# Patient Record
Sex: MALE | Race: WHITE | Employment: FULL TIME | URBAN - METROPOLITAN AREA
[De-identification: names, ages, dates, MRNs, and addresses within clinical notes are randomized per-mention and may not be internally consistent; named-entity substitution may affect disease eponyms.]

---

## 2017-03-17 ENCOUNTER — ALLSCRIPTS OFFICE VISIT (OUTPATIENT)
Dept: OTHER | Facility: OTHER | Age: 57
End: 2017-03-17

## 2017-09-06 ENCOUNTER — ALLSCRIPTS OFFICE VISIT (OUTPATIENT)
Dept: OTHER | Facility: OTHER | Age: 57
End: 2017-09-06

## 2017-10-17 ENCOUNTER — GENERIC CONVERSION - ENCOUNTER (OUTPATIENT)
Dept: OTHER | Facility: OTHER | Age: 57
End: 2017-10-17

## 2017-11-08 ENCOUNTER — ALLSCRIPTS OFFICE VISIT (OUTPATIENT)
Dept: OTHER | Facility: OTHER | Age: 57
End: 2017-11-08

## 2017-11-10 NOTE — PROGRESS NOTES
Assessment    1  Acute sinusitis (461 9) (J01 90)    Plan  Acute sinusitis    · LevoFLOXacin 500 MG Oral Tablet (Levaquin); Take 1 tablet daily   · Guaifenesin-Codeine 100-10 MG/5ML Oral Solution; TAKE 5 ML DAILY ATBEDTIME   · Follow Up if Not Better Evaluation and Treatment  Follow-up  Status: Complete  Done:08Nov2017 02:10PM   · Drink at least 6 glasses of water or juice a day ; Status:Complete;   Done: [id number]:10PM   · Taking a hot steamy shower may help your condition ; Status:Complete;   Done:08Nov2017 02:10PM    Discussion/Summary    To consider probiotic  Possible side effects of new medications were reviewed with the patient/guardian today  The treatment plan was reviewed with the patient/guardian  The patient/guardian understands and agrees with the treatment plan      Chief Complaint  Patient c/o his nose, ears throat and chest pains  Patient is extremely tried  History of Present Illness  Patient was sore throat and ear pain which is ongoing since last visit patient with fatigue  Patient also with some chest pain associated with this  Patient with intermittent cough and some sputum production  Patient with diarrhea but no vomiting  Review of Systems   Constitutional: No fever or chills, feels well, no tiredness, no recent weight gain or weight loss  Eyes: No complaints of eye pain, no red eyes, no discharge from eyes, no itchy eyes  ENT: as noted in HPI  Cardiovascular: No complaints of slow heart rate, no fast heart rate, no chest pain, no palpitations, no leg claudication, no lower extremity  Respiratory: as noted in HPI  Gastrointestinal: No complaints of abdominal pain, no constipation, no nausea or vomiting, no diarrhea or bloody stools  Genitourinary: No complaints of dysuria, no incontinence, no hesitancy, no nocturia, no genital lesion, no testicular pain  Musculoskeletal: No complaints of arthralgia, no myalgias, no joint swelling or stiffness, no limb pain or swelling  Integumentary: No complaints of skin rash or skin lesions, no itching, no skin wound, no dry skin  Neurological: No compliants of headache, no confusion, no convulsions, no numbness or tingling, no dizziness or fainting, no limb weakness, no difficulty walking  Psychiatric: Is not suicidal, no sleep disturbances, no anxiety or depression, no change in personality, no emotional problems  Endocrine: No complaints of proptosis, no hot flashes, no muscle weakness, no erectile dysfunction, no deepening of the voice, no feelings of weakness  Hematologic/Lymphatic: No complaints of swollen glands, no swollen glands in the neck, does not bleed easily, no easy bruising  Active Problems  1  Acute sinusitis (461 9) (J01 90)   2  Bilateral acute serous otitis media, recurrence not specified (381 01) (H65 03)   3  Circulation problem (459 9) (I99 9)   4  Community acquired pneumonia (5) (J18 9)   5  Erectile dysfunction (607 84) (N52 9)   6  HTN (hypertension) (401 9) (I10)   7  Hyperlipidemia (272 4) (E78 5)   8  Other chronic pain (338 29) (G89 29)   9  Sensation of cold in lower extremity (782 9) (R20 9)   10  Special screening examination for neoplasm of prostate (V76 44) (Z12 5)   11  Tobacco abuse (305 1) (Z72 0)   12  Type 2 diabetes mellitus (250 00) (E11 9)   13  URI, acute (465 9) (J06 9)    Past Medical History  1  Acute maxillary sinusitis (461 0) (J01 00)    The active problems and past medical history were reviewed and updated today  Surgical History  1  History of Colonoscopy (Fiberoptic)    Family History  Father    1  Family history of Liver Cancer   2  Family history of Malignant Melanoma Of The Skin (V16 8)    Social History     · Alcohol Use (History)   · Former smoker (N70 96) (S09 815)    Current Meds   1  Aspirin 325 MG Oral Tablet; Therapy: 96ZSB8206 to Recorded   2  Fluticasone Propionate 50 MCG/ACT Nasal Suspension; USE 1 SPRAY IN EACH NOSTRIL TWICE DAILY;  Therapy: 51AGF8149 to (Last Rx: 01EAL1854)  Requested for: 02EDF0241 Ordered   3  Glimepiride 2 MG Oral Tablet; TAKE 1 TABLET DAILY AS DIRECTED; Therapy: 59ELR7441 to (Evaluate:37Iqt0211)  Requested for: 52LFR5775; Last Rx:17Mar2017 Ordered   4  Guaifenesin-Codeine 100-10 MG/5ML Oral Solution; TAKE 5 ML DAILY AT BEDTIME; Therapy: 38DPK1279 to (Evaluate:71Imi9375); Last Rx:17Oct2017 Ordered   5  Losartan Potassium 25 MG Oral Tablet; TAKE 1 TABLET DAILY; Therapy: 96AHO8956 to (Evaluate:24Hlk2676)  Requested for: 49UMP5767; Last Rx:17Mar2017 Ordered   6  MetFORMIN HCl - 1000 MG Oral Tablet; TAKE 1 TABLET TWICE DAILY; Therapy: 00Zfq1790 to (Evaluate:27Nov2017)  Requested for: 38Vuu5691; Last Rx:20Urq3856 Ordered   7  Niacin ER (Antihyperlipidemic) 1000 MG Oral Tablet Extended Release; TAKE 1 TABLET AT BEDTIME; Therapy: 25ZKW6982 to (Evaluate:73Ayj8650)  Requested for: 55SZU5749; Last Rx:17Mar2017 Ordered   8  ProAir  (90 Base) MCG/ACT Inhalation Aerosol Solution; INHALE 2 PUFFS EVERY 4 HOURS AS NEEDED; Therapy: 37SXE1464 to (Evaluate:15Jun2017)  Requested for: 68ZAD0405; Last Rx:17Mar2017 Ordered   9  Rosuvastatin Calcium 10 MG Oral Tablet; TAKE 1 TABLET AT BEDTIME; Therapy: 07NPU9894 to (Evaluate:46Syv2677)  Requested for: 79AYY9081; Last Rx:17Mar2017 Ordered   10  Zetia 10 MG Oral Tablet; Take 1 tablet daily; Therapy: 25NLB7573 to (Evaluate:16May2017)  Requested for: 05ZFI3253; Last  Rx:17Mar2017 Ordered    The medication list was reviewed and updated today  Allergies  1  Penicillins  2  Shellfish    Vitals  Vital Signs    Recorded: 67ECZ1809 01:04PM   Temperature 96 9 F, Tympanic   Systolic 660, LLE, Sitting   Diastolic 78, LLE, Sitting   Height 6 ft    Weight 228 lb 4 oz   BMI Calculated 30 96   BSA Calculated 2 25       Physical Exam   Constitutional  General appearance: No acute distress, well appearing and well nourished  Eyes  Conjunctiva and lids: No swelling, erythema, or discharge     Pupils and irises: Equal, round and reactive to light  Ears, Nose, Mouth, and Throat  External inspection of ears and nose: Normal    Otoscopic examination: Tympanic membrance translucent with normal light reflex  Canals patent without erythema  Nasal mucosa, septum, and turbinates: Normal without edema or erythema  Oropharynx: Abnormal  -- red, postnasal drip  Pulmonary  Respiratory effort: No increased work of breathing or signs of respiratory distress  Auscultation of lungs: Clear to auscultation, equal breath sounds bilaterally, no wheezes, no rales, no rhonci  Cardiovascular  Palpation of heart: Normal PMI, no thrills  Auscultation of heart: Normal rate and rhythm, normal S1 and S2, without murmurs  Examination of extremities for edema and/or varicosities: Normal    Carotid pulses: Normal    Abdomen  Abdomen: Non-tender, no masses  Liver and spleen: No hepatomegaly or splenomegaly  Lymphatic  Palpation of lymph nodes in neck: No lymphadenopathy  Musculoskeletal  Gait and station: Normal    Digits and nails: Normal without clubbing or cyanosis  Inspection/palpation of joints, bones, and muscles: Normal    Skin  Skin and subcutaneous tissue: Normal without rashes or lesions  Neurologic  Cranial nerves: Cranial nerves 2-12 intact  Reflexes: 2+ and symmetric  Sensation: No sensory loss  Psychiatric  Orientation to person, place and time: Normal    Mood and affect: Normal          Results/Data  PHQ-9 Adult Depression Screening [id number] 01:04PM User, [facility]     Test Name Result Flag Reference   PHQ-9 Adult Depression Score 14       Over the last two weeks, how often have you been bothered by any of the following problems?  Little interest or pleasure in doing things: More than half the days - 2 Feeling down, depressed, or hopeless: More than half the days - 2 Trouble falling or staying asleep, or sleeping too much: Nearly every day - 3 Feeling tired or having little energy: Nearly every day - 3 Poor appetite or over eating: Not at all - 0 Feeling bad about yourself - or that you are a failure or have let yourself or your family down: More than half the days - 2 Trouble concentrating on things, such as reading the newspaper or watching television: Not at all - 0 Moving or speaking so slowly that other people could have noticed   Or the opposite -  being so fidgety or restless that you have been moving around a lot more than usual: More than half the days - 2 Thoughts that you would be better off dead, or of hurting yourself in some way: Not at all - 0   PHQ-9 Adult Depression Screening Positive     PHQ-9 Difficulty Level Somewhat difficult     PHQ-9 Severity Moderate Depression           Signatures   Electronically signed by : Yaw Haque DO; Nov 8 2017  2:11PM EST                       (Author)

## 2018-01-12 VITALS
BODY MASS INDEX: 30.91 KG/M2 | SYSTOLIC BLOOD PRESSURE: 120 MMHG | WEIGHT: 228.25 LBS | HEIGHT: 72 IN | DIASTOLIC BLOOD PRESSURE: 78 MMHG | TEMPERATURE: 96.9 F

## 2018-01-12 VITALS
HEIGHT: 73 IN | SYSTOLIC BLOOD PRESSURE: 124 MMHG | BODY MASS INDEX: 26.17 KG/M2 | DIASTOLIC BLOOD PRESSURE: 68 MMHG | WEIGHT: 197.5 LBS

## 2018-01-12 VITALS
WEIGHT: 217 LBS | DIASTOLIC BLOOD PRESSURE: 68 MMHG | HEIGHT: 72 IN | SYSTOLIC BLOOD PRESSURE: 124 MMHG | BODY MASS INDEX: 29.39 KG/M2

## 2018-01-15 NOTE — PROGRESS NOTES
Assessment    1  Erectile dysfunction (607 84) (N52 9)    Plan  Community acquired pneumonia    · ProAir  (90 Base) MCG/ACT Inhalation Aerosol Solution; INHALE 2  PUFFS EVERY 4 HOURS AS NEEDED  Erectile dysfunction    · Cialis 20 MG Oral Tablet; take 1 tablet by mouth once daily as directed   · Emily Dominguez MD, [facility]  (Ophthalmology) Physician Referral  Consult  Status: Hold For -  Scheduling  Requested for: 16QYK1919  Care Summary provided  : Yes  Hyperlipidemia    · Crestor 10 MG Oral Tablet; TAKE 1 TABLET AT BEDTIME   · Niacin ER (Antihyperlipidemic) 1000 MG Oral Tablet Extended Release  (Niaspan); TAKE 1 TABLET AT BEDTIME   · Zetia 10 MG Oral Tablet; Take 1 tablet daily  Type 2 diabetes mellitus    · Glimepiride 2 MG Oral Tablet (Amaryl); Take 1 tablet twice daily   · Losartan Potassium 25 MG Oral Tablet; TAKE 1 TABLET DAILY   · MetFORMIN HCl - 1000 MG Oral Tablet; Take 1 tablet twice a day    Discussion/Summary    Rto in 3 months  Chief Complaint  Yearly physical, needs refills of all meds  History of Present Illness  HPI: Here foe dm,hbp,high lipiids  Active Problems    1  Community acquired pneumonia (5) (J18 9)   2  Hyperlipidemia (272 4) (E78 5)   3  Other chronic pain (338 29) (G89 29)   4  Special screening examination for neoplasm of prostate (V76 44) (Z12 5)   5  Type 2 diabetes mellitus (250 00) (E11 9)   6  URI, acute (465 9) (J06 9)    Surgical History    · History of Colonoscopy (Fiberoptic)    Family History    · Family history of Liver Cancer   · Family history of Malignant Melanoma Of The Skin (V16 8)    Social History    · Alcohol Use (History)   · Former smoker (V15 82) (G70 306)    Current Meds   1  Aspirin 325 MG Oral Tablet; Therapy: 22DXS1105 to Recorded   2  Crestor 10 MG Oral Tablet; TAKE 1 TABLET AT BEDTIME; Therapy: 24Apr2012 to (Evaluate:12Jan2016)  Requested for: 61AOV7606; Last   Rx:13Nov2015 Ordered   3  Glimepiride 2 MG Oral Tablet;  Take 1 tablet twice daily; Therapy: 91MXY3986 to (Last Rx:10Jun2015)  Requested for: 10Jun2015 Ordered   4  Losartan Potassium 25 MG Oral Tablet; TAKE 1 TABLET DAILY; Therapy: 38FCN3804 to (Darius Garcia)  Requested for: 01Sep2015; Last   Rx:01Sep2015 Ordered   5  MetFORMIN HCl - 1000 MG Oral Tablet; Take 1 tablet twice a day; Therapy: 69Njh8447 to (Evaluate:78Scq9089)  Requested for: 22JDI8644; Last   Rx:10Jun2015; Status: ACTIVE - Renewal Denied Ordered   6  Niacin ER (Antihyperlipidemic) 1000 MG Oral Tablet Extended Release; TAKE 1 TABLET   AT BEDTIME; Therapy: 45Znj0271 to (Evaluate:76Mel3485)  Requested for: 83HMM9001; Last   Rx:10Jun2015 Ordered   7  ProAir  (90 Base) MCG/ACT Inhalation Aerosol Solution; INHALE 2 PUFFS   EVERY 4 HOURS AS NEEDED; Therapy: 79IBS8818 to (Evaluate:05Hzv0252)  Requested for: 98QVT0849; Last   Rx:13Nov2015 Ordered   8  Zetia 10 MG Oral Tablet; Take 1 tablet daily; Therapy: 86UZE8583 to (Evaluate:32Cwg8042)  Requested for: 45MJM2352; Last   Rx:27Oct2015 Ordered    Allergies    1  Penicillins    2  Shellfish    Vitals   Recorded: 14UUH8453 94:24DF   Systolic 844, RUE, Sitting   Diastolic 82, RUE, Sitting   Height 5 ft 10 in   Weight 231 lb 6 oz   BMI Calculated 33 2   BSA Calculated 2 22     Physical Exam    Constitutional   General appearance: No acute distress, well appearing and well nourished  Eyes   Conjunctiva and lids: No erythema, swelling or discharge  Pupils and irises: Equal, round, reactive to light  Ophthalmoscopic examination: Normal fundi and optic discs  Ears, Nose, Mouth, and Throat   External inspection of ears and nose: Normal     Otoscopic examination: Tympanic membranes translucent with normal light reflex  Canals patent without erythema  Hearing: Normal     Nasal mucosa, septum, and turbinates: Normal without edema or erythema  Lips, teeth, and gums: Normal, good dentition  Oropharynx: Normal with no erythema, edema, exudate or lesions  Neck   Neck: Supple, symmetric, trachea midline, no masses  Thyroid: Normal, no thyromegaly  Pulmonary   Respiratory effort: No increased work of breathing or signs of respiratory distress  Percussion of chest: Normal     Palpation of chest: Normal     Auscultation of lungs: Clear to auscultation  Cardiovascular   Palpation of heart: Normal PMI, no thrills  Auscultation of heart: Normal rate and rhythm, normal S1 and S2, no murmurs  Carotid pulses: 2+ bilaterally  Abdominal aorta: Normal     Femoral pulses: 2+ bilaterally  Pedal pulses: 2+ bilaterally  Examination of extremities for edema and/or varicosities: Normal     Chest   Breasts: Normal, no dimpling or skin changes appreciated  Palpation of breasts and axillae: Normal, no masses palpated  Chest: Normal     Abdomen   Abdomen: Non-tender, no masses  Liver and spleen: No hepatomegaly or splenomegaly  Examination for hernias: No hernias appreciated  Anus, perineum, and rectum: Normal sphincter tone, no masses, no prolapse  Stool sample for occult blood: Negative  Genitourinary   Scrotal contents: Normal testes, no masses  Penis: Normal, no lesions  Digital rectal exam of prostate: Normal size, no masses  Lymphatic   Palpation of lymph nodes in neck: No lymphadenopathy  Palpation of lymph nodes in axillae: No lymphadenopathy  Palpation of lymph nodes in groin: No lymphadenopathy  Palpation of lymph nodes in other areas: No lymphadenopathy  Musculoskeletal   Gait and station: Normal     Inspection/palpation of digits and nails: Normal without clubbing or cyanosis  Inspection/palpation of joints, bones, and muscles: Normal     Range of motion: Normal     Stability: Normal     Muscle strength/tone: Normal     Skin   Skin and subcutaneous tissue: Normal without rashes or lesions  Palpation of skin and subcutaneous tissue: Normal turgor      Neurologic   Cranial nerves: Cranial nerves 2-12 intact  Reflexes: 2+ and symmetric  Sensation: No sensory loss  Psychiatric   Judgment and insight: Normal     Orientation to person, place and time: Normal     Recent and remote memory: Intact      Mood and affect: Normal        Signatures   Electronically signed by : Marian Ghosh DO; Mar  3 2016  1:35PM EST                       (Author)

## 2018-01-22 VITALS
HEIGHT: 72 IN | DIASTOLIC BLOOD PRESSURE: 60 MMHG | SYSTOLIC BLOOD PRESSURE: 122 MMHG | TEMPERATURE: 97.7 F | BODY MASS INDEX: 30.39 KG/M2 | WEIGHT: 224.38 LBS

## 2018-02-07 DIAGNOSIS — N52.9 ERECTILE DYSFUNCTION, UNSPECIFIED ERECTILE DYSFUNCTION TYPE: Primary | ICD-10-CM

## 2018-02-08 RX ORDER — SILDENAFIL 100 MG/1
100 TABLET, FILM COATED ORAL DAILY PRN
Qty: 10 TABLET | Refills: 2 | OUTPATIENT
Start: 2018-02-08 | End: 2018-04-25

## 2018-02-23 ENCOUNTER — TELEPHONE (OUTPATIENT)
Dept: FAMILY MEDICINE CLINIC | Facility: CLINIC | Age: 58
End: 2018-02-23

## 2018-04-03 RX ORDER — LOSARTAN POTASSIUM 25 MG/1
1 TABLET ORAL DAILY
COMMUNITY
Start: 2014-06-27 | End: 2018-10-15 | Stop reason: SDUPTHER

## 2018-04-03 RX ORDER — FLUTICASONE PROPIONATE 50 MCG
1 SPRAY, SUSPENSION (ML) NASAL 2 TIMES DAILY
COMMUNITY
Start: 2017-10-17 | End: 2018-11-30

## 2018-04-03 RX ORDER — NIACIN 1000 MG/1
1 TABLET, EXTENDED RELEASE ORAL
COMMUNITY
Start: 2012-04-24 | End: 2018-04-04 | Stop reason: SDUPTHER

## 2018-04-03 RX ORDER — ALBUTEROL SULFATE 90 UG/1
2 AEROSOL, METERED RESPIRATORY (INHALATION) EVERY 4 HOURS PRN
COMMUNITY
Start: 2015-11-13 | End: 2018-10-15 | Stop reason: SDUPTHER

## 2018-04-03 RX ORDER — GLIMEPIRIDE 2 MG/1
1 TABLET ORAL DAILY
COMMUNITY
Start: 2013-01-09 | End: 2018-10-15 | Stop reason: SDUPTHER

## 2018-04-03 RX ORDER — EZETIMIBE 10 MG/1
1 TABLET ORAL DAILY
COMMUNITY
Start: 2013-09-05 | End: 2018-04-04 | Stop reason: SDUPTHER

## 2018-04-03 RX ORDER — ROSUVASTATIN CALCIUM 10 MG/1
1 TABLET, COATED ORAL
COMMUNITY
Start: 2012-04-24 | End: 2018-04-04 | Stop reason: SDUPTHER

## 2018-04-03 RX ORDER — ASPIRIN 325 MG
TABLET ORAL
COMMUNITY
Start: 2013-01-02 | End: 2019-05-14 | Stop reason: SDUPTHER

## 2018-04-04 ENCOUNTER — OFFICE VISIT (OUTPATIENT)
Dept: FAMILY MEDICINE CLINIC | Facility: CLINIC | Age: 58
End: 2018-04-04
Payer: COMMERCIAL

## 2018-04-04 VITALS
OXYGEN SATURATION: 98 % | BODY MASS INDEX: 30.88 KG/M2 | WEIGHT: 228 LBS | DIASTOLIC BLOOD PRESSURE: 68 MMHG | HEART RATE: 97 BPM | SYSTOLIC BLOOD PRESSURE: 118 MMHG | TEMPERATURE: 97.7 F | HEIGHT: 72 IN

## 2018-04-04 DIAGNOSIS — J40 BRONCHITIS: Primary | ICD-10-CM

## 2018-04-04 DIAGNOSIS — E11.9 TYPE 2 DIABETES MELLITUS WITHOUT COMPLICATION, UNSPECIFIED LONG TERM INSULIN USE STATUS: Primary | ICD-10-CM

## 2018-04-04 DIAGNOSIS — J40 BRONCHITIS: ICD-10-CM

## 2018-04-04 PROBLEM — I10 HTN (HYPERTENSION): Status: ACTIVE | Noted: 2017-03-17

## 2018-04-04 PROCEDURE — 99213 OFFICE O/P EST LOW 20 MIN: CPT | Performed by: FAMILY MEDICINE

## 2018-04-04 RX ORDER — GUAIFENESIN AND CODEINE PHOSPHATE 100; 10 MG/5ML; MG/5ML
5 SOLUTION ORAL 3 TIMES DAILY PRN
Qty: 120 ML | Refills: 0 | Status: SHIPPED | OUTPATIENT
Start: 2018-04-04 | End: 2018-11-30

## 2018-04-04 RX ORDER — NIACIN 1000 MG/1
1000 TABLET, EXTENDED RELEASE ORAL
Qty: 90 TABLET | Refills: 0 | Status: SHIPPED | OUTPATIENT
Start: 2018-04-04 | End: 2018-06-30 | Stop reason: SDUPTHER

## 2018-04-04 RX ORDER — ROSUVASTATIN CALCIUM 10 MG/1
10 TABLET, COATED ORAL DAILY
Qty: 90 TABLET | Refills: 0 | Status: SHIPPED | OUTPATIENT
Start: 2018-04-04 | End: 2018-06-30 | Stop reason: SDUPTHER

## 2018-04-04 RX ORDER — MOXIFLOXACIN HYDROCHLORIDE 400 MG/1
400 TABLET ORAL DAILY
Qty: 7 TABLET | Refills: 0 | Status: SHIPPED | OUTPATIENT
Start: 2018-04-04 | End: 2018-04-11

## 2018-04-04 RX ORDER — EZETIMIBE 10 MG/1
10 TABLET ORAL DAILY
Qty: 90 TABLET | Refills: 0 | Status: SHIPPED | OUTPATIENT
Start: 2018-04-04 | End: 2018-06-30 | Stop reason: SDUPTHER

## 2018-04-04 RX ORDER — MOXIFLOXACIN HYDROCHLORIDE 400 MG/1
400 TABLET ORAL DAILY
Qty: 7 TABLET | Refills: 0 | Status: SHIPPED | OUTPATIENT
Start: 2018-04-04 | End: 2018-04-04 | Stop reason: SDUPTHER

## 2018-04-04 NOTE — TELEPHONE ENCOUNTER
Patient is requesting medication refills of Zetia 10 mg, Niaspan 1000 mg Cr, Crestor 10 mg today at his visit

## 2018-04-04 NOTE — PROGRESS NOTES
Assessment/Plan:   patient will continue with albuterol 2 puffs 3 times a day  No problem-specific Assessment & Plan notes found for this encounter  Diagnoses and all orders for this visit:    Bronchitis  -     moxifloxacin (AVELOX) 400 MG tablet; Take 1 tablet (400 mg total) by mouth daily for 7 days    Other orders  -     aspirin 325 mg tablet; Take by mouth  -     fluticasone (FLONASE) 50 mcg/act nasal spray; 1 spray into each nostril 2 (two) times a day  -     glimepiride (AMARYL) 2 mg tablet; Take 1 tablet by mouth daily  -     losartan (COZAAR) 25 mg tablet; Take 1 tablet by mouth daily  -     metFORMIN (GLUCOPHAGE) 1000 MG tablet; Take 1 tablet by mouth 2 (two) times a day  -     niacin (NIASPAN) 1000 MG CR tablet; Take 1 tablet by mouth  -     albuterol (PROAIR HFA) 90 mcg/act inhaler; Inhale 2 puffs every 4 (four) hours as needed  -     rosuvastatin (CRESTOR) 10 MG tablet; Take 1 tablet by mouth  -     ezetimibe (ZETIA) 10 mg tablet; Take 1 tablet by mouth daily          Subjective:      Patient ID: Joya Mchugh is a 60 y o  male  Patient is here with sore throat, ear ache, fatigue over the past 6 days  Patient with cough  Occasional postnasal drip / rhinorrhea  Bilateral ear pain noted  Patient has noticed some your discharge also  Possible fever at the beginning of the illness  No vomiting  No diarrhea  Patient using aspirin  The following portions of the patient's history were reviewed and updated as appropriate: allergies, current medications, past family history, past medical history, past social history, past surgical history and problem list     Review of Systems   Constitutional: Positive for fever  Negative for fatigue  HENT: Positive for ear pain, rhinorrhea and sore throat  Eyes: Negative  Respiratory: Positive for cough  Cardiovascular: Negative  Gastrointestinal: Negative  Negative for diarrhea and nausea  Endocrine: Negative      Genitourinary: Negative  Musculoskeletal: Negative  Skin: Negative  Allergic/Immunologic: Negative  Neurological: Negative  Hematological: Negative  Psychiatric/Behavioral: Negative  Objective:      /68 (BP Location: Right arm, Patient Position: Sitting, Cuff Size: Large)   Pulse 97   Temp 97 7 °F (36 5 °C) (Tympanic)   Ht 6' (1 829 m)   Wt 103 kg (228 lb)   SpO2 98%   BMI 30 92 kg/m²          Physical Exam   Constitutional: He is oriented to person, place, and time  He appears well-developed and well-nourished  No distress  HENT:   Head: Normocephalic  Right Ear: External ear normal    Left Ear: External ear normal    Mouth/Throat: Oropharyngeal exudate present  Eyes: EOM are normal  Pupils are equal, round, and reactive to light  Right eye exhibits no discharge  Left eye exhibits no discharge  No scleral icterus  Neck: Normal range of motion  Neck supple  No thyromegaly present  Cardiovascular: Normal rate, regular rhythm, normal heart sounds and intact distal pulses  Exam reveals no gallop and no friction rub  No murmur heard  Pulmonary/Chest: Effort normal and breath sounds normal  No respiratory distress  He has no wheezes  He has no rales  He exhibits no tenderness  Rhonchi right base   Abdominal: Soft  Bowel sounds are normal  He exhibits no distension  There is no tenderness  There is no rebound and no guarding  Musculoskeletal: Normal range of motion  He exhibits no edema or tenderness  Lymphadenopathy:     He has no cervical adenopathy  Neurological: He is oriented to person, place, and time  No cranial nerve deficit  He exhibits normal muscle tone  Coordination normal    Skin: Skin is warm and dry  No rash noted  He is not diaphoretic  No erythema  No pallor  Psychiatric: He has a normal mood and affect  His behavior is normal  Judgment and thought content normal    Nursing note and vitals reviewed

## 2018-04-04 NOTE — TELEPHONE ENCOUNTER
Dr Fabiana Rodriugez,    Pharmacy also said the the patient is requesting a refill for moxifloxacin 400 mg

## 2018-04-25 ENCOUNTER — OFFICE VISIT (OUTPATIENT)
Dept: FAMILY MEDICINE CLINIC | Facility: CLINIC | Age: 58
End: 2018-04-25

## 2018-04-25 VITALS
WEIGHT: 222.6 LBS | HEIGHT: 73 IN | DIASTOLIC BLOOD PRESSURE: 70 MMHG | SYSTOLIC BLOOD PRESSURE: 120 MMHG | BODY MASS INDEX: 29.5 KG/M2 | TEMPERATURE: 98 F

## 2018-04-25 DIAGNOSIS — J40 BRONCHITIS: Primary | ICD-10-CM

## 2018-04-25 DIAGNOSIS — Z72.0 TOBACCO ABUSE: ICD-10-CM

## 2018-04-25 DIAGNOSIS — J01.10 ACUTE NON-RECURRENT FRONTAL SINUSITIS: ICD-10-CM

## 2018-04-25 PROCEDURE — 99213 OFFICE O/P EST LOW 20 MIN: CPT | Performed by: FAMILY MEDICINE

## 2018-04-25 RX ORDER — CEFDINIR 300 MG/1
300 CAPSULE ORAL EVERY 12 HOURS SCHEDULED
Qty: 20 CAPSULE | Refills: 0 | Status: SHIPPED | OUTPATIENT
Start: 2018-04-25 | End: 2018-05-05

## 2018-04-25 RX ORDER — METHYLPREDNISOLONE 4 MG/1
TABLET ORAL
Qty: 21 TABLET | Refills: 0 | Status: SHIPPED | OUTPATIENT
Start: 2018-04-25 | End: 2018-11-30

## 2018-04-25 NOTE — PROGRESS NOTES
Assessment/Plan:   patient has used amoxicillin without difficulty  Patient will use Omnicef twice daily with food for the next 10 days  Patient will do Medrol Dosepak  Patient will go for CT scan of his chest        Diagnoses and all orders for this visit:    Bronchitis  -     XR chest pa & lateral; Future  -     CT lung screening program; Future  -     Methylprednisolone 4 MG TBPK; Use as directed on package    Acute non-recurrent frontal sinusitis  -     cefdinir (OMNICEF) 300 mg capsule; Take 1 capsule (300 mg total) by mouth every 12 (twelve) hours for 10 days    Tobacco abuse          Subjective:      Patient ID: Madhavi Maldonado is a 60 y o  male  Patient is here with sore throat earache cough and headache which is been persisting  Earache    Associated symptoms include coughing, headaches, rhinorrhea and a sore throat  Sore Throat    Associated symptoms include congestion, coughing, ear pain and headaches  Cough   Associated symptoms include ear pain, headaches, rhinorrhea and a sore throat  Headache    Associated symptoms include coughing, ear pain, rhinorrhea and a sore throat  The following portions of the patient's history were reviewed and updated as appropriate: allergies, current medications, past family history, past medical history, past social history, past surgical history and problem list     Review of Systems   Constitutional: Negative  HENT: Positive for congestion, ear pain, rhinorrhea and sore throat  Eyes: Negative  Respiratory: Positive for cough  Cardiovascular: Negative  Gastrointestinal: Negative  Endocrine: Negative  Genitourinary: Negative  Musculoskeletal: Negative  Skin: Negative  Allergic/Immunologic: Negative  Neurological: Positive for headaches  Hematological: Negative  Psychiatric/Behavioral: Negative            Objective:      /70 (BP Location: Left arm, Patient Position: Sitting, Cuff Size: Standard)   Temp 98 °F (36 7 °C)   Ht 6' 1" (1 854 m)   Wt 101 kg (222 lb 9 6 oz)   BMI 29 37 kg/m²          Physical Exam   Constitutional: He is oriented to person, place, and time  He appears well-developed and well-nourished  No distress  HENT:   Head: Normocephalic  Right Ear: External ear normal    Left Ear: External ear normal    Mouth/Throat: Oropharyngeal exudate present  Eyes: EOM are normal  Pupils are equal, round, and reactive to light  Right eye exhibits no discharge  Left eye exhibits no discharge  No scleral icterus  Neck: Normal range of motion  Neck supple  No thyromegaly present  Cardiovascular: Normal rate, regular rhythm, normal heart sounds and intact distal pulses  Exam reveals no gallop and no friction rub  No murmur heard  Pulmonary/Chest: Effort normal and breath sounds normal  No respiratory distress  He has no wheezes  He has no rales  He exhibits no tenderness  Abdominal: Soft  Bowel sounds are normal  He exhibits no distension  There is no tenderness  There is no rebound and no guarding  Musculoskeletal: Normal range of motion  He exhibits no edema or tenderness  Lymphadenopathy:     He has no cervical adenopathy  Neurological: He is oriented to person, place, and time  No cranial nerve deficit  He exhibits normal muscle tone  Coordination normal    Skin: Skin is warm and dry  No rash noted  He is not diaphoretic  No erythema  No pallor  Psychiatric: He has a normal mood and affect  His behavior is normal  Judgment and thought content normal    Nursing note and vitals reviewed

## 2018-05-22 ENCOUNTER — HOSPITAL ENCOUNTER (OUTPATIENT)
Dept: CT IMAGING | Facility: HOSPITAL | Age: 58
Discharge: HOME | End: 2018-05-22
Payer: COMMERCIAL

## 2018-05-22 DIAGNOSIS — J40 BRONCHITIS: ICD-10-CM

## 2018-05-24 ENCOUNTER — TELEPHONE (OUTPATIENT)
Dept: FAMILY MEDICINE CLINIC | Facility: CLINIC | Age: 58
End: 2018-05-24

## 2018-05-24 NOTE — TELEPHONE ENCOUNTER
PT LOOKING FOR RESULTS OF HIS CT SCAN FOR LUNG CANCER SCREENING  DOES NOT LOOK LIKE WE HAVE A FINAL READING   PLEASE CALL WHEN READY

## 2018-06-30 DIAGNOSIS — E11.9 TYPE 2 DIABETES MELLITUS WITHOUT COMPLICATION (HCC): ICD-10-CM

## 2018-07-02 RX ORDER — NIACIN 1000 MG/1
1000 TABLET, EXTENDED RELEASE ORAL
Qty: 90 TABLET | Refills: 0 | Status: SHIPPED | OUTPATIENT
Start: 2018-07-02 | End: 2018-10-15 | Stop reason: SDUPTHER

## 2018-07-02 RX ORDER — ROSUVASTATIN CALCIUM 10 MG/1
10 TABLET, COATED ORAL DAILY
Qty: 90 TABLET | Refills: 0 | Status: SHIPPED | OUTPATIENT
Start: 2018-07-02 | End: 2018-10-15 | Stop reason: SDUPTHER

## 2018-07-02 RX ORDER — EZETIMIBE 10 MG/1
10 TABLET ORAL DAILY
Qty: 90 TABLET | Refills: 0 | Status: SHIPPED | OUTPATIENT
Start: 2018-07-02 | End: 2018-10-15 | Stop reason: SDUPTHER

## 2018-10-15 ENCOUNTER — OFFICE VISIT (OUTPATIENT)
Dept: FAMILY MEDICINE CLINIC | Facility: CLINIC | Age: 58
End: 2018-10-15

## 2018-10-15 VITALS
TEMPERATURE: 98.1 F | DIASTOLIC BLOOD PRESSURE: 92 MMHG | BODY MASS INDEX: 30.62 KG/M2 | WEIGHT: 231 LBS | HEIGHT: 73 IN | SYSTOLIC BLOOD PRESSURE: 142 MMHG

## 2018-10-15 DIAGNOSIS — E78.2 MIXED HYPERLIPIDEMIA: ICD-10-CM

## 2018-10-15 DIAGNOSIS — J40 BRONCHITIS: Primary | ICD-10-CM

## 2018-10-15 DIAGNOSIS — E11.9 TYPE 2 DIABETES MELLITUS WITHOUT COMPLICATION, UNSPECIFIED WHETHER LONG TERM INSULIN USE (HCC): ICD-10-CM

## 2018-10-15 PROCEDURE — 99212 OFFICE O/P EST SF 10 MIN: CPT | Performed by: FAMILY MEDICINE

## 2018-10-15 PROCEDURE — 4010F ACE/ARB THERAPY RXD/TAKEN: CPT | Performed by: FAMILY MEDICINE

## 2018-10-15 RX ORDER — PREDNISONE 20 MG/1
40 TABLET ORAL DAILY
Qty: 10 TABLET | Refills: 0 | Status: SHIPPED | OUTPATIENT
Start: 2018-10-15 | End: 2018-11-30

## 2018-10-15 RX ORDER — CEFDINIR 300 MG/1
300 CAPSULE ORAL EVERY 12 HOURS SCHEDULED
Qty: 20 CAPSULE | Refills: 0 | Status: SHIPPED | OUTPATIENT
Start: 2018-10-15 | End: 2018-10-25

## 2018-10-15 RX ORDER — GUAIFENESIN AND CODEINE PHOSPHATE 100; 10 MG/5ML; MG/5ML
5 SOLUTION ORAL 3 TIMES DAILY PRN
Qty: 120 ML | Refills: 0 | Status: SHIPPED | OUTPATIENT
Start: 2018-10-15 | End: 2018-11-30

## 2018-10-15 RX ORDER — NIACIN 1000 MG/1
1000 TABLET, EXTENDED RELEASE ORAL
Qty: 90 TABLET | Refills: 0 | Status: SHIPPED | OUTPATIENT
Start: 2018-10-15 | End: 2019-01-29 | Stop reason: SDUPTHER

## 2018-10-15 RX ORDER — EZETIMIBE 10 MG/1
10 TABLET ORAL DAILY
Qty: 90 TABLET | Refills: 1 | Status: SHIPPED | OUTPATIENT
Start: 2018-10-15 | End: 2019-05-14 | Stop reason: SDUPTHER

## 2018-10-15 RX ORDER — LOSARTAN POTASSIUM 25 MG/1
25 TABLET ORAL DAILY
Qty: 90 TABLET | Refills: 1 | Status: SHIPPED | OUTPATIENT
Start: 2018-10-15 | End: 2019-05-14 | Stop reason: SDUPTHER

## 2018-10-15 RX ORDER — ALBUTEROL SULFATE 90 UG/1
2 AEROSOL, METERED RESPIRATORY (INHALATION) EVERY 4 HOURS PRN
Qty: 1 EACH | Refills: 0 | Status: SHIPPED | OUTPATIENT
Start: 2018-10-15 | End: 2019-05-14 | Stop reason: SDUPTHER

## 2018-10-15 RX ORDER — GLIMEPIRIDE 2 MG/1
2 TABLET ORAL DAILY
Qty: 90 TABLET | Refills: 1 | Status: SHIPPED | OUTPATIENT
Start: 2018-10-15 | End: 2019-05-14 | Stop reason: SDUPTHER

## 2018-10-15 RX ORDER — ROSUVASTATIN CALCIUM 10 MG/1
10 TABLET, COATED ORAL DAILY
Qty: 90 TABLET | Refills: 0 | Status: SHIPPED | OUTPATIENT
Start: 2018-10-15 | End: 2019-01-29 | Stop reason: SDUPTHER

## 2018-10-15 NOTE — PROGRESS NOTES
Assessment/Plan:    13-year-old male with:  Bronchitis, type 2 diabetes and hyperlipidemia  Discussed supportive care return parameters  Will continue current medications and give Omnicef times 10 days along with steroid burst   Advised patient to call back if symptoms are not improving or if they are worsening  No problem-specific Assessment & Plan notes found for this encounter  Diagnoses and all orders for this visit:    Bronchitis  -     albuterol (PROAIR HFA) 90 mcg/act inhaler; Inhale 2 puffs every 4 (four) hours as needed for shortness of breath  -     glimepiride (AMARYL) 2 mg tablet; Take 1 tablet (2 mg total) by mouth daily  -     losartan (COZAAR) 25 mg tablet; Take 1 tablet (25 mg total) by mouth daily  -     metFORMIN (GLUCOPHAGE) 1000 MG tablet; Take 1 tablet (1,000 mg total) by mouth 2 (two) times a day  -     cefdinir (OMNICEF) 300 mg capsule; Take 1 capsule (300 mg total) by mouth every 12 (twelve) hours for 10 days  -     predniSONE 20 mg tablet; Take 2 tablets (40 mg total) by mouth daily  -     guaifenesin-codeine (GUAIFENESIN AC) 100-10 MG/5ML liquid; Take 5 mL by mouth 3 (three) times a day as needed for cough    Type 2 diabetes mellitus without complication, unspecified whether long term insulin use (HCC)  -     albuterol (PROAIR HFA) 90 mcg/act inhaler; Inhale 2 puffs every 4 (four) hours as needed for shortness of breath  -     ezetimibe (ZETIA) 10 mg tablet; Take 1 tablet (10 mg total) by mouth daily  -     glimepiride (AMARYL) 2 mg tablet; Take 1 tablet (2 mg total) by mouth daily  -     losartan (COZAAR) 25 mg tablet; Take 1 tablet (25 mg total) by mouth daily  -     metFORMIN (GLUCOPHAGE) 1000 MG tablet; Take 1 tablet (1,000 mg total) by mouth 2 (two) times a day  -     niacin (NIASPAN) 1000 MG CR tablet; Take 1 tablet (1,000 mg total) by mouth daily at bedtime  -     rosuvastatin (CRESTOR) 10 MG tablet;  Take 1 tablet (10 mg total) by mouth daily  -     guaifenesin-codeine (GUAIFENESIN AC) 100-10 MG/5ML liquid; Take 5 mL by mouth 3 (three) times a day as needed for cough          Subjective:   Chief Complaint   Patient presents with    Sore Throat     Patient states that he has been experiencing symptoms for a week   Earache    Headache    Cough    Fatigue          Patient ID: Jamilah Morgan is a 60 y o  male  Patient is a 62-year-old male who presents complaining of cough congestion and wheezing and tightness in his chest with some shortness of breath for the past several days  No fevers chills nausea vomiting  Tolerating p o  intake  Patient also has type 2 diabetes and hyperlipidemia and requests refill of his medications as he has been stable on well controlled  Sore Throat    Associated symptoms include congestion, coughing, ear pain, headaches and shortness of breath  Earache    Associated symptoms include coughing, headaches and a sore throat  Headache    Associated symptoms include coughing, ear pain, sinus pressure and a sore throat  Cough   Associated symptoms include ear pain, headaches, a sore throat, shortness of breath and wheezing  Fatigue   Associated symptoms include congestion, coughing, fatigue, headaches and a sore throat  The following portions of the patient's history were reviewed and updated as appropriate: allergies, current medications, past family history, past medical history, past social history, past surgical history and problem list     Review of Systems   Constitutional: Positive for fatigue  HENT: Positive for congestion, ear pain, sinus pressure and sore throat  Eyes: Negative  Respiratory: Positive for cough, shortness of breath and wheezing  Cardiovascular: Negative  Gastrointestinal: Negative  Endocrine: Negative  Genitourinary: Negative  Musculoskeletal: Negative  Allergic/Immunologic: Negative  Neurological: Positive for headaches  Hematological: Negative  Psychiatric/Behavioral: Negative  All other systems reviewed and are negative  Objective:      /92 (BP Location: Right arm, Patient Position: Sitting, Cuff Size: Standard)   Temp 98 1 °F (36 7 °C) (Tympanic)   Ht 6' 1" (1 854 m)   Wt 105 kg (231 lb)   BMI 30 48 kg/m²          Physical Exam   Constitutional: He is oriented to person, place, and time  He appears well-developed and well-nourished  HENT:   Head: Atraumatic  Right Ear: External ear normal    Left Ear: External ear normal    Mucus and edema in the nasal mucosa   Eyes: Pupils are equal, round, and reactive to light  Conjunctivae and EOM are normal    Neck: Normal range of motion  Cardiovascular: Normal rate, regular rhythm and normal heart sounds  Pulmonary/Chest: Effort normal  No respiratory distress  Decreased air movement bilaterally   Abdominal: Soft  Bowel sounds are normal  He exhibits no distension  There is no tenderness  There is no rebound and no guarding  Musculoskeletal: Normal range of motion  Neurological: He is alert and oriented to person, place, and time  No cranial nerve deficit  Skin: Skin is warm and dry  Psychiatric: He has a normal mood and affect   His behavior is normal  Judgment and thought content normal

## 2018-11-30 PROBLEM — J01.00 ACUTE NON-RECURRENT MAXILLARY SINUSITIS: Status: ACTIVE | Noted: 2018-11-30

## 2019-01-08 ENCOUNTER — OFFICE VISIT (OUTPATIENT)
Dept: FAMILY MEDICINE CLINIC | Facility: CLINIC | Age: 59
End: 2019-01-08

## 2019-01-08 VITALS
BODY MASS INDEX: 33.32 KG/M2 | HEIGHT: 71 IN | SYSTOLIC BLOOD PRESSURE: 112 MMHG | DIASTOLIC BLOOD PRESSURE: 72 MMHG | WEIGHT: 238 LBS | TEMPERATURE: 98.1 F

## 2019-01-08 DIAGNOSIS — J40 BRONCHITIS: Primary | ICD-10-CM

## 2019-01-08 PROCEDURE — 99212 OFFICE O/P EST SF 10 MIN: CPT | Performed by: FAMILY MEDICINE

## 2019-01-08 RX ORDER — LEVOFLOXACIN 500 MG/1
500 TABLET, FILM COATED ORAL EVERY 24 HOURS
Qty: 10 TABLET | Refills: 0 | Status: SHIPPED | OUTPATIENT
Start: 2019-01-08 | End: 2019-01-18

## 2019-01-08 NOTE — PROGRESS NOTES
Assessment/Plan:    Recommend supportive care fluids rest follow-up if not a lot better in a week  Diagnoses and all orders for this visit:    Bronchitis  -     levofloxacin (LEVAQUIN) 500 mg tablet; Take 1 tablet (500 mg total) by mouth every 24 hours for 10 days          Subjective:      Patient ID: Lulu Hansen is a 60 y o  male  Patient presents with:  Cold Like Symptoms: Patient presents today stating he has symptoms of congestion, earache, chest discomfort, coughing, and sore throat for over a month  He was seen a month or so ago and started on a cephalosporin which seemed to help a little bit but symptoms returned  The following portions of the patient's history were reviewed and updated as appropriate: allergies, current medications, past family history, past medical history, past social history, past surgical history and problem list     Review of Systems   Constitutional: Positive for activity change, fatigue and fever  HENT: Positive for congestion, sinus pressure and sore throat  Eyes: Negative  Respiratory: Positive for cough  Cardiovascular: Negative  Gastrointestinal: Negative  Endocrine: Negative  Genitourinary: Negative  Musculoskeletal: Negative  Skin: Negative  Allergic/Immunologic: Negative  Neurological: Negative  Hematological: Negative  Psychiatric/Behavioral: Negative  All other systems reviewed and are negative  Objective:      /72 (BP Location: Left arm, Patient Position: Sitting, Cuff Size: Large)   Temp 98 1 °F (36 7 °C)   Ht 5' 10 5" (1 791 m)   Wt 108 kg (238 lb)   BMI 33 67 kg/m²          Physical Exam   Constitutional: He is oriented to person, place, and time  He appears well-developed and well-nourished  HENT:   Head: Normocephalic and atraumatic     Right Ear: External ear normal    Left Ear: External ear normal    Nose: Nose normal    Mouth/Throat: Oropharynx is clear and moist    Eyes: Pupils are equal, round, and reactive to light  Conjunctivae and EOM are normal    Neck: Normal range of motion  Neck supple  Cardiovascular: Normal rate, regular rhythm and normal heart sounds  Pulmonary/Chest: Effort normal  He has wheezes  He has rales  Abdominal: Soft  Bowel sounds are normal    Musculoskeletal: Normal range of motion  Neurological: He is alert and oriented to person, place, and time  He has normal reflexes  Skin: Skin is warm and dry  Psychiatric: He has a normal mood and affect  His behavior is normal    Nursing note and vitals reviewed

## 2019-01-29 ENCOUNTER — OFFICE VISIT (OUTPATIENT)
Dept: FAMILY MEDICINE CLINIC | Facility: CLINIC | Age: 59
End: 2019-01-29

## 2019-01-29 VITALS
HEART RATE: 95 BPM | WEIGHT: 239.4 LBS | BODY MASS INDEX: 33.52 KG/M2 | HEIGHT: 71 IN | SYSTOLIC BLOOD PRESSURE: 146 MMHG | DIASTOLIC BLOOD PRESSURE: 72 MMHG | TEMPERATURE: 97.9 F

## 2019-01-29 DIAGNOSIS — J02.0 PHARYNGITIS DUE TO STREPTOCOCCUS SPECIES: Primary | ICD-10-CM

## 2019-01-29 DIAGNOSIS — E11.9 TYPE 2 DIABETES MELLITUS WITHOUT COMPLICATION, UNSPECIFIED WHETHER LONG TERM INSULIN USE (HCC): ICD-10-CM

## 2019-01-29 DIAGNOSIS — J40 BRONCHITIS: ICD-10-CM

## 2019-01-29 PROCEDURE — 99213 OFFICE O/P EST LOW 20 MIN: CPT | Performed by: FAMILY MEDICINE

## 2019-01-29 RX ORDER — CEFDINIR 300 MG/1
300 CAPSULE ORAL EVERY 12 HOURS SCHEDULED
Qty: 20 CAPSULE | Refills: 0 | Status: SHIPPED | OUTPATIENT
Start: 2019-01-29 | End: 2019-02-08

## 2019-01-29 RX ORDER — GUAIFENESIN AND CODEINE PHOSPHATE 100; 10 MG/5ML; MG/5ML
5 SOLUTION ORAL 3 TIMES DAILY PRN
Qty: 120 ML | Refills: 1 | Status: SHIPPED | OUTPATIENT
Start: 2019-01-29 | End: 2019-01-29 | Stop reason: SDUPTHER

## 2019-01-29 RX ORDER — ROSUVASTATIN CALCIUM 10 MG/1
10 TABLET, COATED ORAL DAILY
Qty: 90 TABLET | Refills: 1 | Status: SHIPPED | OUTPATIENT
Start: 2019-01-29 | End: 2019-05-14 | Stop reason: SDUPTHER

## 2019-01-29 RX ORDER — NIACIN 1000 MG/1
1000 TABLET, EXTENDED RELEASE ORAL
Qty: 90 TABLET | Refills: 1 | Status: SHIPPED | OUTPATIENT
Start: 2019-01-29 | End: 2019-05-14 | Stop reason: SDUPTHER

## 2019-01-29 RX ORDER — GUAIFENESIN AND CODEINE PHOSPHATE 100; 10 MG/5ML; MG/5ML
5 SOLUTION ORAL 3 TIMES DAILY PRN
Qty: 120 ML | Refills: 1 | Status: SHIPPED | OUTPATIENT
Start: 2019-01-29 | End: 2019-05-14 | Stop reason: SDUPTHER

## 2019-01-29 NOTE — PROGRESS NOTES
Assessment/Plan:     Diagnoses and all orders for this visit:    Pharyngitis due to Streptococcus species  -     cefdinir (OMNICEF) 300 mg capsule; Take 1 capsule (300 mg total) by mouth every 12 (twelve) hours for 10 days    Type 2 diabetes mellitus without complication, unspecified whether long term insulin use (HCC)  -     niacin (NIASPAN) 1000 MG CR tablet; Take 1 tablet (1,000 mg total) by mouth daily at bedtime  -     rosuvastatin (CRESTOR) 10 MG tablet; Take 1 tablet (10 mg total) by mouth daily    Bronchitis  -     Discontinue: guaifenesin-codeine (GUAIFENESIN AC) 100-10 MG/5ML liquid; Take 5 mL by mouth 3 (three) times a day as needed for cough  -     guaifenesin-codeine (GUAIFENESIN AC) 100-10 MG/5ML liquid; Take 5 mL by mouth 3 (three) times a day as needed for cough          Subjective:      Patient ID: Dawn You is a 60 y o  male  Patient is here with sore throat which started last night patient with ongoing cough and sputum production  Patient also with some ear pain bilaterally  No fevers or chills or nausea or vomiting  Patient does have headache associated with this  No medications being used for this  The following portions of the patient's history were reviewed and updated as appropriate: allergies, current medications, past family history, past medical history, past social history, past surgical history and problem list     Review of Systems   Constitutional: Negative  Negative for fever  HENT: Positive for congestion, postnasal drip and sore throat  Eyes: Negative  Respiratory: Positive for cough  Cardiovascular: Negative  Gastrointestinal: Negative  Endocrine: Negative  Genitourinary: Negative  Musculoskeletal: Negative  Skin: Negative  Allergic/Immunologic: Negative  Neurological: Positive for headaches  Hematological: Negative  Psychiatric/Behavioral: Negative            Objective:      /72 (BP Location: Right arm, Patient Position: Sitting, Cuff Size: Large)   Pulse 95   Temp 97 9 °F (36 6 °C) (Tympanic)   Ht 5' 10 75" (1 797 m)   Wt 109 kg (239 lb 6 4 oz)   BMI 33 63 kg/m²          Physical Exam   Constitutional: He is oriented to person, place, and time  He appears well-developed and well-nourished  No distress  HENT:   Head: Normocephalic  Right Ear: External ear normal    Left Ear: External ear normal    Mouth/Throat: Oropharyngeal exudate present  Eyes: Pupils are equal, round, and reactive to light  EOM are normal  Right eye exhibits no discharge  Left eye exhibits no discharge  No scleral icterus  Neck: Normal range of motion  Neck supple  No thyromegaly present  Cardiovascular: Normal rate, regular rhythm, normal heart sounds and intact distal pulses  Exam reveals no gallop and no friction rub  No murmur heard  Pulmonary/Chest: Effort normal and breath sounds normal  No respiratory distress  He has no wheezes  He has no rales  He exhibits no tenderness  Musculoskeletal: Normal range of motion  He exhibits no edema or tenderness  Lymphadenopathy:     He has no cervical adenopathy  Neurological: He is oriented to person, place, and time  No cranial nerve deficit  He exhibits normal muscle tone  Coordination normal    Skin: Skin is warm and dry  No rash noted  He is not diaphoretic  No erythema  No pallor  Psychiatric: He has a normal mood and affect  His behavior is normal  Judgment and thought content normal    Nursing note and vitals reviewed

## 2019-05-14 ENCOUNTER — OFFICE VISIT (OUTPATIENT)
Dept: FAMILY MEDICINE CLINIC | Facility: CLINIC | Age: 59
End: 2019-05-14
Payer: COMMERCIAL

## 2019-05-14 VITALS
DIASTOLIC BLOOD PRESSURE: 86 MMHG | SYSTOLIC BLOOD PRESSURE: 138 MMHG | WEIGHT: 228 LBS | BODY MASS INDEX: 32.64 KG/M2 | HEIGHT: 70 IN | TEMPERATURE: 94.2 F

## 2019-05-14 DIAGNOSIS — E11.9 TYPE 2 DIABETES MELLITUS WITHOUT COMPLICATION, UNSPECIFIED WHETHER LONG TERM INSULIN USE (HCC): ICD-10-CM

## 2019-05-14 DIAGNOSIS — I10 ESSENTIAL HYPERTENSION: ICD-10-CM

## 2019-05-14 DIAGNOSIS — N52.1 ERECTILE DISORDER DUE TO MEDICAL CONDITION IN MALE: ICD-10-CM

## 2019-05-14 DIAGNOSIS — R25.1 TREMOR OF BOTH HANDS: ICD-10-CM

## 2019-05-14 DIAGNOSIS — J40 BRONCHITIS: ICD-10-CM

## 2019-05-14 DIAGNOSIS — J01.00 ACUTE NON-RECURRENT MAXILLARY SINUSITIS: ICD-10-CM

## 2019-05-14 DIAGNOSIS — E78.2 MIXED HYPERLIPIDEMIA: Primary | ICD-10-CM

## 2019-05-14 PROCEDURE — 3075F SYST BP GE 130 - 139MM HG: CPT | Performed by: FAMILY MEDICINE

## 2019-05-14 PROCEDURE — 99214 OFFICE O/P EST MOD 30 MIN: CPT | Performed by: FAMILY MEDICINE

## 2019-05-14 PROCEDURE — 3008F BODY MASS INDEX DOCD: CPT | Performed by: FAMILY MEDICINE

## 2019-05-14 PROCEDURE — 3079F DIAST BP 80-89 MM HG: CPT | Performed by: FAMILY MEDICINE

## 2019-05-14 PROCEDURE — 4010F ACE/ARB THERAPY RXD/TAKEN: CPT | Performed by: FAMILY MEDICINE

## 2019-05-14 RX ORDER — LEVOFLOXACIN 500 MG/1
500 TABLET, FILM COATED ORAL EVERY 24 HOURS
Qty: 7 TABLET | Refills: 0 | Status: SHIPPED | OUTPATIENT
Start: 2019-05-14 | End: 2019-05-21

## 2019-05-14 RX ORDER — EZETIMIBE 10 MG/1
10 TABLET ORAL DAILY
Qty: 90 TABLET | Refills: 1 | Status: SHIPPED | OUTPATIENT
Start: 2019-05-14 | End: 2019-09-27 | Stop reason: SDUPTHER

## 2019-05-14 RX ORDER — GUAIFENESIN AND CODEINE PHOSPHATE 100; 10 MG/5ML; MG/5ML
5 SOLUTION ORAL 3 TIMES DAILY PRN
Qty: 120 ML | Refills: 1 | Status: SHIPPED | OUTPATIENT
Start: 2019-05-14 | End: 2019-08-24

## 2019-05-14 RX ORDER — SILDENAFIL 100 MG/1
100 TABLET, FILM COATED ORAL DAILY PRN
Qty: 10 TABLET | Refills: 9 | Status: SHIPPED | OUTPATIENT
Start: 2019-05-14 | End: 2019-08-30

## 2019-05-14 RX ORDER — NIACIN 1000 MG/1
1000 TABLET, EXTENDED RELEASE ORAL
Qty: 90 TABLET | Refills: 1 | Status: SHIPPED | OUTPATIENT
Start: 2019-05-14 | End: 2019-07-11 | Stop reason: SDUPTHER

## 2019-05-14 RX ORDER — ROSUVASTATIN CALCIUM 10 MG/1
10 TABLET, COATED ORAL DAILY
Qty: 90 TABLET | Refills: 1 | Status: SHIPPED | OUTPATIENT
Start: 2019-05-14 | End: 2019-07-11 | Stop reason: SDUPTHER

## 2019-05-14 RX ORDER — LOSARTAN POTASSIUM 25 MG/1
25 TABLET ORAL DAILY
Qty: 90 TABLET | Refills: 1 | Status: SHIPPED | OUTPATIENT
Start: 2019-05-14 | End: 2019-07-11 | Stop reason: SDUPTHER

## 2019-05-14 RX ORDER — GLIMEPIRIDE 2 MG/1
2 TABLET ORAL DAILY
Qty: 90 TABLET | Refills: 1 | Status: SHIPPED | OUTPATIENT
Start: 2019-05-14 | End: 2019-09-18

## 2019-05-14 RX ORDER — ALBUTEROL SULFATE 90 UG/1
2 AEROSOL, METERED RESPIRATORY (INHALATION) EVERY 4 HOURS PRN
Qty: 1 EACH | Refills: 0 | Status: SHIPPED | OUTPATIENT
Start: 2019-05-14 | End: 2020-04-20

## 2019-05-14 RX ORDER — METHYLPREDNISOLONE 4 MG/1
TABLET ORAL
Qty: 21 EACH | Refills: 0 | Status: SHIPPED | OUTPATIENT
Start: 2019-05-14 | End: 2019-05-23 | Stop reason: ALTCHOICE

## 2019-05-14 RX ORDER — ASPIRIN 325 MG
325 TABLET ORAL ONCE
Qty: 90 TABLET | Refills: 1 | Status: ON HOLD | OUTPATIENT
Start: 2019-05-14 | End: 2019-08-24 | Stop reason: CLARIF

## 2019-05-23 ENCOUNTER — CONSULT (OUTPATIENT)
Dept: NEUROLOGY | Facility: CLINIC | Age: 59
End: 2019-05-23
Payer: COMMERCIAL

## 2019-05-23 VITALS
DIASTOLIC BLOOD PRESSURE: 84 MMHG | BODY MASS INDEX: 32.93 KG/M2 | RESPIRATION RATE: 14 BRPM | HEIGHT: 70 IN | HEART RATE: 94 BPM | WEIGHT: 230 LBS | SYSTOLIC BLOOD PRESSURE: 138 MMHG

## 2019-05-23 DIAGNOSIS — G25.0 ESSENTIAL TREMOR: Primary | ICD-10-CM

## 2019-05-23 PROCEDURE — 99244 OFF/OP CNSLTJ NEW/EST MOD 40: CPT | Performed by: PSYCHIATRY & NEUROLOGY

## 2019-05-23 RX ORDER — PRIMIDONE 50 MG/1
100 TABLET ORAL EVERY 12 HOURS SCHEDULED
Qty: 120 TABLET | Refills: 3 | Status: SHIPPED | OUTPATIENT
Start: 2019-05-23 | End: 2019-12-31

## 2019-06-10 PROCEDURE — 88304 TISSUE EXAM BY PATHOLOGIST: CPT | Performed by: PATHOLOGY

## 2019-07-11 ENCOUNTER — OFFICE VISIT (OUTPATIENT)
Dept: FAMILY MEDICINE CLINIC | Facility: CLINIC | Age: 59
End: 2019-07-11
Payer: COMMERCIAL

## 2019-07-11 VITALS
DIASTOLIC BLOOD PRESSURE: 78 MMHG | HEIGHT: 70 IN | BODY MASS INDEX: 32.07 KG/M2 | SYSTOLIC BLOOD PRESSURE: 112 MMHG | TEMPERATURE: 99.1 F | WEIGHT: 224 LBS

## 2019-07-11 DIAGNOSIS — J01.00 ACUTE NON-RECURRENT MAXILLARY SINUSITIS: Primary | ICD-10-CM

## 2019-07-11 DIAGNOSIS — I10 ESSENTIAL HYPERTENSION: ICD-10-CM

## 2019-07-11 DIAGNOSIS — E11.9 TYPE 2 DIABETES MELLITUS WITHOUT COMPLICATION, UNSPECIFIED WHETHER LONG TERM INSULIN USE (HCC): ICD-10-CM

## 2019-07-11 DIAGNOSIS — Z12.11 SCREENING FOR COLON CANCER: ICD-10-CM

## 2019-07-11 DIAGNOSIS — J40 BRONCHITIS: ICD-10-CM

## 2019-07-11 DIAGNOSIS — E78.2 MIXED HYPERLIPIDEMIA: ICD-10-CM

## 2019-07-11 PROCEDURE — 3074F SYST BP LT 130 MM HG: CPT | Performed by: FAMILY MEDICINE

## 2019-07-11 PROCEDURE — 99213 OFFICE O/P EST LOW 20 MIN: CPT | Performed by: FAMILY MEDICINE

## 2019-07-11 PROCEDURE — 3008F BODY MASS INDEX DOCD: CPT | Performed by: FAMILY MEDICINE

## 2019-07-11 RX ORDER — MOXIFLOXACIN HYDROCHLORIDE 400 MG/1
400 TABLET ORAL DAILY
Qty: 10 TABLET | Refills: 0 | Status: SHIPPED | OUTPATIENT
Start: 2019-07-11 | End: 2019-07-21

## 2019-07-11 RX ORDER — NIACIN 1000 MG/1
1000 TABLET, EXTENDED RELEASE ORAL
Qty: 90 TABLET | Refills: 1 | Status: SHIPPED | OUTPATIENT
Start: 2019-07-11 | End: 2019-09-27 | Stop reason: SDUPTHER

## 2019-07-11 RX ORDER — ROSUVASTATIN CALCIUM 10 MG/1
10 TABLET, COATED ORAL DAILY
Qty: 90 TABLET | Refills: 1 | Status: ON HOLD | OUTPATIENT
Start: 2019-07-11 | End: 2019-08-28 | Stop reason: SDUPTHER

## 2019-07-11 RX ORDER — LOSARTAN POTASSIUM 25 MG/1
25 TABLET ORAL DAILY
Qty: 90 TABLET | Refills: 1 | Status: ON HOLD | OUTPATIENT
Start: 2019-07-11 | End: 2019-08-28 | Stop reason: SDUPTHER

## 2019-07-11 NOTE — PROGRESS NOTES
Assessment/Plan:  Patient will follow with ENT     Diagnoses and all orders for this visit:    Acute non-recurrent maxillary sinusitis  -     moxifloxacin (AVELOX) 400 MG tablet; Take 1 tablet (400 mg total) by mouth daily for 10 days    Screening for colon cancer  -     Ambulatory referral to Gastroenterology; Future  -     Occult Blood, Fecal Immunochemical; Future    Type 2 diabetes mellitus without complication, unspecified whether long term insulin use (HCC)  -     POCT hemoglobin A1c  -     metFORMIN (GLUCOPHAGE) 1000 MG tablet; Take 1 tablet (1,000 mg total) by mouth 2 (two) times a day  -     losartan (COZAAR) 25 mg tablet; Take 1 tablet (25 mg total) by mouth daily  -     niacin (NIASPAN) 1000 MG CR tablet; Take 1 tablet (1,000 mg total) by mouth daily at bedtime  -     rosuvastatin (CRESTOR) 10 MG tablet; Take 1 tablet (10 mg total) by mouth daily    Bronchitis  -     metFORMIN (GLUCOPHAGE) 1000 MG tablet; Take 1 tablet (1,000 mg total) by mouth 2 (two) times a day  -     losartan (COZAAR) 25 mg tablet; Take 1 tablet (25 mg total) by mouth daily    Essential hypertension    Mixed hyperlipidemia          Subjective:      Patient ID: Sajan Fermin is a 60 y o  male  Patient is here with sore throat  Patient will need medications refilled  The patient did see Neurology  The following portions of the patient's history were reviewed and updated as appropriate: allergies, current medications, past family history, past medical history, past social history, past surgical history and problem list     Review of Systems   Constitutional: Negative  HENT: Positive for sore throat  Eyes: Negative  Respiratory: Negative  Cardiovascular: Negative  Gastrointestinal: Negative  Endocrine: Negative  Genitourinary: Negative  Musculoskeletal: Negative  Skin: Negative  Allergic/Immunologic: Negative  Neurological: Negative  Hematological: Negative      Psychiatric/Behavioral: Negative  Objective:      /78 (BP Location: Right arm, Patient Position: Sitting, Cuff Size: Large)   Temp 99 1 °F (37 3 °C) (Tympanic)   Ht 5' 10" (1 778 m)   Wt 102 kg (224 lb)   BMI 32 14 kg/m²          Physical Exam   Constitutional: He is oriented to person, place, and time  He appears well-developed and well-nourished  No distress  HENT:   Head: Normocephalic  Right Ear: Hearing, tympanic membrane, external ear and ear canal normal    Left Ear: Hearing, tympanic membrane, external ear and ear canal normal    Mouth/Throat: Posterior oropharyngeal erythema present  No oropharyngeal exudate  Eyes: Pupils are equal, round, and reactive to light  EOM are normal  Right eye exhibits no discharge  Left eye exhibits no discharge  No scleral icterus  Neck: Normal range of motion  Neck supple  No thyromegaly present  Cardiovascular: Normal rate, regular rhythm, normal heart sounds and intact distal pulses  Exam reveals no gallop and no friction rub  No murmur heard  Pulmonary/Chest: Effort normal and breath sounds normal  No respiratory distress  He has no wheezes  He has no rales  He exhibits no tenderness  Abdominal: Soft  Bowel sounds are normal  He exhibits no distension  There is no tenderness  There is no rebound and no guarding  Musculoskeletal: Normal range of motion  He exhibits deformity  He exhibits no edema or tenderness  Lymphadenopathy:     He has no cervical adenopathy  Neurological: He is oriented to person, place, and time  No cranial nerve deficit  He exhibits normal muscle tone  Coordination normal    Skin: Skin is warm and dry  No rash noted  He is not diaphoretic  No erythema  No pallor  Psychiatric: He has a normal mood and affect  His behavior is normal  Judgment and thought content normal    Nursing note and vitals reviewed

## 2019-07-22 DIAGNOSIS — J01.10 ACUTE NON-RECURRENT FRONTAL SINUSITIS: Primary | ICD-10-CM

## 2019-07-22 RX ORDER — CEPHALEXIN 500 MG/1
500 CAPSULE ORAL EVERY 12 HOURS SCHEDULED
Qty: 30 CAPSULE | Refills: 0 | Status: SHIPPED | OUTPATIENT
Start: 2019-07-22 | End: 2019-08-21

## 2019-08-24 ENCOUNTER — OFFICE VISIT (OUTPATIENT)
Dept: URGENT CARE | Age: 59
End: 2019-08-24
Payer: COMMERCIAL

## 2019-08-24 ENCOUNTER — HOSPITAL ENCOUNTER (INPATIENT)
Facility: HOSPITAL | Age: 59
LOS: 4 days | Discharge: HOME | DRG: 247 | End: 2019-08-28
Attending: EMERGENCY MEDICINE | Admitting: INTERNAL MEDICINE
Payer: COMMERCIAL

## 2019-08-24 ENCOUNTER — APPOINTMENT (EMERGENCY)
Dept: RADIOLOGY | Facility: HOSPITAL | Age: 59
DRG: 247 | End: 2019-08-24
Payer: COMMERCIAL

## 2019-08-24 VITALS
OXYGEN SATURATION: 98 % | WEIGHT: 226 LBS | BODY MASS INDEX: 32.43 KG/M2 | RESPIRATION RATE: 18 BRPM | SYSTOLIC BLOOD PRESSURE: 176 MMHG | TEMPERATURE: 96.9 F | DIASTOLIC BLOOD PRESSURE: 74 MMHG | HEART RATE: 70 BPM

## 2019-08-24 DIAGNOSIS — E11.9 TYPE 2 DIABETES MELLITUS WITHOUT COMPLICATION, UNSPECIFIED WHETHER LONG TERM INSULIN USE (HCC): ICD-10-CM

## 2019-08-24 DIAGNOSIS — I21.4 NSTEMI (NON-ST ELEVATED MYOCARDIAL INFARCTION) (HCC): Primary | ICD-10-CM

## 2019-08-24 DIAGNOSIS — R07.9 CHEST PAIN, UNSPECIFIED TYPE: Primary | ICD-10-CM

## 2019-08-24 DIAGNOSIS — J40 BRONCHITIS: ICD-10-CM

## 2019-08-24 PROBLEM — G25.0 ESSENTIAL TREMOR: Status: ACTIVE | Noted: 2019-08-24

## 2019-08-24 LAB
ALBUMIN SERPL BCP-MCNC: 3.4 G/DL (ref 3.5–5)
ALP SERPL-CCNC: 65 U/L (ref 46–116)
ALT SERPL W P-5'-P-CCNC: 26 U/L (ref 12–78)
ANION GAP SERPL CALCULATED.3IONS-SCNC: 5 MMOL/L (ref 4–13)
APTT PPP: 39 SECONDS (ref 23–37)
APTT PPP: 52 SECONDS (ref 23–37)
AST SERPL W P-5'-P-CCNC: 48 U/L (ref 5–45)
BASOPHILS # BLD AUTO: 0.08 THOUSANDS/ΜL (ref 0–0.1)
BASOPHILS NFR BLD AUTO: 1 % (ref 0–1)
BILIRUB SERPL-MCNC: 0.79 MG/DL (ref 0.2–1)
BUN SERPL-MCNC: 13 MG/DL (ref 5–25)
CALCIUM SERPL-MCNC: 8.9 MG/DL (ref 8.3–10.1)
CHLORIDE SERPL-SCNC: 106 MMOL/L (ref 100–108)
CO2 SERPL-SCNC: 27 MMOL/L (ref 21–32)
CREAT SERPL-MCNC: 0.76 MG/DL (ref 0.6–1.3)
EOSINOPHIL # BLD AUTO: 0.37 THOUSAND/ΜL (ref 0–0.61)
EOSINOPHIL NFR BLD AUTO: 5 % (ref 0–6)
ERYTHROCYTE [DISTWIDTH] IN BLOOD BY AUTOMATED COUNT: 11.9 % (ref 11.6–15.1)
EST. AVERAGE GLUCOSE BLD GHB EST-MCNC: 137 MG/DL
GFR SERPL CREATININE-BSD FRML MDRD: 99 ML/MIN/1.73SQ M
GLUCOSE SERPL-MCNC: 130 MG/DL (ref 65–140)
GLUCOSE SERPL-MCNC: 189 MG/DL (ref 65–140)
GLUCOSE SERPL-MCNC: 99 MG/DL (ref 65–140)
HBA1C MFR BLD: 6.4 % (ref 4.2–6.3)
HCT VFR BLD AUTO: 40.4 % (ref 36.5–49.3)
HGB BLD-MCNC: 13.5 G/DL (ref 12–17)
IMM GRANULOCYTES # BLD AUTO: 0.03 THOUSAND/UL (ref 0–0.2)
IMM GRANULOCYTES NFR BLD AUTO: 0 % (ref 0–2)
INR PPP: 1.03 (ref 0.84–1.19)
LYMPHOCYTES # BLD AUTO: 2.2 THOUSANDS/ΜL (ref 0.6–4.47)
LYMPHOCYTES NFR BLD AUTO: 30 % (ref 14–44)
MCH RBC QN AUTO: 31.6 PG (ref 26.8–34.3)
MCHC RBC AUTO-ENTMCNC: 33.4 G/DL (ref 31.4–37.4)
MCV RBC AUTO: 95 FL (ref 82–98)
MONOCYTES # BLD AUTO: 0.58 THOUSAND/ΜL (ref 0.17–1.22)
MONOCYTES NFR BLD AUTO: 8 % (ref 4–12)
NEUTROPHILS # BLD AUTO: 4.14 THOUSANDS/ΜL (ref 1.85–7.62)
NEUTS SEG NFR BLD AUTO: 56 % (ref 43–75)
NRBC BLD AUTO-RTO: 0 /100 WBCS
PLATELET # BLD AUTO: 195 THOUSANDS/UL (ref 149–390)
PMV BLD AUTO: 9.9 FL (ref 8.9–12.7)
POTASSIUM SERPL-SCNC: 4.3 MMOL/L (ref 3.5–5.3)
POTASSIUM SERPL-SCNC: 5.8 MMOL/L (ref 3.5–5.3)
PROT SERPL-MCNC: 7.6 G/DL (ref 6.4–8.2)
PROTHROMBIN TIME: 13.6 SECONDS (ref 11.6–14.5)
RBC # BLD AUTO: 4.27 MILLION/UL (ref 3.88–5.62)
SODIUM SERPL-SCNC: 138 MMOL/L (ref 136–145)
TROPONIN I SERPL-MCNC: 0.39 NG/ML
TROPONIN I SERPL-MCNC: 0.78 NG/ML
TROPONIN I SERPL-MCNC: 1.22 NG/ML
WBC # BLD AUTO: 7.4 THOUSAND/UL (ref 4.31–10.16)

## 2019-08-24 PROCEDURE — 82948 REAGENT STRIP/BLOOD GLUCOSE: CPT

## 2019-08-24 PROCEDURE — 86803 HEPATITIS C AB TEST: CPT | Performed by: INTERNAL MEDICINE

## 2019-08-24 PROCEDURE — 96375 TX/PRO/DX INJ NEW DRUG ADDON: CPT

## 2019-08-24 PROCEDURE — 36415 COLL VENOUS BLD VENIPUNCTURE: CPT | Performed by: EMERGENCY MEDICINE

## 2019-08-24 PROCEDURE — 99291 CRITICAL CARE FIRST HOUR: CPT | Performed by: EMERGENCY MEDICINE

## 2019-08-24 PROCEDURE — 85730 THROMBOPLASTIN TIME PARTIAL: CPT | Performed by: EMERGENCY MEDICINE

## 2019-08-24 PROCEDURE — 93005 ELECTROCARDIOGRAM TRACING: CPT | Performed by: FAMILY MEDICINE

## 2019-08-24 PROCEDURE — 83036 HEMOGLOBIN GLYCOSYLATED A1C: CPT | Performed by: INTERNAL MEDICINE

## 2019-08-24 PROCEDURE — 99285 EMERGENCY DEPT VISIT HI MDM: CPT

## 2019-08-24 PROCEDURE — 80053 COMPREHEN METABOLIC PANEL: CPT | Performed by: EMERGENCY MEDICINE

## 2019-08-24 PROCEDURE — 85730 THROMBOPLASTIN TIME PARTIAL: CPT | Performed by: INTERNAL MEDICINE

## 2019-08-24 PROCEDURE — 84132 ASSAY OF SERUM POTASSIUM: CPT | Performed by: EMERGENCY MEDICINE

## 2019-08-24 PROCEDURE — 84484 ASSAY OF TROPONIN QUANT: CPT | Performed by: EMERGENCY MEDICINE

## 2019-08-24 PROCEDURE — G0382 LEV 3 HOSP TYPE B ED VISIT: HCPCS | Performed by: FAMILY MEDICINE

## 2019-08-24 PROCEDURE — 84484 ASSAY OF TROPONIN QUANT: CPT | Performed by: INTERNAL MEDICINE

## 2019-08-24 PROCEDURE — 85025 COMPLETE CBC W/AUTO DIFF WBC: CPT | Performed by: EMERGENCY MEDICINE

## 2019-08-24 PROCEDURE — 85610 PROTHROMBIN TIME: CPT | Performed by: EMERGENCY MEDICINE

## 2019-08-24 PROCEDURE — 93005 ELECTROCARDIOGRAM TRACING: CPT

## 2019-08-24 PROCEDURE — 99223 1ST HOSP IP/OBS HIGH 75: CPT | Performed by: INTERNAL MEDICINE

## 2019-08-24 PROCEDURE — 71046 X-RAY EXAM CHEST 2 VIEWS: CPT

## 2019-08-24 PROCEDURE — 96374 THER/PROPH/DIAG INJ IV PUSH: CPT

## 2019-08-24 RX ORDER — HEPARIN SODIUM 10000 [USP'U]/100ML
3-20 INJECTION, SOLUTION INTRAVENOUS
Status: DISCONTINUED | OUTPATIENT
Start: 2019-08-24 | End: 2019-08-26

## 2019-08-24 RX ORDER — ASPIRIN 81 MG/1
81 TABLET ORAL DAILY
Status: DISCONTINUED | OUTPATIENT
Start: 2019-08-25 | End: 2019-08-28 | Stop reason: HOSPADM

## 2019-08-24 RX ORDER — ACETAMINOPHEN 325 MG/1
650 TABLET ORAL EVERY 4 HOURS PRN
Status: DISCONTINUED | OUTPATIENT
Start: 2019-08-24 | End: 2019-08-28 | Stop reason: HOSPADM

## 2019-08-24 RX ORDER — ALBUTEROL SULFATE 90 UG/1
2 AEROSOL, METERED RESPIRATORY (INHALATION) EVERY 4 HOURS PRN
Status: DISCONTINUED | OUTPATIENT
Start: 2019-08-24 | End: 2019-08-28 | Stop reason: HOSPADM

## 2019-08-24 RX ORDER — HEPARIN SODIUM 1000 [USP'U]/ML
4000 INJECTION, SOLUTION INTRAVENOUS; SUBCUTANEOUS AS NEEDED
Status: DISCONTINUED | OUTPATIENT
Start: 2019-08-24 | End: 2019-08-26

## 2019-08-24 RX ORDER — FLUTICASONE PROPIONATE 50 MCG
2 SPRAY, SUSPENSION (ML) NASAL DAILY PRN
Status: DISCONTINUED | OUTPATIENT
Start: 2019-08-24 | End: 2019-08-28 | Stop reason: HOSPADM

## 2019-08-24 RX ORDER — LOSARTAN POTASSIUM 25 MG/1
25 TABLET ORAL DAILY
Status: DISCONTINUED | OUTPATIENT
Start: 2019-08-24 | End: 2019-08-25

## 2019-08-24 RX ORDER — EZETIMIBE 10 MG/1
10 TABLET ORAL DAILY
Status: DISCONTINUED | OUTPATIENT
Start: 2019-08-24 | End: 2019-08-28 | Stop reason: HOSPADM

## 2019-08-24 RX ORDER — ATORVASTATIN CALCIUM 80 MG/1
80 TABLET, FILM COATED ORAL
Status: DISCONTINUED | OUTPATIENT
Start: 2019-08-24 | End: 2019-08-28 | Stop reason: HOSPADM

## 2019-08-24 RX ORDER — ASPIRIN 81 MG/1
243 TABLET, CHEWABLE ORAL ONCE
Status: DISCONTINUED | OUTPATIENT
Start: 2019-08-24 | End: 2019-08-24 | Stop reason: CLARIF

## 2019-08-24 RX ORDER — PRIMIDONE 50 MG/1
100 TABLET ORAL EVERY 12 HOURS SCHEDULED
Status: DISCONTINUED | OUTPATIENT
Start: 2019-08-24 | End: 2019-08-28 | Stop reason: HOSPADM

## 2019-08-24 RX ORDER — NICOTINE 21 MG/24HR
1 PATCH, TRANSDERMAL 24 HOURS TRANSDERMAL DAILY
Status: DISCONTINUED | OUTPATIENT
Start: 2019-08-24 | End: 2019-08-28 | Stop reason: HOSPADM

## 2019-08-24 RX ORDER — HEPARIN SODIUM 1000 [USP'U]/ML
2000 INJECTION, SOLUTION INTRAVENOUS; SUBCUTANEOUS AS NEEDED
Status: DISCONTINUED | OUTPATIENT
Start: 2019-08-24 | End: 2019-08-26

## 2019-08-24 RX ORDER — HEPARIN SODIUM 1000 [USP'U]/ML
4000 INJECTION, SOLUTION INTRAVENOUS; SUBCUTANEOUS ONCE
Status: COMPLETED | OUTPATIENT
Start: 2019-08-24 | End: 2019-08-24

## 2019-08-24 RX ADMIN — METOPROLOL TARTRATE 25 MG: 25 TABLET, FILM COATED ORAL at 13:01

## 2019-08-24 RX ADMIN — PRIMIDONE 100 MG: 50 TABLET ORAL at 20:13

## 2019-08-24 RX ADMIN — HEPARIN SODIUM 2000 UNITS: 1000 INJECTION, SOLUTION INTRAVENOUS; SUBCUTANEOUS at 20:29

## 2019-08-24 RX ADMIN — HEPARIN SODIUM AND DEXTROSE 11.1 UNITS/KG/HR: 10000; 5 INJECTION INTRAVENOUS at 13:48

## 2019-08-24 RX ADMIN — ATORVASTATIN CALCIUM 80 MG: 80 TABLET, FILM COATED ORAL at 13:01

## 2019-08-24 RX ADMIN — METOPROLOL TARTRATE 25 MG: 25 TABLET, FILM COATED ORAL at 20:14

## 2019-08-24 RX ADMIN — EZETIMIBE 10 MG: 10 TABLET ORAL at 15:59

## 2019-08-24 RX ADMIN — TICAGRELOR 180 MG: 90 TABLET ORAL at 16:54

## 2019-08-24 RX ADMIN — HEPARIN SODIUM 4000 UNITS: 1000 INJECTION, SOLUTION INTRAVENOUS; SUBCUTANEOUS at 13:46

## 2019-08-24 RX ADMIN — ASPIRIN 243 MG: 81 TABLET, CHEWABLE ORAL at 11:58

## 2019-08-24 RX ADMIN — LOSARTAN POTASSIUM 25 MG: 25 TABLET, FILM COATED ORAL at 15:58

## 2019-08-24 RX ADMIN — NICOTINE 1 PATCH: 21 PATCH TRANSDERMAL at 15:58

## 2019-08-24 NOTE — ED PROVIDER NOTES
History  Chief Complaint   Patient presents with    Chest Pain     States this AM with chest pain, nausea, sweating, and numbness in fingers took aspirin without relief seen urgent care recived another 324 mg aspirin without relief was given 1 nitro in ambulance instant relife, still little numb in fingers all other symptoms resolved     A 24-year-old male with past history of diabetes, hypercholesterolemia and hypertension; presents from urgent care for chest pain  Patient states he had just returned home around 10 am this morning when he developed chest pain  Chest pain was located substernally, described as a burning and pressure like sensation  Pain was nonradiating  Patient reports he began profusely sweating and developed nausea at that time  Patient also complains of bilateral hand numbness at symptom onset  Patient denies associated shortness of breath, cough, vomiting, abdominal pain, diarrhea, peripheral edema and rashes  Patient was initially seen at urgent care who provided full-dose aspirin, and referred to the emergency department  Patient did receive one dose of sublingual nitro in the ambulance, with complete resolution of his symptoms  Patient denies history of similar symptoms  Patient is a cigarette smoker, and difficult family history  A/P:  Chest pain, symptoms concerning for ACS  First nurse lab work has already been obtained, and resulted with an elevated troponin concerning for NSTEMI  Will plan to start heparin gtt and discuss with Cardiology  Patient will require admission  History provided by:  Patient and medical records  Chest Pain   Associated symptoms: diaphoresis and nausea        Prior to Admission Medications   Prescriptions Last Dose Informant Patient Reported? Taking?    albuterol (PROAIR HFA) 90 mcg/act inhaler  Self No Yes   Sig: Inhale 2 puffs every 4 (four) hours as needed for shortness of breath   ezetimibe (ZETIA) 10 mg tablet  Self No Yes   Sig: Take 1 tablet (10 mg total) by mouth daily   fluticasone (FLONASE) 50 mcg/act nasal spray  Self No Yes   Si sprays into each nostril daily   Patient taking differently: 2 sprays into each nostril daily as needed    glimepiride (AMARYL) 2 mg tablet  Self No Yes   Sig: Take 1 tablet (2 mg total) by mouth daily   losartan (COZAAR) 25 mg tablet   No Yes   Sig: Take 1 tablet (25 mg total) by mouth daily   metFORMIN (GLUCOPHAGE) 1000 MG tablet   No Yes   Sig: Take 1 tablet (1,000 mg total) by mouth 2 (two) times a day   niacin (NIASPAN) 1000 MG CR tablet   No Yes   Sig: Take 1 tablet (1,000 mg total) by mouth daily at bedtime   primidone (MYSOLINE) 50 mg tablet  Self No Yes   Sig: Take 2 tablets (100 mg total) by mouth every 12 (twelve) hours   rosuvastatin (CRESTOR) 10 MG tablet   No Yes   Sig: Take 1 tablet (10 mg total) by mouth daily   sildenafil (VIAGRA) 100 mg tablet  Self No Yes   Sig: Take 1 tablet (100 mg total) by mouth daily as needed for erectile dysfunction      Facility-Administered Medications: None       Past Medical History:   Diagnosis Date    Asthma     Diabetes ([address] )     Hypercholesteremia        Past Surgical History:   Procedure Laterality Date    COLONOSCOPY      RESOLVED:     MASS EXCISION Right     post auricular cyst removal - in office - Dr Shayna Geiger - 6/10/19    TONSILLECTOMY         Family History   Problem Relation Age of Onset    Liver cancer Father     Skin cancer Father     Tremor Mother     Tremor Maternal Grandmother      I have reviewed and agree with the history as documented      Social History     Tobacco Use    Smoking status: Current Some Day Smoker     Packs/day: 1 00     Years: 40 00     Pack years: 40 00     Types: Cigarettes    Smokeless tobacco: Never Used    Tobacco comment: he has quit several times before   Substance Use Topics    Alcohol use: Never     Frequency: Never    Drug use: Yes     Types: Marijuana        Review of Systems   Constitutional: Positive for diaphoresis  Cardiovascular: Positive for chest pain  Gastrointestinal: Positive for nausea  All other systems reviewed and are negative        Physical Exam  Physical Exam  General Appearance: alert and oriented, nad, non toxic appearing  Skin:  Warm, dry, intact  HEENT: atraumatic, normocephalic  Neck: Supple, trachea midline  Cardiac: RRR; no murmurs, rub, gallops  Pulmonary: lungs CTAB; no wheezes, rales, rhonchi  Gastrointestinal: abdomen soft, nontender, nondistended; no guarding or rebound tenderness; good bowel sounds, no mass or bruits  Extremities:  no pedal edema, 2+ pulses; no calf tenderness, no clubbing, no cyanosis  Neuro:  no focal motor or sensory deficits, CN 2-12 grossly intact  Psych:  Normal mood and affect, normal judgement and insight      Vital Signs  ED Triage Vitals [08/24/19 1143]   Temperature Pulse Respirations Blood Pressure SpO2   97 8 °F (36 6 °C) 75 18 (!) 184/91 98 %      Temp Source Heart Rate Source Patient Position - Orthostatic VS BP Location FiO2 (%)   Oral Monitor Sitting Right arm --      Pain Score       No Pain           Vitals:    08/24/19 1215 08/24/19 1400 08/24/19 1430 08/24/19 1454   BP: 164/81 152/81 163/88 160/70   Pulse: 70 66 68 70   Patient Position - Orthostatic VS: Sitting  Lying Lying         Visual Acuity      ED Medications  Medications   heparin (porcine) 25,000 units in 250 mL infusion (premix) (11 1 Units/kg/hr × 90 kg (Order-Specific) Intravenous New Bag 8/24/19 1348)   heparin (porcine) injection 4,000 Units (has no administration in time range)   heparin (porcine) injection 2,000 Units (has no administration in time range)   metoprolol tartrate (LOPRESSOR) tablet 25 mg (25 mg Oral Given 8/24/19 1301)   atorvastatin (LIPITOR) tablet 80 mg (80 mg Oral Given 8/24/19 1301)   heparin (porcine) injection 4,000 Units (4,000 Units Intravenous Given 8/24/19 1346)       Diagnostic Studies  Results Reviewed     Procedure Component Value Units Date/Time Protime-INR [[id number]]  (Normal) Collected:  08/24/19 1307    Lab Status:  Final result Specimen:  Blood from Arm, Right Updated:  08/24/19 1337     Protime 13 6 seconds      INR 1 03    APTT [[id number]]  (Abnormal) Collected:  08/24/19 1307    Lab Status:  Final result Specimen:  Blood from Arm, Right Updated:  08/24/19 1337     PTT 39 seconds     Potassium [[id number]]  (Normal) Collected:  08/24/19 1307    Lab Status:  Final result Specimen:  Blood from Arm, Right Updated:  08/24/19 1337     Potassium 4 3 mmol/L     Comprehensive metabolic panel [[id number]]  (Abnormal) Collected:  08/24/19 1147    Lab Status:  Final result Specimen:  Blood from Arm, Right Updated:  08/24/19 1218     Sodium 138 mmol/L      Potassium 5 8 mmol/L      Chloride 106 mmol/L      CO2 27 mmol/L      ANION GAP 5 mmol/L      BUN 13 mg/dL      Creatinine 0 76 mg/dL      Glucose 130 mg/dL      Calcium 8 9 mg/dL      AST 48 U/L      ALT 26 U/L      Alkaline Phosphatase 65 U/L      Total Protein 7 6 g/dL      Albumin 3 4 g/dL      Total Bilirubin 0 79 mg/dL      eGFR 99 ml/min/1 73sq m     Narrative:       [place] guidelines for Chronic Kidney Disease (CKD):     Stage 1 with normal or high GFR (GFR > 90 mL/min/1 73 square meters)    Stage 2 Mild CKD (GFR = 60-89 mL/min/1 73 square meters)    Stage 3A Moderate CKD (GFR = 45-59 mL/min/1 73 square meters)    Stage 3B Moderate CKD (GFR = 30-44 mL/min/1 73 square meters)    Stage 4 Severe CKD (GFR = 15-29 mL/min/1 73 square meters)    Stage 5 End Stage CKD (GFR <15 mL/min/1 73 square meters)  Note: GFR calculation is accurate only with a steady state creatinine    Troponin I [[id number]]  (Abnormal) Collected:  08/24/19 1147    Lab Status:  Final result Specimen:  Blood from Arm, Right Updated:  08/24/19 1214     Troponin I 0 39 ng/mL     CBC and differential [[id number]] Collected:  08/24/19 1147    Lab Status:  Final result Specimen:  Blood from Arm, Right Updated: 08/24/19 1152     WBC 7 40 Thousand/uL      RBC 4 27 Million/uL      Hemoglobin 13 5 g/dL      Hematocrit 40 4 %      MCV 95 fL      MCH 31 6 pg      MCHC 33 4 g/dL      RDW 11 9 %      MPV 9 9 fL      Platelets 980 Thousands/uL      nRBC 0 /100 WBCs      Neutrophils Relative 56 %      Immat GRANS % 0 %      Lymphocytes Relative 30 %      Monocytes Relative 8 %      Eosinophils Relative 5 %      Basophils Relative 1 %      Neutrophils Absolute 4 14 Thousands/µL      Immature Grans Absolute 0 03 Thousand/uL      Lymphocytes Absolute 2 20 Thousands/µL      Monocytes Absolute 0 58 Thousand/µL      Eosinophils Absolute 0 37 Thousand/µL      Basophils Absolute 0 08 Thousands/µL                  XR chest 2 views   ED Interpretation by Waleska Kirkland DO (08/24 1240)   No acute disease                 Procedures  CriticalCare Time  Performed by: Waleska Kirkland DO  Authorized by: Waleska Kirkland DO     Critical care provider statement:     Critical care time (minutes):  30    Critical care time was exclusive of:  Separately billable procedures and treating other patients and teaching time    Critical care was necessary to treat or prevent imminent or life-threatening deterioration of the following conditions:  Cardiac failure    Critical care was time spent personally by me on the following activities:  Obtaining history from patient or surrogate, development of treatment plan with patient or surrogate, examination of patient, evaluation of patient's response to treatment, re-evaluation of patient's condition, ordering and review of radiographic studies, ordering and performing treatments and interventions, discussions with consultants, ordering and review of laboratory studies and review of old charts    I assumed direction of critical care for this patient from another provider in my specialty: no         ECG 12 Lead Documentation  Date/Time: today/date: 8/24/2019  Performed by: Katelyn Roberson    ECG reviewed by me, the ED Provider: yes    Patient location:  ED   Previous ECG:  Compared to current, no change (EKG provided from urgent care)  Rate:  73  ECG rate assessment: normal    Rhythm: sinus rhythm    Ectopy:  none    QRS axis:  Normal  Intervals: normal   Q waves: None   ST segments:  Normal  T waves: normal      Impression: Normal EKG         ED Course  ED Course as of Aug 24 1512   Sat Aug 24, 2019   1238 Moderately hemolyzed, will recheck  Potassium(!): 5 8   1245 Spoke with Dr Felipa Saldana, cardiology on call  Agrees with heparin gtt  Recommends starting Lopressor 25 mg q12 and Lipitor 80 mg  Also recommends discussing starting Norvasc with [facility] if BP remains elevated  1327 Pt resting comfortably, no recurrence of symptoms  Will proceed with admission        1354 Potassium: 4 3         HEART Risk Score      Most Recent Value   History  2 Filed at: 08/24/2019 1243   ECG  0 Filed at: 08/24/2019 1243   Age  1 Filed at: 08/24/2019 1243   Risk Factors  2 Filed at: 08/24/2019 1243   Troponin  1 Filed at: 08/24/2019 1243   Heart Score Risk Calculator   History  2 Filed at: 08/24/2019 1243   ECG  0 Filed at: 08/24/2019 1243   Age  1 Filed at: 08/24/2019 1243   Risk Factors  2 Filed at: 08/24/2019 1243   Troponin  1 Filed at: 08/24/2019 1243   HEART Score  6 Filed at: 08/24/2019 1243   HEART Score  6 Filed at: 08/24/2019 1243                            MDM    Disposition  Final diagnoses:   NSTEMI (non-ST elevated myocardial infarction) [facility])     Time reflects when diagnosis was documented in both MDM as applicable and the Disposition within this note     Time User Action Codes Description Comment    8/24/2019  1:54 PM Katherine Wing [address] [I21 4] NSTEMI (non-ST elevated myocardial infarction) [facility])       ED Disposition     ED Disposition Condition Date/Time Comment    Admit Stable Sat Aug 24, 2019  1:54 PM Case was discussed with MIGUEL and the patient's admission status was agreed to be Admission Status: inpatient status to the service of Dr Julianna Warren   Follow-up Information    None         Current Discharge Medication List      CONTINUE these medications which have NOT CHANGED    Details   albuterol (PROAIR HFA) 90 mcg/act inhaler Inhale 2 puffs every 4 (four) hours as needed for shortness of breath  Qty: 1 each, Refills: 0    Comments: Substitution to a formulary equivalent within the same pharmaceutical class is authorized  Associated Diagnoses: Type 2 diabetes mellitus without complication, unspecified whether long term insulin use ([facility] ); Bronchitis      ezetimibe (ZETIA) 10 mg tablet Take 1 tablet (10 mg total) by mouth daily  Qty: 90 tablet, Refills: 1    Associated Diagnoses: Type 2 diabetes mellitus without complication, unspecified whether long term insulin use ([facility])      fluticasone (FLONASE) 50 mcg/act nasal spray 2 sprays into each nostril daily  Qty: 1 Bottle, Refills: 11    Associated Diagnoses: Dysfunction of both eustachian tubes      glimepiride (AMARYL) 2 mg tablet Take 1 tablet (2 mg total) by mouth daily  Qty: 90 tablet, Refills: 1    Associated Diagnoses: Type 2 diabetes mellitus without complication, unspecified whether long term insulin use ([facility] ); Bronchitis      losartan (COZAAR) 25 mg tablet Take 1 tablet (25 mg total) by mouth daily  Qty: 90 tablet, Refills: 1    Associated Diagnoses: Type 2 diabetes mellitus without complication, unspecified whether long term insulin use ([facility] ); Bronchitis      metFORMIN (GLUCOPHAGE) 1000 MG tablet Take 1 tablet (1,000 mg total) by mouth 2 (two) times a day  Qty: 180 tablet, Refills: 1    Associated Diagnoses: Type 2 diabetes mellitus without complication, unspecified whether long term insulin use ([facility] );  Bronchitis      niacin (NIASPAN) 1000 MG CR tablet Take 1 tablet (1,000 mg total) by mouth daily at bedtime  Qty: 90 tablet, Refills: 1    Associated Diagnoses: Type 2 diabetes mellitus without complication, unspecified whether long term insulin use ([facility])      primidone (MYSOLINE) 50 mg tablet Take 2 tablets (100 mg total) by mouth every 12 (twelve) hours  Qty: 120 tablet, Refills: 3    Associated Diagnoses: Essential tremor      rosuvastatin (CRESTOR) 10 MG tablet Take 1 tablet (10 mg total) by mouth daily  Qty: 90 tablet, Refills: 1    Associated Diagnoses: Type 2 diabetes mellitus without complication, unspecified whether long term insulin use ([facility])      sildenafil (VIAGRA) 100 mg tablet Take 1 tablet (100 mg total) by mouth daily as needed for erectile dysfunction  Qty: 10 tablet, Refills: 9    Associated Diagnoses: Erectile disorder due to medical condition in male           No discharge procedures on file      ED Provider  Electronically Signed by           Deidra Jung DO  08/24/19 0443

## 2019-08-24 NOTE — PROGRESS NOTES
[address] [facility]        NAME: Esa Rizvi is a 60 y o  male  : 1960    MRN: [id number]  DATE: 2019  TIME: 10:57 AM    Assessment and Plan   Chest pain, unspecified type [R07 9]  1  Chest pain, unspecified type  Transfer to other facility    aspirin chewable tablet 243 mg         Patient Instructions       Follow up with PCP in 3-5 days  Proceed to  ER if symptoms worsen  Chief Complaint     Chief Complaint   Patient presents with    Chest Pain     began 10 minutes ago;  sweating and nausea;  feel better now;  took aspirin 15 min pta         History of Present Illness       Chest Pain (began 10 minutes ago; sweating and nausea; feel better now; took aspirin 15 min pta)  Patient took a single baby aspirin with the chest pain started  Family transport him here to the urgent care  Patient feels a pressure/burning midsternal rotating to the back  Chest Pain    This is a new problem  The current episode started today  The onset quality is sudden  The problem occurs constantly  The problem has been unchanged  The pain is present in the substernal region  The pain is severe  The quality of the pain is described as heavy, burning and pressure  The pain radiates to the mid back  Associated symptoms include diaphoresis, dizziness and weakness  Review of Systems   Review of Systems   Constitutional: Positive for diaphoresis and fatigue  HENT: Negative  Respiratory: Negative  Cardiovascular: Positive for chest pain  Neurological: Positive for dizziness and weakness           Current Medications       Current Outpatient Medications:     albuterol (PROAIR HFA) 90 mcg/act inhaler, Inhale 2 puffs every 4 (four) hours as needed for shortness of breath, Disp: 1 each, Rfl: 0    aspirin 325 mg tablet, Take 1 tablet (325 mg total) by mouth once for 1 dose, Disp: 90 tablet, Rfl: 1    ezetimibe (ZETIA) 10 mg tablet, Take 1 tablet (10 mg total) by mouth daily, Disp: 90 tablet, Rfl: 1    fluticasone (FLONASE) 50 mcg/act nasal spray, 2 sprays into each nostril daily, Disp: 1 Bottle, Rfl: 11    glimepiride (AMARYL) 2 mg tablet, Take 1 tablet (2 mg total) by mouth daily, Disp: 90 tablet, Rfl: 1    guaifenesin-codeine (GUAIFENESIN AC) 100-10 MG/5ML liquid, Take 5 mL by mouth 3 (three) times a day as needed for cough, Disp: 120 mL, Rfl: 1    losartan (COZAAR) 25 mg tablet, Take 1 tablet (25 mg total) by mouth daily, Disp: 90 tablet, Rfl: 1    metFORMIN (GLUCOPHAGE) 1000 MG tablet, Take 1 tablet (1,000 mg total) by mouth 2 (two) times a day, Disp: 180 tablet, Rfl: 1    niacin (NIASPAN) 1000 MG CR tablet, Take 1 tablet (1,000 mg total) by mouth daily at bedtime, Disp: 90 tablet, Rfl: 1    primidone (MYSOLINE) 50 mg tablet, Take 2 tablets (100 mg total) by mouth every 12 (twelve) hours, Disp: 120 tablet, Rfl: 3    rosuvastatin (CRESTOR) 10 MG tablet, Take 1 tablet (10 mg total) by mouth daily, Disp: 90 tablet, Rfl: 1    sildenafil (VIAGRA) 100 mg tablet, Take 1 tablet (100 mg total) by mouth daily as needed for erectile dysfunction, Disp: 10 tablet, Rfl: 9    Current Facility-Administered Medications:     aspirin chewable tablet 243 mg, 243 mg, Oral, Once, [employer], DO    Current Allergies     Allergies as of 08/24/2019 - Reviewed 08/24/2019   Allergen Reaction Noted    Penicillins Throat Swelling 01/02/2013    Shellfish allergy Throat Swelling 01/02/2013            The following portions of the patient's history were reviewed and updated as appropriate: allergies, current medications, past family history, past medical history, past social history, past surgical history and problem list      Past Medical History:   Diagnosis Date    Asthma     Diabetes ([address] )     Hypercholesteremia        Past Surgical History:   Procedure Laterality Date    COLONOSCOPY      RESOLVED: 2003    MASS EXCISION Right     post auricular cyst removal - in office - Dr Dominic Shrestha - 6/10/19    TONSILLECTOMY         Family History   Problem Relation Age of Onset    Liver cancer Father     Skin cancer Father     Tremor Mother     Tremor Maternal Grandmother          Medications have been verified  Objective   BP (!) 176/74   Pulse 70   Temp (!) 96 9 °F (36 1 °C)   Resp 18   Wt 103 kg (226 lb)   SpO2 98%   BMI 32 43 kg/m²        Physical Exam     Physical Exam   Constitutional: He appears well-developed and well-nourished  Cardiovascular: Normal rate and regular rhythm     Pulmonary/Chest: Effort normal and breath sounds normal

## 2019-08-24 NOTE — H&P
Unit/Bed#: E4 -01 Encounter: [id number]    Chief complaint:  Chest pain    History of Present Illness     HPI:  Susie David is a 60 y o  man who was in his usual state of health until this morning  He said when he awakened he was feeling well  He went and got his coffee as usual   He said when he returned he developed a pressure-like sensation in his chest   He thought that it might resolve if he laid down but he felt worse when he did so  He said he went into the kitchen and continued to feel worse  He told his son that he was going to drive to Urgent Care but then reconsidered because of the intensity of his symptoms  His son drove him to urgent care and he was advised to come to the hospital by ambulance  EKG at urgent care was normal   In the ambulance he was given nitroglycerin and his symptoms resolved promptly  On the way out to his car he had an episode of profound diaphoresis  He did not notice that his pain radiated to his neck arms or back  He had no palpitations  He had no syncope  In the emergency room he remained pain-free but was found to have an elevation of his troponin  He is admitted for further evaluation and care of presumed acute coronary syndrome  The patient's past medical history is positive for type 2 diabetes, hypertension, and hyperlipidemia  He has a history of mild intermittent asthma  He was recently diagnosed with essential tremor  He has no past history of cardiac disease, stroke, COPD, peptic ulcer disease, or kidney disease  The patient's medications at the time of admission include: Albuterol 2 puffs every 4 hours as needed  Zetia 10 mg daily  Flonase 2 puffs each nostril daily   Glimepiride 2 mg daily  Losartan 25 mg daily  Metformin 1000 mg twice daily  Niacin 1000 mg daily  Primitive known 100 mg every 12 hours  Rosuvastatin 10 mg daily  Sildenafil 100 mg as needed    The patient is allergic to shellfish which causes throat to swell    He has a history of penicillin allergy which dates back to childhood  He does not know what his reaction was  Family History   Problem Relation Age of Onset    Liver cancer Father     Skin cancer Father     Tremor Mother     Tremor Maternal Grandmother      Social history reveals that the patient smokes approximately a pack of cigarettes per day and has done so for many years  He imbibes ethanol in moderation  He uses no illicit drugs  Allergies   Allergen Reactions    Penicillins Throat Swelling    Shellfish Allergy Throat Swelling       Review of Systems  A detailed 12 point review of systems was conducted and is negative apart from those mentioned in the HPI  Objective   Vitals: Blood pressure 160/70, pulse 70, temperature 97 7 °F (36 5 °C), temperature source Temporal, resp  rate 18, weight 105 kg (232 lb 5 8 oz), SpO2 99 %  Physical Exam   The patient is a well-developed, well-nourished man who is currently in no distress  Head is atraumatic and normocephalic  ENT examination is within normal limits  Eyes revealed the pupils to be equal, round, and reactive to light  Neck extraocular movement were intact  Neck is supple  Carotids without bruits  There is no lymphadenopathy or goiter  Lungs are clear to auscultation and percussion  There is no wheezing, rales, or rhonchi  Cardiac exam reveals regular rhythm  There is no murmur, gallop, or rub  The abdomen is soft with active bowel sounds  There is no mass, tenderness, or organomegaly  Extremities showed no clubbing, cyanosis, or edema  There is no calf tenderness  Peripheral pulses are intact  Neurologic examination reveals the patient to be alert and oriented  There is no focal or lateralizing sign      Lab Results:   Results for orders placed or performed during the hospital encounter of 08/24/19   CBC and differential   Result Value Ref Range    WBC 7 40 4 31 - 10 16 Thousand/uL    RBC 4 27 3 88 - 5 62 Million/uL Hemoglobin 13 5 12 0 - 17 0 g/dL    Hematocrit 40 4 36 5 - 49 3 %    MCV 95 82 - 98 fL    MCH 31 6 26 8 - 34 3 pg    MCHC 33 4 31 4 - 37 4 g/dL    RDW 11 9 11 6 - 15 1 %    MPV 9 9 8 9 - 12 7 fL    Platelets 830 726 - 080 Thousands/uL    nRBC 0 /100 WBCs    Neutrophils Relative 56 43 - 75 %    Immat GRANS % 0 0 - 2 %    Lymphocytes Relative 30 14 - 44 %    Monocytes Relative 8 4 - 12 %    Eosinophils Relative 5 0 - 6 %    Basophils Relative 1 0 - 1 %    Neutrophils Absolute 4 14 1 85 - 7 62 Thousands/µL    Immature Grans Absolute 0 03 0 00 - 0 20 Thousand/uL    Lymphocytes Absolute 2 20 0 60 - 4 47 Thousands/µL    Monocytes Absolute 0 58 0 17 - 1 22 Thousand/µL    Eosinophils Absolute 0 37 0 00 - 0 61 Thousand/µL    Basophils Absolute 0 08 0 00 - 0 10 Thousands/µL   Comprehensive metabolic panel   Result Value Ref Range    Sodium 138 136 - 145 mmol/L    Potassium 5 8 (H) 3 5 - 5 3 mmol/L    Chloride 106 100 - 108 mmol/L    CO2 27 21 - 32 mmol/L    ANION GAP 5 4 - 13 mmol/L    BUN 13 5 - 25 mg/dL    Creatinine 0 76 0 60 - 1 30 mg/dL    Glucose 130 65 - 140 mg/dL    Calcium 8 9 8 3 - 10 1 mg/dL    AST 48 (H) 5 - 45 U/L    ALT 26 12 - 78 U/L    Alkaline Phosphatase 65 46 - 116 U/L    Total Protein 7 6 6 4 - 8 2 g/dL    Albumin 3 4 (L) 3 5 - 5 0 g/dL    Total Bilirubin 0 79 0 20 - 1 00 mg/dL    eGFR 99 ml/min/1 73sq m   Troponin I   Result Value Ref Range    Troponin I 0 39 (H) <=0 04 ng/mL   Potassium   Result Value Ref Range    Potassium 4 3 3 5 - 5 3 mmol/L   Protime-INR   Result Value Ref Range    Protime 13 6 11 6 - 14 5 seconds    INR 1 03 0 84 - 1 19   APTT   Result Value Ref Range    PTT 39 (H) 23 - 37 seconds       Assessment/Plan     Assessment:  1  Chest pain with mildly elevated troponin, strongly suggestive of acute coronary syndrome  2  Type 2 diabetes  3  Hypertension  4  Hypercholesterolemia  5  Smoking  6   Benign essential tremor     Plan:  The patient will be admitted to the hospital and monitored carefully on telemetry  Usual studies to exclude myocardial infarction will be undertaken  Because of his multiple risk factors in the strongly suggestive nature of his symptoms coupled with an abnormal troponin, he will be started on heparin and loaded with Brilinta  He has been started on beta-blockers, aspirin, and high-dose statin therapy  This situation has been discussed with Dr Jose Rogers of Cardiology  He anticipates catheterization in the near future, semi electively  He did say that if the patient had any further symptoms, catheterization would be expedited  In light of the above information, I believe that it is evident that the patient's hospitalization will span 2 or more midnights  He is therefore admitted to the inpatient status  I discussed resuscitative measures with the patient he would like all available modalities employed on his behalf in the event of a cardiac arrest   He is therefore sign to code blue level 1           Code Status: Level 1 - Full Code

## 2019-08-25 LAB
ANION GAP SERPL CALCULATED.3IONS-SCNC: 5 MMOL/L (ref 4–13)
APTT PPP: 59 SECONDS (ref 23–37)
APTT PPP: 62 SECONDS (ref 23–37)
APTT PPP: 66 SECONDS (ref 23–37)
ATRIAL RATE: 71 BPM
ATRIAL RATE: 71 BPM
ATRIAL RATE: 73 BPM
BASOPHILS # BLD AUTO: 0.06 THOUSANDS/ΜL (ref 0–0.1)
BASOPHILS NFR BLD AUTO: 1 % (ref 0–1)
BUN SERPL-MCNC: 13 MG/DL (ref 5–25)
CALCIUM SERPL-MCNC: 9.2 MG/DL (ref 8.3–10.1)
CHLORIDE SERPL-SCNC: 104 MMOL/L (ref 100–108)
CO2 SERPL-SCNC: 28 MMOL/L (ref 21–32)
CREAT SERPL-MCNC: 0.88 MG/DL (ref 0.6–1.3)
EOSINOPHIL # BLD AUTO: 0.45 THOUSAND/ΜL (ref 0–0.61)
EOSINOPHIL NFR BLD AUTO: 4 % (ref 0–6)
ERYTHROCYTE [DISTWIDTH] IN BLOOD BY AUTOMATED COUNT: 11.7 % (ref 11.6–15.1)
GFR SERPL CREATININE-BSD FRML MDRD: 93 ML/MIN/1.73SQ M
GLUCOSE SERPL-MCNC: 120 MG/DL (ref 65–140)
GLUCOSE SERPL-MCNC: 129 MG/DL (ref 65–140)
GLUCOSE SERPL-MCNC: 158 MG/DL (ref 65–140)
GLUCOSE SERPL-MCNC: 198 MG/DL (ref 65–140)
GLUCOSE SERPL-MCNC: 68 MG/DL (ref 65–140)
HCT VFR BLD AUTO: 39.7 % (ref 36.5–49.3)
HCV AB SER QL: NORMAL
HGB BLD-MCNC: 13.4 G/DL (ref 12–17)
IMM GRANULOCYTES # BLD AUTO: 0.05 THOUSAND/UL (ref 0–0.2)
IMM GRANULOCYTES NFR BLD AUTO: 1 % (ref 0–2)
LYMPHOCYTES # BLD AUTO: 2.7 THOUSANDS/ΜL (ref 0.6–4.47)
LYMPHOCYTES NFR BLD AUTO: 25 % (ref 14–44)
MCH RBC QN AUTO: 31.6 PG (ref 26.8–34.3)
MCHC RBC AUTO-ENTMCNC: 33.8 G/DL (ref 31.4–37.4)
MCV RBC AUTO: 94 FL (ref 82–98)
MONOCYTES # BLD AUTO: 0.64 THOUSAND/ΜL (ref 0.17–1.22)
MONOCYTES NFR BLD AUTO: 6 % (ref 4–12)
NEUTROPHILS # BLD AUTO: 6.88 THOUSANDS/ΜL (ref 1.85–7.62)
NEUTS SEG NFR BLD AUTO: 63 % (ref 43–75)
NRBC BLD AUTO-RTO: 0 /100 WBCS
P AXIS: 70 DEGREES
P AXIS: 70 DEGREES
P AXIS: 71 DEGREES
PLATELET # BLD AUTO: 191 THOUSANDS/UL (ref 149–390)
PMV BLD AUTO: 9.7 FL (ref 8.9–12.7)
POTASSIUM SERPL-SCNC: 3.8 MMOL/L (ref 3.5–5.3)
PR INTERVAL: 152 MS
PR INTERVAL: 152 MS
PR INTERVAL: 166 MS
QRS AXIS: 13 DEGREES
QRS AXIS: 16 DEGREES
QRS AXIS: 16 DEGREES
QRSD INTERVAL: 92 MS
QRSD INTERVAL: 98 MS
QRSD INTERVAL: 98 MS
QT INTERVAL: 374 MS
QT INTERVAL: 388 MS
QT INTERVAL: 388 MS
QTC INTERVAL: 412 MS
QTC INTERVAL: 421 MS
QTC INTERVAL: 421 MS
RBC # BLD AUTO: 4.24 MILLION/UL (ref 3.88–5.62)
SODIUM SERPL-SCNC: 137 MMOL/L (ref 136–145)
T WAVE AXIS: 46 DEGREES
T WAVE AXIS: 46 DEGREES
T WAVE AXIS: 55 DEGREES
TROPONIN I SERPL-MCNC: 1.53 NG/ML
VENTRICULAR RATE: 71 BPM
VENTRICULAR RATE: 71 BPM
VENTRICULAR RATE: 73 BPM
WBC # BLD AUTO: 10.78 THOUSAND/UL (ref 4.31–10.16)

## 2019-08-25 PROCEDURE — 99232 SBSQ HOSP IP/OBS MODERATE 35: CPT | Performed by: INTERNAL MEDICINE

## 2019-08-25 PROCEDURE — 02703ZZ DILATION OF CORONARY ARTERY, ONE ARTERY, PERCUTANEOUS APPROACH: ICD-10-PCS | Performed by: INTERNAL MEDICINE

## 2019-08-25 PROCEDURE — 80048 BASIC METABOLIC PNL TOTAL CA: CPT | Performed by: INTERNAL MEDICINE

## 2019-08-25 PROCEDURE — 4A023N7 MEASUREMENT OF CARDIAC SAMPLING AND PRESSURE, LEFT HEART, PERCUTANEOUS APPROACH: ICD-10-PCS | Performed by: INTERNAL MEDICINE

## 2019-08-25 PROCEDURE — 93010 ELECTROCARDIOGRAM REPORT: CPT | Performed by: INTERNAL MEDICINE

## 2019-08-25 PROCEDURE — 027035Z DILATION OF CORONARY ARTERY, ONE ARTERY WITH TWO DRUG-ELUTING INTRALUMINAL DEVICES, PERCUTANEOUS APPROACH: ICD-10-PCS | Performed by: INTERNAL MEDICINE

## 2019-08-25 PROCEDURE — 82948 REAGENT STRIP/BLOOD GLUCOSE: CPT

## 2019-08-25 PROCEDURE — 99254 IP/OBS CNSLTJ NEW/EST MOD 60: CPT | Performed by: INTERNAL MEDICINE

## 2019-08-25 PROCEDURE — 85025 COMPLETE CBC W/AUTO DIFF WBC: CPT | Performed by: INTERNAL MEDICINE

## 2019-08-25 PROCEDURE — 84484 ASSAY OF TROPONIN QUANT: CPT | Performed by: INTERNAL MEDICINE

## 2019-08-25 PROCEDURE — 85730 THROMBOPLASTIN TIME PARTIAL: CPT | Performed by: INTERNAL MEDICINE

## 2019-08-25 RX ORDER — NITROGLYCERIN 0.4 MG/1
0.4 TABLET SUBLINGUAL
Status: DISCONTINUED | OUTPATIENT
Start: 2019-08-25 | End: 2019-08-28 | Stop reason: HOSPADM

## 2019-08-25 RX ORDER — LORAZEPAM 2 MG/ML
0.5 INJECTION INTRAMUSCULAR EVERY 6 HOURS PRN
Status: DISCONTINUED | OUTPATIENT
Start: 2019-08-25 | End: 2019-08-28 | Stop reason: HOSPADM

## 2019-08-25 RX ORDER — DIPHENHYDRAMINE HCL 25 MG
50 TABLET ORAL
Status: DISCONTINUED | OUTPATIENT
Start: 2019-08-25 | End: 2019-08-28 | Stop reason: HOSPADM

## 2019-08-25 RX ORDER — AMLODIPINE BESYLATE 5 MG/1
5 TABLET ORAL DAILY
Status: DISCONTINUED | OUTPATIENT
Start: 2019-08-25 | End: 2019-08-28 | Stop reason: HOSPADM

## 2019-08-25 RX ORDER — METHYLPREDNISOLONE 16 MG/1
32 TABLET ORAL
Status: COMPLETED | OUTPATIENT
Start: 2019-08-25 | End: 2019-08-26

## 2019-08-25 RX ORDER — CLOTRIMAZOLE 1 %
CREAM (GRAM) TOPICAL 2 TIMES DAILY
Status: DISPENSED | OUTPATIENT
Start: 2019-08-25 | End: 2019-08-27

## 2019-08-25 RX ORDER — CLOTRIMAZOLE 1 %
CREAM (GRAM) TOPICAL 2 TIMES DAILY
Status: DISCONTINUED | OUTPATIENT
Start: 2019-08-25 | End: 2019-08-25

## 2019-08-25 RX ORDER — LOSARTAN POTASSIUM 50 MG/1
50 TABLET ORAL DAILY
Status: DISCONTINUED | OUTPATIENT
Start: 2019-08-25 | End: 2019-08-28 | Stop reason: HOSPADM

## 2019-08-25 RX ADMIN — INSULIN LISPRO 1 UNITS: 100 INJECTION, SOLUTION INTRAVENOUS; SUBCUTANEOUS at 08:37

## 2019-08-25 RX ADMIN — PRIMIDONE 100 MG: 50 TABLET ORAL at 08:36

## 2019-08-25 RX ADMIN — LORAZEPAM 0.5 MG: 2 INJECTION INTRAMUSCULAR; INTRAVENOUS at 22:07

## 2019-08-25 RX ADMIN — INSULIN LISPRO 1 UNITS: 100 INJECTION, SOLUTION INTRAVENOUS; SUBCUTANEOUS at 12:30

## 2019-08-25 RX ADMIN — EZETIMIBE 10 MG: 10 TABLET ORAL at 08:36

## 2019-08-25 RX ADMIN — METOPROLOL TARTRATE 37.5 MG: 25 TABLET ORAL at 21:55

## 2019-08-25 RX ADMIN — TICAGRELOR 90 MG: 90 TABLET ORAL at 08:36

## 2019-08-25 RX ADMIN — METOPROLOL TARTRATE 25 MG: 25 TABLET, FILM COATED ORAL at 08:36

## 2019-08-25 RX ADMIN — METHYLPREDNISOLONE 32 MG: 16 TABLET ORAL at 21:58

## 2019-08-25 RX ADMIN — ATORVASTATIN CALCIUM 80 MG: 80 TABLET, FILM COATED ORAL at 17:13

## 2019-08-25 RX ADMIN — METOPROLOL TARTRATE 12.5 MG: 25 TABLET ORAL at 11:10

## 2019-08-25 RX ADMIN — ASPIRIN 81 MG: 81 TABLET, COATED ORAL at 08:36

## 2019-08-25 RX ADMIN — PRIMIDONE 100 MG: 50 TABLET ORAL at 21:56

## 2019-08-25 RX ADMIN — AMLODIPINE BESYLATE 5 MG: 5 TABLET ORAL at 11:10

## 2019-08-25 RX ADMIN — TICAGRELOR 90 MG: 90 TABLET ORAL at 21:56

## 2019-08-25 RX ADMIN — HEPARIN SODIUM AND DEXTROSE 15.1 UNITS/KG/HR: 10000; 5 INJECTION INTRAVENOUS at 11:11

## 2019-08-25 RX ADMIN — CLOTRIMAZOLE: 10 CREAM TOPICAL at 21:55

## 2019-08-25 RX ADMIN — NICOTINE 1 PATCH: 21 PATCH TRANSDERMAL at 08:37

## 2019-08-25 RX ADMIN — HEPARIN SODIUM 2000 UNITS: 1000 INJECTION, SOLUTION INTRAVENOUS; SUBCUTANEOUS at 03:36

## 2019-08-25 RX ADMIN — LOSARTAN POTASSIUM 50 MG: 50 TABLET ORAL at 08:36

## 2019-08-25 NOTE — PLAN OF CARE
Problem: Potential for Falls  Goal: Patient will remain free of falls  Description  INTERVENTIONS:  - Assess patient frequently for physical needs  -  Identify cognitive and physical deficits and behaviors that affect risk of falls  -  [place] fall precautions as indicated by assessment   - Educate patient/family on patient safety including physical limitations  - Instruct patient to call for assistance with activity based on assessment  - Modify environment to reduce risk of injury  - Consider OT/PT consult to assist with strengthening/mobility  Outcome: Progressing     Problem: DISCHARGE PLANNING  Goal: Discharge to home or other facility with appropriate resources  Description  INTERVENTIONS:  - Identify barriers to discharge w/patient and caregiver  - Arrange for needed discharge resources and transportation as appropriate  - Identify discharge learning needs (meds, wound care, etc )  - Arrange for interpretive services to assist at discharge as needed  - Refer to Case Management Department for coordinating discharge planning if the patient needs post-hospital services based on physician/advanced practitioner order or complex needs related to functional status, cognitive ability, or social support system  Outcome: Progressing     Problem: Knowledge Deficit  Goal: Patient/family/caregiver demonstrates understanding of disease process, treatment plan, medications, and discharge instructions  Description  Complete learning assessment and assess knowledge base    Interventions:  - Provide teaching at level of understanding  - Provide teaching via preferred learning methods  Outcome: Progressing     Problem: PAIN - ADULT  Goal: Verbalizes/displays adequate comfort level or baseline comfort level  Description  Interventions:  - Encourage patient to monitor pain and request assistance  - Assess pain using appropriate pain scale  - Administer analgesics based on type and severity of pain and evaluate response  - Implement non-pharmacological measures as appropriate and evaluate response  - Consider cultural and social influences on pain and pain management  - Notify physician/advanced practitioner if interventions unsuccessful or patient reports new pain  Outcome: Progressing     Problem: INFECTION - ADULT  Goal: Absence or prevention of progression during hospitalization  Description  INTERVENTIONS:  - Assess and monitor for signs and symptoms of infection  - Monitor lab/diagnostic results  - Monitor all insertion sites, i e  indwelling lines, tubes, and drains  - Monitor endotracheal if appropriate and nasal secretions for changes in amount and color  - [place] appropriate cooling/warming therapies per order  - Administer medications as ordered  - Instruct and encourage patient and family to use good hand hygiene technique  - Identify and instruct in appropriate isolation precautions for identified infection/condition  Outcome: Progressing  Goal: Absence of fever/infection during neutropenic period  Description  INTERVENTIONS:  - Monitor WBC    Outcome: Progressing     Problem: SAFETY ADULT  Goal: Maintain or return to baseline ADL function  Description  INTERVENTIONS:  -  Assess patient's ability to carry out ADLs; assess patient's baseline for ADL function and identify physical deficits which impact ability to perform ADLs (bathing, care of mouth/teeth, toileting, grooming, dressing, etc )  - Assess/evaluate cause of self-care deficits   - Assess range of motion  - Assess patient's mobility; develop plan if impaired  - Assess patient's need for assistive devices and provide as appropriate  - Encourage maximum independence but intervene and supervise when necessary  - Involve family in performance of ADLs  - Assess for home care needs following discharge   - Consider OT consult to assist with ADL evaluation and planning for discharge  - Provide patient education as appropriate  Outcome: Progressing  Goal: Maintain or return mobility status to optimal level  Description  INTERVENTIONS:  - Assess patient's baseline mobility status (ambulation, transfers, stairs, etc )    - Identify cognitive and physical deficits and behaviors that affect mobility  - Identify mobility aids required to assist with transfers and/or ambulation (gait belt, sit-to-stand, lift, walker, cane, etc )  - [place] fall precautions as indicated by assessment  - Record patient progress and toleration of activity level on Mobility SBAR; progress patient to next Phase/Stage  - Instruct patient to call for assistance with activity based on assessment  - Consider rehabilitation consult to assist with strengthening/weightbearing, etc   Outcome: Progressing     Problem: CARDIOVASCULAR - ADULT  Goal: Maintains optimal cardiac output and hemodynamic stability  Description  INTERVENTIONS:  - Monitor I/O, vital signs and rhythm  - Monitor for S/S and trends of decreased cardiac output  - Administer and titrate ordered vasoactive medications to optimize hemodynamic stability  - Assess quality of pulses, skin color and temperature  - Assess for signs of decreased coronary artery perfusion  - Instruct patient to report change in severity of symptoms  Outcome: Progressing  Goal: Absence of cardiac dysrhythmias or at baseline rhythm  Description  INTERVENTIONS:  - Continuous cardiac monitoring, vital signs, obtain 12 lead EKG if ordered  - Administer antiarrhythmic and heart rate control medications as ordered  - Monitor electrolytes and administer replacement therapy as ordered  Outcome: Progressing

## 2019-08-25 NOTE — PROGRESS NOTES
Progress Note - Susie David 60 y o  male MRN: [id number]    Unit/Bed#: E4 -01 Encounter: [id number]      Subjective: The patient is feeling pretty well  He has had no further chest pain  He denies shortness of breath or palpitations  He denies nausea or vomiting  He slept reasonably well  Physical Exam:   Temp:  [97 5 °F (36 4 °C)-98 2 °F (36 8 °C)] 98 °F (36 7 °C)  HR:  [66-76] 74  Resp:  [18-20] 20  BP: (152-184)/(70-96) 157/71    Gen:  Well-developed, well-nourished, in no distress  Neck:  Supple  No lymphadenopathy, goiter, or bruit  Heart:  Regular rhythm  No murmur, gallop, or rub  Lungs:  Clear to auscultation and percussion  No wheezing, rales, or rhonchi    Abd:  Soft with active bowel sounds  No mass, tenderness, or organomegaly  Extremities:  No clubbing, cyanosis, or edema  No calf tenderness  Neuro:  Alert and oriented    No focal sign  Skin:  Warm and dry      LABS:   CBC:   Lab Results   Component Value Date    WBC 10 78 (H) 08/25/2019    HGB 13 4 08/25/2019    HCT 39 7 08/25/2019    MCV 94 08/25/2019     08/25/2019    MCH 31 6 08/25/2019    MCHC 33 8 08/25/2019    RDW 11 7 08/25/2019    MPV 9 7 08/25/2019    NRBC 0 08/25/2019   , CMP:   Lab Results   Component Value Date    SODIUM 137 08/25/2019    K 3 8 08/25/2019     08/25/2019    CO2 28 08/25/2019    BUN 13 08/25/2019    CREATININE 0 88 08/25/2019    CALCIUM 9 2 08/25/2019    AST 48 (H) 08/24/2019    ALT 26 08/24/2019    ALKPHOS 65 08/24/2019    EGFR 93 08/25/2019   , Troponin:   Lab Results   Component Value Date    TROPONINI 1 53 (H) 08/25/2019           Patient Active Problem List   Diagnosis    Erectile dysfunction    HTN (hypertension)    Mixed hyperlipidemia    Type 2 diabetes mellitus (HCC)    Tobacco abuse    Bronchitis    Acute non-recurrent frontal sinusitis    Acute non-recurrent maxillary sinusitis    Pharyngitis due to Streptococcus species    Tremor of both hands    NSTEMI (non-ST elevated myocardial infarction) ([facility] [address] )    Essential tremor       Assessment/Plan:  1  Non ST elevation MI type 1  2  Hypertension  3  Type 2 diabetes  4  Dyslipidemia  5  Essential tremor  6  Shellfish allergy    I discussed the situation in detail with the patient and with Dr Nayana Desai  Catheterization has been recommended  The patient is agreeable to this  Steroid prep has been instituted because the patient has a shellfish allergy  His blood pressure was high  Losartan was increased and amlodipine was added  The patient's diabetic control is satisfactory        VTE Pharmacologic Prophylaxis: Heparin  VTE Mechanical Prophylaxis: reason for no mechanical VTE prophylaxis Therapeutically anticoagulated

## 2019-08-25 NOTE — UTILIZATION REVIEW
Notification of Inpatient Admission/Inpatient Authorization Request  This is a Notification of Inpatient Admission/Request for Inpatient Authorization for our facility [facility]  Be advised that this patient was admitted to our facility under Inpatient Status  Please contact the Utilization Review Department where the patient is receiving care services for additional admission information  Place of Service Code: 24   Place of Service Name: Inpatient Hospital  Presentation Date & Time: 8/24/2019 11:36 AM  Inpatient Admission Date & Time: 8/24/19 1355  Discharge Date & Time: No discharge date for patient encounter  Discharge Disposition (if discharged): Final discharge disposition not confirmed  Attending Physician: Yaniv Cortes, [address] [[id number]]  Admission Orders (From admission, onward)     Ordered        08/24/19 1355  Inpatient Admission  Once                   Facility: [facility] Utilization Review Department  Phone: [phone]; Fax [phone]  [email or web address]  ATTENTION: Please call with any questions or concerns to [phone]  and carefully listen to the prompts so that you are directed to the right person  Send all requests for admission clinical reviews, approved or denied determinations and any other requests to fax [phone]   All voicemails are confidential

## 2019-08-25 NOTE — UTILIZATION REVIEW
Initial Clinical Review    Admission: Date/Time/Statement: Inpatient Admission Orders (From admission, onward)     Ordered        08/24/19 1355  Inpatient Admission  Once                   Orders Placed This Encounter   Procedures    Inpatient Admission     Standing Status:   Standing     Number of Occurrences:   1     Order Specific Question:   Admitting Physician     Answer:   SEAN CHARLTON [[id number]]     Order Specific Question:   Level of Care     Answer:   Med Surg [[id number]]     Order Specific Question:   Estimated length of stay     Answer:   More than 2 Midnights     Order Specific Question:   Certification     Answer:   I certify that inpatient services are medically necessary for this patient for a duration of greater than two midnights  See H&P and MD Progress Notes for additional information about the patient's course of treatment  ED Arrival Information     Expected Arrival Acuity Means of Arrival Escorted By Service Admission Type    8/24/2019 8/24/2019 11:36 Urgent Ambulance [facility] General Medicine Urgent    Arrival Complaint    chest pain        Chief Complaint   Patient presents with    Chest Pain     States this AM with chest pain, nausea, sweating, and numbness in fingers took aspirin without relief seen urgent care recived another 324 mg aspirin without relief was given 1 nitro in ambulance instant relife, still little numb in fingers all other symptoms resolved     Assessment/Plan: 61 yo male to ED by ems admitted as Inpatient d/t NSTEMI chest pain with mildly elevated troponin, strongly suggestive of acute coronary syndrome  Tele,  beta-blockers, aspirin, and high-dose statin,  cardiac cath  Cardiology consult  8/25 Cardiology consult:Non-STEMI with risk factors including diabetes, hypertension, dyslipidemia, and tobacco Continue beta-blocker-will increase  Continue aspirin/heparin/statin/Brilinta/Norvasc echocardiogram     ED Triage Vitals [08/24/19 1143]   Temperature Pulse Respirations Blood Pressure SpO2   97 8 °F (36 6 °C) 75 18 (!) 184/91 98 %      Temp Source Heart Rate Source Patient Position - Orthostatic VS BP Location FiO2 (%)   Oral Monitor Sitting Right arm --      Pain Score       No Pain        Wt Readings from Last 1 Encounters:   08/24/19 105 kg (232 lb 5 8 oz)     Additional Vital Signs:   08/25/19 1128  98 2 °F (36 8 °C)  73  20  158/81  96 %  None (Room air)  Sitting   08/25/19 0704  98 °F (36 7 °C)  74  20  157/71  96 %  None (Room air)  Lying   08/24/19 2300  97 5 °F (36 4 °C)  73  18  154/96  97 %  None (Room air)  Lying   08/24/19 1906  98 2 °F (36 8 °C)  76  18  164/89  97 %  None (Room air)  Lying   08/24/19 1454  97 7 °F (36 5 °C)  70  18  160/70  99 %  None (Room air)  Lying   08/24/19 1430    68  18  163/88  98 %    Lying   08/24/19 1400    66  20  152/81         08/24/19 1215    70  19  164/81  98 %    Sitting   08/24/19 1143  97 8 °F (36 6 °C)  75  18  184/91Abnormal   98 %  None (Room air)  Sitting       Pertinent Labs/Diagnostic Test Results:   Results from last 7 days   Lab Units 08/25/19  0307 08/24/19  1147   WBC Thousand/uL 10 78* 7 40   HEMOGLOBIN g/dL 13 4 13 5   HEMATOCRIT % 39 7 40 4   PLATELETS Thousands/uL 191 195   NEUTROS ABS Thousands/µL 6 88 4 14         Results from last 7 days   Lab Units 08/25/19  0306 08/24/19  1307 08/24/19  1147   SODIUM mmol/L 137  --  138   POTASSIUM mmol/L 3 8 4 3 5 8*   CHLORIDE mmol/L 104  --  106   CO2 mmol/L 28  --  27   ANION GAP mmol/L 5  --  5   BUN mg/dL 13  --  13   CREATININE mg/dL 0 88  --  0 76   EGFR ml/min/1 73sq m 93  --  99   CALCIUM mg/dL 9 2  --  8 9     Results from last 7 days   Lab Units 08/24/19  1147   AST U/L 48*   ALT U/L 26   ALK PHOS U/L 65   TOTAL PROTEIN g/dL 7 6   ALBUMIN g/dL 3 4*   TOTAL BILIRUBIN mg/dL 0 79     Results from last 7 days   Lab Units 08/25/19  1127 08/25/19  0703 08/24/19  2109 08/24/19  1601   POC GLUCOSE mg/dl 198* 158* 189* 99     Results from last 7 days Lab Units 08/25/19  0306 08/24/19  1147   GLUCOSE RANDOM mg/dL 129 130         Results from last 7 days   Lab Units 08/24/19  1602   HEMOGLOBIN A1C % 6 4*   EAG mg/dl 137       Results from last 7 days   Lab Units 08/25/19  0307 08/24/19  1946 08/24/19  1602 08/24/19  1147   TROPONIN I ng/mL 1 53* 1 22* 0 78* 0 39*         Results from last 7 days   Lab Units 08/25/19  1015 08/25/19  0309 08/24/19  1946 08/24/19  1307   PROTIME seconds  --   --   --  13 6   INR   --   --   --  1 03   PTT seconds 66* 59* 52* 39*       Results from last 7 days   Lab Units 08/24/19  1602   HEP C AB  Non-reactive     8/24 CXR:No acute cardiopulmonary disease      8/25 echo: pending    8/25 cardiac cath: pending    ED Treatment:   Medication Administration from 08/24/2019 1102 to 08/24/2019 1442       Date/Time Order Dose Route Action       08/24/2019 1346 heparin (porcine) injection 4,000 Units 4,000 Units Intravenous Given       08/24/2019 1348 heparin (porcine) 25,000 units in 250 mL infusion (premix) 11 1 Units/kg/hr Intravenous New Bag       08/24/2019 1301 metoprolol tartrate (LOPRESSOR) tablet 25 mg 25 mg Oral Given       08/24/2019 1301 atorvastatin (LIPITOR) tablet 80 mg 80 mg Oral Given          Past Medical History:   Diagnosis Date    Asthma     Diabetes ([place] [address] )     Hypercholesteremia      Present on Admission:   Type 2 diabetes mellitus ([place] [address] )   Tobacco abuse   Mixed hyperlipidemia   HTN (hypertension)   Essential tremor      Admitting Diagnosis: Chest pain [R07 9]  NSTEMI (non-ST elevated myocardial infarction) ([place] [address] ) [I21 4]  Age/Sex: 60 y o  male  Admission Orders:    Current Facility-Administered Medications:  acetaminophen 650 mg Oral Q4H PRN     albuterol 2 puff Inhalation Q4H PRN     amLODIPine 5 mg Oral Daily     aspirin 81 mg Oral Daily     atorvastatin 80 mg Oral Daily With Dinner     diphenhydrAMINE 50 mg Oral 60 Min Pre-Op     ezetimibe 10 mg Oral Daily     fluticasone 2 spray Nasal Daily PRN     heparin (porcine) 3-20 Units/kg/hr (Order-Specific) Intravenous Titrated     heparin (porcine) 2,000 Units Intravenous PRN 8/24 x 1, 8/25 x 1    heparin (porcine) 4,000 Units Intravenous PRN     insulin lispro 1-6 Units Subcutaneous TID AC     losartan 50 mg Oral Daily     methylPREDNISolone 32 mg Oral Q10H     metoprolol tartrate 37 5 mg Oral Q12H [address]     nicotine 1 patch Transdermal Daily     nitroglycerin 0 4 mg Sublingual Q5 Min PRN     primidone 100 mg Oral Q12H [address]     ticagrelor 90 mg Oral Q12H [address]       Npo  Cardiac cath  Cons carb diet  oob  Tele  IP CONSULT TO CARDIOLOGY    Network Utilization Review Department  Phone: [phone]; Fax [phone]  [email or web address]  ATTENTION: Please call with any questions or concerns to [phone]  and carefully listen to the prompts so that you are directed to the right person  Send all requests for admission clinical reviews, approved or denied determinations and any other requests to fax [phone]   All voicemails are confidential

## 2019-08-25 NOTE — PLAN OF CARE
Problem: Potential for Falls  Goal: Patient will remain free of falls  Description  INTERVENTIONS:  - Assess patient frequently for physical needs  -  Identify cognitive and physical deficits and behaviors that affect risk of falls  -  [place] fall precautions as indicated by assessment   - Educate patient/family on patient safety including physical limitations  - Instruct patient to call for assistance with activity based on assessment  - Modify environment to reduce risk of injury  - Consider OT/PT consult to assist with strengthening/mobility  Outcome: Progressing     Problem: DISCHARGE PLANNING  Goal: Discharge to home or other facility with appropriate resources  Description  INTERVENTIONS:  - Identify barriers to discharge w/patient and caregiver  - Arrange for needed discharge resources and transportation as appropriate  - Identify discharge learning needs (meds, wound care, etc )  - Arrange for interpretive services to assist at discharge as needed  - Refer to Case Management Department for coordinating discharge planning if the patient needs post-hospital services based on physician/advanced practitioner order or complex needs related to functional status, cognitive ability, or social support system  Outcome: Progressing     Problem: Knowledge Deficit  Goal: Patient/family/caregiver demonstrates understanding of disease process, treatment plan, medications, and discharge instructions  Description  Complete learning assessment and assess knowledge base    Interventions:  - Provide teaching at level of understanding  - Provide teaching via preferred learning methods  Outcome: Progressing

## 2019-08-25 NOTE — CONSULTS
Cardiology Consult  Nestor Huynh  [phone]    08/25/19    Referring Physian: MD MIGUEL Sue    Chief Complain/Reason for Referal:     IMPRESSION:    1  Non-STEMI with risk factors including diabetes, hypertension, dyslipidemia, and tobacco  2  Shellfish allergy  3  Benign essential tremor    DISCUSSION/RECOMMENDATIONS:    Continue beta-blocker-will increase  Continue aspirin/heparin/statin/Brilinta  Will add Norvasc  Check echocardiogram     Dye prep for shellfish allergy         ======================================================    HPI:  I am seeing this patient in cardiology consultation for:  Mel Stoddard is a 60 y o  man with past medical history of diabetes, hypertension, tobacco use, dyslipidemia presents with chief complaint of substernal burning chest pain that is associated with diaphoresis and nausea  The pain was relieved with nitroglycerin x1  Troponin is elevated at 1 53           Past Medical History:   Diagnosis Date    Asthma     Diabetes ([facility] [address] )     Hypercholesteremia          Scheduled Meds:  Current Facility-Administered Medications:  acetaminophen 650 mg Oral Q4H PRN Nestor Leach MD    albuterol 2 puff Inhalation Q4H PRN Nestor Leach MD    aspirin 81 mg Oral Daily Sharon Ramachandran MD    atorvastatin 80 mg Oral Daily With [employer], DO    ezetimibe 10 mg Oral Daily Nestor Leach MD    fluticasone 2 spray Nasal Daily PRN Nestor Leach MD    heparin (porcine) 3-20 Units/kg/hr (Order-Specific) Intravenous Titrated Alexis Connor, DO Last Rate: 15 1 Units/kg/hr (08/25/19 0340)   heparin (porcine) 2,000 Units Intravenous PRN Alexis Connor, DO    heparin (porcine) 4,000 Units Intravenous PRN Alexis Ryan, DO    insulin lispro 1-6 Units Subcutaneous TID AC Nestor Leach MD    losartan 50 mg Oral Daily Nestor Leach MD    metoprolol tartrate 25 mg Oral Q12H Walkerhacelena, DO    nicotine 1 patch Transdermal Daily Dylan Irizarry MD Amrik    nitroglycerin 0 4 mg Sublingual Q5 Min PRN Mil Scruggs MD    primidone 100 mg Oral Q12H [facility] Mil Scruggs MD    ticagrelor 90 mg Oral Q12H [facility] Nelida Ghosh MD      Continuous Infusions:  heparin (porcine) 3-20 Units/kg/hr (Order-Specific) Last Rate: 15 1 Units/kg/hr (08/25/19 0340)     PRN Meds:   acetaminophen    albuterol    fluticasone    heparin (porcine)    heparin (porcine)    nitroglycerin  Allergies   Allergen Reactions    Penicillins Throat Swelling    Shellfish Allergy Throat Swelling     I reviewed the Home Medication list in the chart       Family History   Problem Relation Age of Onset    Liver cancer Father     Skin cancer Father     Tremor Mother     Tremor Maternal Grandmother        Social History     Socioeconomic History    Marital status: /Civil Union     Spouse name: Not on file    Number of children: Not on file    Years of education: Not on file    Highest education level: Not on file   Occupational History    Not on file   Social Needs    Financial resource strain: Not on file    Food insecurity:     Worry: Not on file     Inability: Not on file    Transportation needs:     Medical: Not on file     Non-medical: Not on file   Tobacco Use    Smoking status: Current Some Day Smoker     Packs/day: 1 00     Years: 40 00     Pack years: 40 00     Types: Cigarettes    Smokeless tobacco: Never Used    Tobacco comment: he has quit several times before   Substance and Sexual Activity    Alcohol use: Never     Frequency: Never    Drug use: Yes     Types: Marijuana    Sexual activity: Not Currently   Lifestyle    Physical activity:     Days per week: Not on file     Minutes per session: Not on file    Stress: Not on file   Relationships    Social connections:     Talks on phone: Not on file     Gets together: Not on file     Attends Yarsani service: Not on file     Active member of club or organization: Not on file     Attends meetings of clubs or organizations: Not on file     Relationship status: Not on file    Intimate partner violence:     Fear of current or ex partner: Not on file     Emotionally abused: Not on file     Physically abused: Not on file     Forced sexual activity: Not on file   Other Topics Concern    Not on file   Social History Narrative    Not on file       Review of Systems - as per HPI, all others reviewed and negative  Vitals:    08/25/19 0704   BP: 157/71   Pulse: 74   Resp: 20   Temp: 98 °F (36 7 °C)   SpO2: 96%     I/O       08/23 0701 - 08/24 0700 08/24 0701 - 08/25 0700 08/25 0701 - 08/26 0700    P  O   240     I V  (mL/kg)  189 8 (1 8)     Total Intake(mL/kg)  429 8 (4 1)     Net  +429 8            Unmeasured Urine Occurrence  3 x         Weight (last 2 days)     Date/Time   Weight    08/24/19 1143   105 (232 37)              GEN: NAD, Alert  HEENT: Mucus membranes moist, pink conjunctivae  EYES: Pupils equal, sclera anicteric  NECK: No JVD/HJR, no carotid bruit  CARDIOVASCULAR: RRR, No murmur, rub, gallops S1,S2, no parasternal heave/thrill  LUNGS: Clear To auscultation bilaterally  ABDOMEN: Soft, nondistended, no hepatic systolic pulsation  EXTREMITIES/VASCULAR: No edema  PSYCH: Normal Affect by limited examination  NEURO: Grossly intact by limited examination    HEME: No significant bleeding, bruising, petechia by limited examination  SKIN: No significant rashes by limited examination  MSK:  Normal upper extremity and trunk strengths by limited examination      EKG:  Originally normal sinus rhythm with normal tracing    Subsequent ECG shows normal sinus rhythm with anterolateral T-wave inversions/biphasic T waves  TELE:  No events  CXR:  No acute cardiopulmonary disease  ECHO:  Pending    Results from last 7 days   Lab Units 08/25/19  0307 08/24/19  1147   WBC Thousand/uL 10 78* 7 40   HEMOGLOBIN g/dL 13 4 13 5   HEMATOCRIT % 39 7 40 4   PLATELETS Thousands/uL 191 195   NEUTROS PCT % 63 56   MONOS PCT % 6 8     Results from last 7 days   Lab Units 08/25/19  0306 08/24/19  1307 08/24/19  1147   POTASSIUM mmol/L 3 8 4 3 5 8*   CHLORIDE mmol/L 104  --  106   CO2 mmol/L 28  --  27   BUN mg/dL 13  --  13   CREATININE mg/dL 0 88  --  0 76   CALCIUM mg/dL 9 2  --  8 9     Results from last 7 days   Lab Units 08/25/19  0306 08/24/19  1307 08/24/19  1147   POTASSIUM mmol/L 3 8 4 3 5 8*   CHLORIDE mmol/L 104  --  106   CO2 mmol/L 28  --  27   BUN mg/dL 13  --  13   CREATININE mg/dL 0 88  --  0 76   CALCIUM mg/dL 9 2  --  8 9   ALK PHOS U/L  --   --  65   ALT U/L  --   --  26   AST U/L  --   --  48*     No results found for: TROPONINT      Results from last 7 days   Lab Units 08/25/19  0307   TROPONIN I ng/mL 1 53*         Results from last 7 days   Lab Units 08/24/19  1307   INR  1 03               I have personally reviewed the EKG, CXR and Telemetry images directly  Patient Active Problem List    Diagnosis Date Noted    [facility] 08/24/2019     Priority: Low    Essential tremor 08/24/2019     Priority: Low    Tremor of both hands 05/14/2019     Priority: Low    Pharyngitis due to Streptococcus species 01/29/2019     Priority: Low    Acute non-recurrent maxillary sinusitis 11/30/2018     Priority: Low    Acute non-recurrent frontal sinusitis 04/25/2018     Priority: Low    Bronchitis 04/04/2018     Priority: Low    HTN (hypertension) 03/17/2017     Priority: Low    Tobacco abuse 06/28/2016     Priority: Low    Erectile dysfunction 03/03/2016     Priority: Low    Type 2 diabetes mellitus ([facility] [address] ) 01/02/2013     Priority: Low    Mixed hyperlipidemia 11/02/2010     Priority: Low       Portions of the record may have been created with voice recognition software  Occasional wrong word or "sound a like" substitutions may have occurred due to the inherent limitations of voice recognition software   Read the chart carefully and recognize, using context, where substitutions have occurred        Kaycee Campbell MD  8/25/2019 8:52 AM

## 2019-08-26 ENCOUNTER — APPOINTMENT (INPATIENT)
Dept: NON INVASIVE DIAGNOSTICS | Facility: HOSPITAL | Age: 59
DRG: 247 | End: 2019-08-26
Attending: INTERNAL MEDICINE
Payer: COMMERCIAL

## 2019-08-26 ENCOUNTER — APPOINTMENT (INPATIENT)
Dept: NON INVASIVE DIAGNOSTICS | Facility: HOSPITAL | Age: 59
DRG: 247 | End: 2019-08-26
Payer: COMMERCIAL

## 2019-08-26 LAB
ANION GAP SERPL CALCULATED.3IONS-SCNC: 8 MMOL/L (ref 4–13)
APTT PPP: 67 SECONDS (ref 23–37)
BASOPHILS # BLD AUTO: 0.03 THOUSANDS/ΜL (ref 0–0.1)
BASOPHILS NFR BLD AUTO: 0 % (ref 0–1)
BUN SERPL-MCNC: 13 MG/DL (ref 5–25)
CALCIUM SERPL-MCNC: 9.5 MG/DL (ref 8.3–10.1)
CHLORIDE SERPL-SCNC: 101 MMOL/L (ref 100–108)
CO2 SERPL-SCNC: 26 MMOL/L (ref 21–32)
CREAT SERPL-MCNC: 0.86 MG/DL (ref 0.6–1.3)
EOSINOPHIL # BLD AUTO: 0 THOUSAND/ΜL (ref 0–0.61)
EOSINOPHIL NFR BLD AUTO: 0 % (ref 0–6)
ERYTHROCYTE [DISTWIDTH] IN BLOOD BY AUTOMATED COUNT: 11.8 % (ref 11.6–15.1)
GFR SERPL CREATININE-BSD FRML MDRD: 94 ML/MIN/1.73SQ M
GLUCOSE SERPL-MCNC: 150 MG/DL (ref 65–140)
GLUCOSE SERPL-MCNC: 154 MG/DL (ref 65–140)
GLUCOSE SERPL-MCNC: 174 MG/DL (ref 65–140)
GLUCOSE SERPL-MCNC: 227 MG/DL (ref 65–140)
HCT VFR BLD AUTO: 44.8 % (ref 36.5–49.3)
HGB BLD-MCNC: 15 G/DL (ref 12–17)
IMM GRANULOCYTES # BLD AUTO: 0.15 THOUSAND/UL (ref 0–0.2)
IMM GRANULOCYTES NFR BLD AUTO: 1 % (ref 0–2)
KCT BLD-ACNC: 212 SEC (ref 89–137)
LYMPHOCYTES # BLD AUTO: 1.82 THOUSANDS/ΜL (ref 0.6–4.47)
LYMPHOCYTES NFR BLD AUTO: 13 % (ref 14–44)
MCH RBC QN AUTO: 31 PG (ref 26.8–34.3)
MCHC RBC AUTO-ENTMCNC: 33.5 G/DL (ref 31.4–37.4)
MCV RBC AUTO: 93 FL (ref 82–98)
MONOCYTES # BLD AUTO: 0.61 THOUSAND/ΜL (ref 0.17–1.22)
MONOCYTES NFR BLD AUTO: 4 % (ref 4–12)
NEUTROPHILS # BLD AUTO: 11.74 THOUSANDS/ΜL (ref 1.85–7.62)
NEUTS SEG NFR BLD AUTO: 82 % (ref 43–75)
NRBC BLD AUTO-RTO: 0 /100 WBCS
PLATELET # BLD AUTO: 248 THOUSANDS/UL (ref 149–390)
PMV BLD AUTO: 9.7 FL (ref 8.9–12.7)
POTASSIUM SERPL-SCNC: 4.5 MMOL/L (ref 3.5–5.3)
RBC # BLD AUTO: 4.84 MILLION/UL (ref 3.88–5.62)
SODIUM SERPL-SCNC: 135 MMOL/L (ref 136–145)
SPECIMEN SOURCE: ABNORMAL
TROPONIN I SERPL-MCNC: 0.62 NG/ML
WBC # BLD AUTO: 14.35 THOUSAND/UL (ref 4.31–10.16)

## 2019-08-26 PROCEDURE — C1769 GUIDE WIRE: HCPCS | Performed by: INTERNAL MEDICINE

## 2019-08-26 PROCEDURE — C1725 CATH, TRANSLUMIN NON-LASER: HCPCS | Performed by: INTERNAL MEDICINE

## 2019-08-26 PROCEDURE — 84484 ASSAY OF TROPONIN QUANT: CPT | Performed by: INTERNAL MEDICINE

## 2019-08-26 PROCEDURE — C1887 CATHETER, GUIDING: HCPCS | Performed by: INTERNAL MEDICINE

## 2019-08-26 PROCEDURE — C1894 INTRO/SHEATH, NON-LASER: HCPCS | Performed by: INTERNAL MEDICINE

## 2019-08-26 PROCEDURE — 85347 COAGULATION TIME ACTIVATED: CPT

## 2019-08-26 PROCEDURE — 82948 REAGENT STRIP/BLOOD GLUCOSE: CPT

## 2019-08-26 PROCEDURE — 85730 THROMBOPLASTIN TIME PARTIAL: CPT | Performed by: INTERNAL MEDICINE

## 2019-08-26 PROCEDURE — 93306 TTE W/DOPPLER COMPLETE: CPT

## 2019-08-26 PROCEDURE — 99152 MOD SED SAME PHYS/QHP 5/>YRS: CPT | Performed by: INTERNAL MEDICINE

## 2019-08-26 PROCEDURE — 93454 CORONARY ARTERY ANGIO S&I: CPT | Performed by: INTERNAL MEDICINE

## 2019-08-26 PROCEDURE — C1874 STENT, COATED/COV W/DEL SYS: HCPCS

## 2019-08-26 PROCEDURE — 92920 PRQ TRLUML C ANGIOP 1ART&/BR: CPT | Performed by: INTERNAL MEDICINE

## 2019-08-26 PROCEDURE — 85025 COMPLETE CBC W/AUTO DIFF WBC: CPT | Performed by: INTERNAL MEDICINE

## 2019-08-26 PROCEDURE — C1760 CLOSURE DEV, VASC: HCPCS | Performed by: INTERNAL MEDICINE

## 2019-08-26 PROCEDURE — 99232 SBSQ HOSP IP/OBS MODERATE 35: CPT | Performed by: INTERNAL MEDICINE

## 2019-08-26 PROCEDURE — 92928 PRQ TCAT PLMT NTRAC ST 1 LES: CPT | Performed by: INTERNAL MEDICINE

## 2019-08-26 PROCEDURE — 80048 BASIC METABOLIC PNL TOTAL CA: CPT | Performed by: INTERNAL MEDICINE

## 2019-08-26 PROCEDURE — NC001 PR NO CHARGE: Performed by: INTERNAL MEDICINE

## 2019-08-26 PROCEDURE — 99153 MOD SED SAME PHYS/QHP EA: CPT | Performed by: INTERNAL MEDICINE

## 2019-08-26 PROCEDURE — 93306 TTE W/DOPPLER COMPLETE: CPT | Performed by: INTERNAL MEDICINE

## 2019-08-26 RX ORDER — NITROGLYCERIN 20 MG/100ML
INJECTION INTRAVENOUS CODE/TRAUMA/SEDATION MEDICATION
Status: COMPLETED | OUTPATIENT
Start: 2019-08-26 | End: 2019-08-26

## 2019-08-26 RX ORDER — OXYCODONE HYDROCHLORIDE 5 MG/1
5 TABLET ORAL EVERY 4 HOURS PRN
Status: DISCONTINUED | OUTPATIENT
Start: 2019-08-26 | End: 2019-08-28 | Stop reason: HOSPADM

## 2019-08-26 RX ORDER — SODIUM CHLORIDE 9 MG/ML
INJECTION, SOLUTION INTRAVENOUS
Status: COMPLETED | OUTPATIENT
Start: 2019-08-26 | End: 2019-08-26

## 2019-08-26 RX ORDER — METOPROLOL TARTRATE 5 MG/5ML
INJECTION INTRAVENOUS CODE/TRAUMA/SEDATION MEDICATION
Status: COMPLETED | OUTPATIENT
Start: 2019-08-26 | End: 2019-08-26

## 2019-08-26 RX ORDER — SODIUM CHLORIDE 9 MG/ML
100 INJECTION, SOLUTION INTRAVENOUS CONTINUOUS
Status: DISCONTINUED | OUTPATIENT
Start: 2019-08-26 | End: 2019-08-26

## 2019-08-26 RX ORDER — LIDOCAINE HYDROCHLORIDE 10 MG/ML
INJECTION, SOLUTION INFILTRATION; PERINEURAL CODE/TRAUMA/SEDATION MEDICATION
Status: COMPLETED | OUTPATIENT
Start: 2019-08-26 | End: 2019-08-26

## 2019-08-26 RX ORDER — HEPARIN SODIUM 1000 [USP'U]/ML
INJECTION, SOLUTION INTRAVENOUS; SUBCUTANEOUS CODE/TRAUMA/SEDATION MEDICATION
Status: COMPLETED | OUTPATIENT
Start: 2019-08-26 | End: 2019-08-26

## 2019-08-26 RX ORDER — METOPROLOL TARTRATE 50 MG/1
50 TABLET, FILM COATED ORAL EVERY 12 HOURS SCHEDULED
Status: DISCONTINUED | OUTPATIENT
Start: 2019-08-26 | End: 2019-08-28 | Stop reason: HOSPADM

## 2019-08-26 RX ORDER — MIDAZOLAM HYDROCHLORIDE 1 MG/ML
INJECTION INTRAMUSCULAR; INTRAVENOUS CODE/TRAUMA/SEDATION MEDICATION
Status: COMPLETED | OUTPATIENT
Start: 2019-08-26 | End: 2019-08-26

## 2019-08-26 RX ORDER — VERAPAMIL HYDROCHLORIDE 2.5 MG/ML
INJECTION, SOLUTION INTRAVENOUS CODE/TRAUMA/SEDATION MEDICATION
Status: COMPLETED | OUTPATIENT
Start: 2019-08-26 | End: 2019-08-26

## 2019-08-26 RX ORDER — FENTANYL CITRATE 50 UG/ML
INJECTION, SOLUTION INTRAMUSCULAR; INTRAVENOUS CODE/TRAUMA/SEDATION MEDICATION
Status: COMPLETED | OUTPATIENT
Start: 2019-08-26 | End: 2019-08-26

## 2019-08-26 RX ADMIN — HEPARIN SODIUM 4000 UNITS: 1000 INJECTION INTRAVENOUS; SUBCUTANEOUS at 10:31

## 2019-08-26 RX ADMIN — IOHEXOL 30 ML: 350 INJECTION, SOLUTION INTRAVENOUS at 11:51

## 2019-08-26 RX ADMIN — VERAPAMIL HYDROCHLORIDE 2.5 MG: 2.5 INJECTION INTRAVENOUS at 10:31

## 2019-08-26 RX ADMIN — EZETIMIBE 10 MG: 10 TABLET ORAL at 08:17

## 2019-08-26 RX ADMIN — MORPHINE SULFATE 2 MG: 2 INJECTION, SOLUTION INTRAMUSCULAR; INTRAVENOUS at 22:34

## 2019-08-26 RX ADMIN — METOPROLOL TARTRATE 50 MG: 50 TABLET, FILM COATED ORAL at 20:42

## 2019-08-26 RX ADMIN — IOHEXOL 100 ML: 350 INJECTION, SOLUTION INTRAVENOUS at 11:32

## 2019-08-26 RX ADMIN — FENTANYL CITRATE 25 MCG: 50 INJECTION, SOLUTION INTRAMUSCULAR; INTRAVENOUS at 10:32

## 2019-08-26 RX ADMIN — FENTANYL CITRATE 50 MCG: 50 INJECTION, SOLUTION INTRAMUSCULAR; INTRAVENOUS at 11:14

## 2019-08-26 RX ADMIN — IOHEXOL 100 ML: 350 INJECTION, SOLUTION INTRAVENOUS at 11:04

## 2019-08-26 RX ADMIN — DIPHENHYDRAMINE HCL 50 MG: 25 TABLET ORAL at 10:00

## 2019-08-26 RX ADMIN — TICAGRELOR 90 MG: 90 TABLET ORAL at 08:22

## 2019-08-26 RX ADMIN — METHYLPREDNISOLONE 32 MG: 16 TABLET ORAL at 05:59

## 2019-08-26 RX ADMIN — METOPROLOL TARTRATE 5 MG: 5 INJECTION INTRAVENOUS at 11:30

## 2019-08-26 RX ADMIN — AMLODIPINE BESYLATE 5 MG: 5 TABLET ORAL at 08:17

## 2019-08-26 RX ADMIN — NITROGLYCERIN 1 INCH: 20 OINTMENT TOPICAL at 12:47

## 2019-08-26 RX ADMIN — NICOTINE 1 PATCH: 21 PATCH TRANSDERMAL at 08:18

## 2019-08-26 RX ADMIN — HEPARIN SODIUM AND DEXTROSE 15.1 UNITS/KG/HR: 10000; 5 INJECTION INTRAVENOUS at 08:24

## 2019-08-26 RX ADMIN — NITROGLYCERIN 400 MCG: 20 INJECTION INTRAVENOUS at 11:22

## 2019-08-26 RX ADMIN — ASPIRIN 81 MG: 81 TABLET, COATED ORAL at 08:17

## 2019-08-26 RX ADMIN — HEPARIN SODIUM 8000 UNITS: 1000 INJECTION INTRAVENOUS; SUBCUTANEOUS at 10:54

## 2019-08-26 RX ADMIN — ACETAMINOPHEN 650 MG: 325 TABLET ORAL at 18:18

## 2019-08-26 RX ADMIN — MIDAZOLAM 2 MG: 1 INJECTION INTRAMUSCULAR; INTRAVENOUS at 10:27

## 2019-08-26 RX ADMIN — SODIUM CHLORIDE 100 ML/HR: 0.9 INJECTION, SOLUTION INTRAVENOUS at 12:35

## 2019-08-26 RX ADMIN — FENTANYL CITRATE 50 MCG: 50 INJECTION, SOLUTION INTRAMUSCULAR; INTRAVENOUS at 10:26

## 2019-08-26 RX ADMIN — NITROGLYCERIN 200 MCG: 20 INJECTION INTRAVENOUS at 11:06

## 2019-08-26 RX ADMIN — HEPARIN SODIUM 6000 UNITS: 1000 INJECTION INTRAVENOUS; SUBCUTANEOUS at 11:49

## 2019-08-26 RX ADMIN — NITROGLYCERIN 200 MCG: 20 INJECTION INTRAVENOUS at 10:30

## 2019-08-26 RX ADMIN — LIDOCAINE HYDROCHLORIDE 10 ML: 10 INJECTION, SOLUTION INFILTRATION; PERINEURAL at 10:43

## 2019-08-26 RX ADMIN — LIDOCAINE HYDROCHLORIDE 4 ML: 10 INJECTION, SOLUTION INFILTRATION; PERINEURAL at 10:28

## 2019-08-26 RX ADMIN — NITROGLYCERIN 0.4 MG: 0.4 TABLET SUBLINGUAL at 12:34

## 2019-08-26 RX ADMIN — NITROGLYCERIN 0.4 MG: 0.4 TABLET SUBLINGUAL at 17:29

## 2019-08-26 RX ADMIN — SODIUM CHLORIDE 100 ML/HR: 0.9 INJECTION, SOLUTION INTRAVENOUS at 10:21

## 2019-08-26 RX ADMIN — PRIMIDONE 100 MG: 50 TABLET ORAL at 08:22

## 2019-08-26 RX ADMIN — PRIMIDONE 100 MG: 50 TABLET ORAL at 20:42

## 2019-08-26 RX ADMIN — MIDAZOLAM 1 MG: 1 INJECTION INTRAMUSCULAR; INTRAVENOUS at 10:32

## 2019-08-26 RX ADMIN — TICAGRELOR 90 MG: 90 TABLET ORAL at 20:42

## 2019-08-26 RX ADMIN — FENTANYL CITRATE 25 MCG: 50 INJECTION, SOLUTION INTRAMUSCULAR; INTRAVENOUS at 11:07

## 2019-08-26 RX ADMIN — NITROGLYCERIN 1 INCH: 20 OINTMENT TOPICAL at 20:42

## 2019-08-26 RX ADMIN — MIDAZOLAM 2 MG: 1 INJECTION INTRAMUSCULAR; INTRAVENOUS at 11:14

## 2019-08-26 RX ADMIN — MIDAZOLAM 1 MG: 1 INJECTION INTRAMUSCULAR; INTRAVENOUS at 11:07

## 2019-08-26 RX ADMIN — LOSARTAN POTASSIUM 50 MG: 50 TABLET ORAL at 08:17

## 2019-08-26 RX ADMIN — ATORVASTATIN CALCIUM 80 MG: 80 TABLET, FILM COATED ORAL at 16:23

## 2019-08-26 RX ADMIN — METOPROLOL TARTRATE 37.5 MG: 25 TABLET ORAL at 08:17

## 2019-08-26 RX ADMIN — NITROGLYCERIN 0.4 MG: 0.4 TABLET SUBLINGUAL at 12:27

## 2019-08-26 RX ADMIN — MORPHINE SULFATE 2 MG: 2 INJECTION, SOLUTION INTRAMUSCULAR; INTRAVENOUS at 19:22

## 2019-08-26 NOTE — PLAN OF CARE
Problem: Potential for Falls  Goal: Patient will remain free of falls  Description  INTERVENTIONS:  - Assess patient frequently for physical needs  -  Identify cognitive and physical deficits and behaviors that affect risk of falls  -  [place] fall precautions as indicated by assessment   - Educate patient/family on patient safety including physical limitations  - Instruct patient to call for assistance with activity based on assessment  - Modify environment to reduce risk of injury  - Consider OT/PT consult to assist with strengthening/mobility  Outcome: Progressing     Problem: DISCHARGE PLANNING  Goal: Discharge to home or other facility with appropriate resources  Description  INTERVENTIONS:  - Identify barriers to discharge w/patient and caregiver  - Arrange for needed discharge resources and transportation as appropriate  - Identify discharge learning needs (meds, wound care, etc )  - Arrange for interpretive services to assist at discharge as needed  - Refer to Case Management Department for coordinating discharge planning if the patient needs post-hospital services based on physician/advanced practitioner order or complex needs related to functional status, cognitive ability, or social support system  Outcome: Progressing     Problem: Knowledge Deficit  Goal: Patient/family/caregiver demonstrates understanding of disease process, treatment plan, medications, and discharge instructions  Description  Complete learning assessment and assess knowledge base    Interventions:  - Provide teaching at level of understanding  - Provide teaching via preferred learning methods  Outcome: Progressing     Problem: PAIN - ADULT  Goal: Verbalizes/displays adequate comfort level or baseline comfort level  Description  Interventions:  - Encourage patient to monitor pain and request assistance  - Assess pain using appropriate pain scale  - Administer analgesics based on type and severity of pain and evaluate response  - Implement non-pharmacological measures as appropriate and evaluate response  - Consider cultural and social influences on pain and pain management  - Notify physician/advanced practitioner if interventions unsuccessful or patient reports new pain  Outcome: Progressing     Problem: INFECTION - ADULT  Goal: Absence or prevention of progression during hospitalization  Description  INTERVENTIONS:  - Assess and monitor for signs and symptoms of infection  - Monitor lab/diagnostic results  - Monitor all insertion sites, i e  indwelling lines, tubes, and drains  - Monitor endotracheal if appropriate and nasal secretions for changes in amount and color  - [place] appropriate cooling/warming therapies per order  - Administer medications as ordered  - Instruct and encourage patient and family to use good hand hygiene technique  - Identify and instruct in appropriate isolation precautions for identified infection/condition  Outcome: Progressing  Goal: Absence of fever/infection during neutropenic period  Description  INTERVENTIONS:  - Monitor WBC    Outcome: Progressing     Problem: SAFETY ADULT  Goal: Maintain or return to baseline ADL function  Description  INTERVENTIONS:  -  Assess patient's ability to carry out ADLs; assess patient's baseline for ADL function and identify physical deficits which impact ability to perform ADLs (bathing, care of mouth/teeth, toileting, grooming, dressing, etc )  - Assess/evaluate cause of self-care deficits   - Assess range of motion  - Assess patient's mobility; develop plan if impaired  - Assess patient's need for assistive devices and provide as appropriate  - Encourage maximum independence but intervene and supervise when necessary  - Involve family in performance of ADLs  - Assess for home care needs following discharge   - Consider OT consult to assist with ADL evaluation and planning for discharge  - Provide patient education as appropriate  Outcome: Progressing  Goal: Maintain or return mobility status to optimal level  Description  INTERVENTIONS:  - Assess patient's baseline mobility status (ambulation, transfers, stairs, etc )    - Identify cognitive and physical deficits and behaviors that affect mobility  - Identify mobility aids required to assist with transfers and/or ambulation (gait belt, sit-to-stand, lift, walker, cane, etc )  - [place] fall precautions as indicated by assessment  - Record patient progress and toleration of activity level on Mobility SBAR; progress patient to next Phase/Stage  - Instruct patient to call for assistance with activity based on assessment  - Consider rehabilitation consult to assist with strengthening/weightbearing, etc   Outcome: Progressing     Problem: CARDIOVASCULAR - ADULT  Goal: Maintains optimal cardiac output and hemodynamic stability  Description  INTERVENTIONS:  - Monitor I/O, vital signs and rhythm  - Monitor for S/S and trends of decreased cardiac output  - Administer and titrate ordered vasoactive medications to optimize hemodynamic stability  - Assess quality of pulses, skin color and temperature  - Assess for signs of decreased coronary artery perfusion  - Instruct patient to report change in severity of symptoms  Outcome: Progressing  Goal: Absence of cardiac dysrhythmias or at baseline rhythm  Description  INTERVENTIONS:  - Continuous cardiac monitoring, vital signs, obtain 12 lead EKG if ordered  - Administer antiarrhythmic and heart rate control medications as ordered  - Monitor electrolytes and administer replacement therapy as ordered  Outcome: Progressing

## 2019-08-26 NOTE — PLAN OF CARE
Problem: Potential for Falls  Goal: Patient will remain free of falls  Description  INTERVENTIONS:  - Assess patient frequently for physical needs  -  Identify cognitive and physical deficits and behaviors that affect risk of falls  -  [place] fall precautions as indicated by assessment   - Educate patient/family on patient safety including physical limitations  - Instruct patient to call for assistance with activity based on assessment  - Modify environment to reduce risk of injury  - Consider OT/PT consult to assist with strengthening/mobility  Outcome: Progressing     Problem: DISCHARGE PLANNING  Goal: Discharge to home or other facility with appropriate resources  Description  INTERVENTIONS:  - Identify barriers to discharge w/patient and caregiver  - Arrange for needed discharge resources and transportation as appropriate  - Identify discharge learning needs (meds, wound care, etc )  - Arrange for interpretive services to assist at discharge as needed  - Refer to Case Management Department for coordinating discharge planning if the patient needs post-hospital services based on physician/advanced practitioner order or complex needs related to functional status, cognitive ability, or social support system  Outcome: Progressing     Problem: Knowledge Deficit  Goal: Patient/family/caregiver demonstrates understanding of disease process, treatment plan, medications, and discharge instructions  Description  Complete learning assessment and assess knowledge base    Interventions:  - Provide teaching at level of understanding  - Provide teaching via preferred learning methods  Outcome: Progressing     Problem: PAIN - ADULT  Goal: Verbalizes/displays adequate comfort level or baseline comfort level  Description  Interventions:  - Encourage patient to monitor pain and request assistance  - Assess pain using appropriate pain scale  - Administer analgesics based on type and severity of pain and evaluate response  - Implement non-pharmacological measures as appropriate and evaluate response  - Consider cultural and social influences on pain and pain management  - Notify physician/advanced practitioner if interventions unsuccessful or patient reports new pain  Outcome: Progressing     Problem: INFECTION - ADULT  Goal: Absence or prevention of progression during hospitalization  Description  INTERVENTIONS:  - Assess and monitor for signs and symptoms of infection  - Monitor lab/diagnostic results  - Monitor all insertion sites, i e  indwelling lines, tubes, and drains  - Monitor endotracheal if appropriate and nasal secretions for changes in amount and color  - [place] appropriate cooling/warming therapies per order  - Administer medications as ordered  - Instruct and encourage patient and family to use good hand hygiene technique  - Identify and instruct in appropriate isolation precautions for identified infection/condition  Outcome: Progressing  Goal: Absence of fever/infection during neutropenic period  Description  INTERVENTIONS:  - Monitor WBC    Outcome: Progressing     Problem: SAFETY ADULT  Goal: Maintain or return to baseline ADL function  Description  INTERVENTIONS:  -  Assess patient's ability to carry out ADLs; assess patient's baseline for ADL function and identify physical deficits which impact ability to perform ADLs (bathing, care of mouth/teeth, toileting, grooming, dressing, etc )  - Assess/evaluate cause of self-care deficits   - Assess range of motion  - Assess patient's mobility; develop plan if impaired  - Assess patient's need for assistive devices and provide as appropriate  - Encourage maximum independence but intervene and supervise when necessary  - Involve family in performance of ADLs  - Assess for home care needs following discharge   - Consider OT consult to assist with ADL evaluation and planning for discharge  - Provide patient education as appropriate  Outcome: Progressing  Goal: Maintain or return mobility status to optimal level  Description  INTERVENTIONS:  - Assess patient's baseline mobility status (ambulation, transfers, stairs, etc )    - Identify cognitive and physical deficits and behaviors that affect mobility  - Identify mobility aids required to assist with transfers and/or ambulation (gait belt, sit-to-stand, lift, walker, cane, etc )  - [place] fall precautions as indicated by assessment  - Record patient progress and toleration of activity level on Mobility SBAR; progress patient to next Phase/Stage  - Instruct patient to call for assistance with activity based on assessment  - Consider rehabilitation consult to assist with strengthening/weightbearing, etc   Outcome: Progressing     Problem: CARDIOVASCULAR - ADULT  Goal: Maintains optimal cardiac output and hemodynamic stability  Description  INTERVENTIONS:  - Monitor I/O, vital signs and rhythm  - Monitor for S/S and trends of decreased cardiac output  - Administer and titrate ordered vasoactive medications to optimize hemodynamic stability  - Assess quality of pulses, skin color and temperature  - Assess for signs of decreased coronary artery perfusion  - Instruct patient to report change in severity of symptoms  Outcome: Progressing  Goal: Absence of cardiac dysrhythmias or at baseline rhythm  Description  INTERVENTIONS:  - Continuous cardiac monitoring, vital signs, obtain 12 lead EKG if ordered  - Administer antiarrhythmic and heart rate control medications as ordered  - Monitor electrolytes and administer replacement therapy as ordered  Outcome: Progressing

## 2019-08-26 NOTE — ASSESSMENT & PLAN NOTE
- NSTEMI s/p card cath and PCI for LAD 80-90% occlusion  - Aspirin, Brilinta, stain continued as per cardiology

## 2019-08-26 NOTE — NURSING NOTE
Patient arrived from cath lab complaining of CP  EKG and 2 sublingual nitro given  Patient stated chest pain "is a little better, but is still there " Dr West at bed side and read the EKG  Nitro paste applied  Patient is lethargic and drossy  upon arrival from cath lab but easily awakened  Patient is on bed rest with frequent VS and on monitor  Family at bedside, call bell within reach

## 2019-08-26 NOTE — SOCIAL WORK
Cm reviewed pt care coordination round  Pt is not medically stable for d/c  Pt is going for a cath today  Cm will f/u for final medical clearance and dcp needs

## 2019-08-26 NOTE — PLAN OF CARE
Problem: Potential for Falls  Goal: Patient will remain free of falls  Description  INTERVENTIONS:  - Assess patient frequently for physical needs  -  Identify cognitive and physical deficits and behaviors that affect risk of falls  -  [place] fall precautions as indicated by assessment   - Educate patient/family on patient safety including physical limitations  - Instruct patient to call for assistance with activity based on assessment  - Modify environment to reduce risk of injury  - Consider OT/PT consult to assist with strengthening/mobility  Outcome: Progressing     Problem: DISCHARGE PLANNING  Goal: Discharge to home or other facility with appropriate resources  Description  INTERVENTIONS:  - Identify barriers to discharge w/patient and caregiver  - Arrange for needed discharge resources and transportation as appropriate  - Identify discharge learning needs (meds, wound care, etc )  - Arrange for interpretive services to assist at discharge as needed  - Refer to Case Management Department for coordinating discharge planning if the patient needs post-hospital services based on physician/advanced practitioner order or complex needs related to functional status, cognitive ability, or social support system  Outcome: Progressing     Problem: Knowledge Deficit  Goal: Patient/family/caregiver demonstrates understanding of disease process, treatment plan, medications, and discharge instructions  Description  Complete learning assessment and assess knowledge base    Interventions:  - Provide teaching at level of understanding  - Provide teaching via preferred learning methods  Outcome: Progressing     Problem: PAIN - ADULT  Goal: Verbalizes/displays adequate comfort level or baseline comfort level  Description  Interventions:  - Encourage patient to monitor pain and request assistance  - Assess pain using appropriate pain scale  - Administer analgesics based on type and severity of pain and evaluate response  - Implement non-pharmacological measures as appropriate and evaluate response  - Consider cultural and social influences on pain and pain management  - Notify physician/advanced practitioner if interventions unsuccessful or patient reports new pain  Outcome: Progressing     Problem: INFECTION - ADULT  Goal: Absence or prevention of progression during hospitalization  Description  INTERVENTIONS:  - Assess and monitor for signs and symptoms of infection  - Monitor lab/diagnostic results  - Monitor all insertion sites, i e  indwelling lines, tubes, and drains  - Monitor endotracheal if appropriate and nasal secretions for changes in amount and color  - [place] appropriate cooling/warming therapies per order  - Administer medications as ordered  - Instruct and encourage patient and family to use good hand hygiene technique  - Identify and instruct in appropriate isolation precautions for identified infection/condition  Outcome: Progressing  Goal: Absence of fever/infection during neutropenic period  Description  INTERVENTIONS:  - Monitor WBC    Outcome: Progressing     Problem: SAFETY ADULT  Goal: Maintain or return to baseline ADL function  Description  INTERVENTIONS:  -  Assess patient's ability to carry out ADLs; assess patient's baseline for ADL function and identify physical deficits which impact ability to perform ADLs (bathing, care of mouth/teeth, toileting, grooming, dressing, etc )  - Assess/evaluate cause of self-care deficits   - Assess range of motion  - Assess patient's mobility; develop plan if impaired  - Assess patient's need for assistive devices and provide as appropriate  - Encourage maximum independence but intervene and supervise when necessary  - Involve family in performance of ADLs  - Assess for home care needs following discharge   - Consider OT consult to assist with ADL evaluation and planning for discharge  - Provide patient education as appropriate  Outcome: Progressing  Goal: Maintain or return mobility status to optimal level  Description  INTERVENTIONS:  - Assess patient's baseline mobility status (ambulation, transfers, stairs, etc )    - Identify cognitive and physical deficits and behaviors that affect mobility  - Identify mobility aids required to assist with transfers and/or ambulation (gait belt, sit-to-stand, lift, walker, cane, etc )  - [place] fall precautions as indicated by assessment  - Record patient progress and toleration of activity level on Mobility SBAR; progress patient to next Phase/Stage  - Instruct patient to call for assistance with activity based on assessment  - Consider rehabilitation consult to assist with strengthening/weightbearing, etc   Outcome: Progressing     Problem: CARDIOVASCULAR - ADULT  Goal: Maintains optimal cardiac output and hemodynamic stability  Description  INTERVENTIONS:  - Monitor I/O, vital signs and rhythm  - Monitor for S/S and trends of decreased cardiac output  - Administer and titrate ordered vasoactive medications to optimize hemodynamic stability  - Assess quality of pulses, skin color and temperature  - Assess for signs of decreased coronary artery perfusion  - Instruct patient to report change in severity of symptoms  Outcome: Progressing  Goal: Absence of cardiac dysrhythmias or at baseline rhythm  Description  INTERVENTIONS:  - Continuous cardiac monitoring, vital signs, obtain 12 lead EKG if ordered  - Administer antiarrhythmic and heart rate control medications as ordered  - Monitor electrolytes and administer replacement therapy as ordered  Outcome: Progressing

## 2019-08-26 NOTE — ASSESSMENT & PLAN NOTE
Lab Results   Component Value Date    HGBA1C 6 4 (H) 08/24/2019       Recent Labs     08/25/19  1656 08/25/19  2144 08/26/19  0711 08/26/19  1238   POCGLU 68 120 227* 174*       Blood Sugar Average: Last 72 hrs:  (P) 154  125   - Optimally controlled BG  - started on steroid in preparation for card cath yesterday as patient gave history of sever allergy to shellfish  Monitor BG and may stop steroid if patient showed no symptom of reaction

## 2019-08-26 NOTE — NURSING NOTE
Within 1/2 hour of patient arriving from Cath lab BP 77/56 manually, Pulse remains stable 67  Patient denied chest pain, sob  Marshal PRO on unit notified to come and assess patient  Mr Paiz at bed side, IV fluids increased to 150 ml/ hr from 100 ml/hr  BP improved in 120's documented under vitals   Patient remains stable on bed rest

## 2019-08-26 NOTE — PROGRESS NOTES
Progress Note - Cornelia Priscillas [birth year], 60 y o  male MRN: [id number]    Unit/Bed#: E4 -01 Encounter: [id number]    Primary Care Provider: Katalina Monroy DO   Date and time admitted to hospital: 8/24/2019 11:36 AM        * NSTEMI (non-ST elevated myocardial infarction) [facility])  Assessment & Plan  - NSTEMI s/p card cath and PCI for LAD 80-90% occlusion  - Aspirin, Brilinta, stain continued as per cardiology    Type 2 diabetes mellitus [facility])  Assessment & Plan  Lab Results   Component Value Date    HGBA1C 6 4 (H) 08/24/2019       Recent Labs     08/25/19  1656 08/25/19  2144 08/26/19  0711 08/26/19  1238   POCGLU 68 120 227* 174*       Blood Sugar Average: Last 72 hrs:  (P) 154  125   - Optimally controlled BG  - started on steroid in preparation for card cath yesterday as patient gave history of sever allergy to shellfish  Monitor BG and may stop steroid if patient showed no symptom of reaction  Mixed hyperlipidemia  Assessment & Plan  Continue statin    HTN (hypertension)  Assessment & Plan  - BP optimally controlled  - continue home medications for now      VTE Pharmacologic Prophylaxis:   Pharmacologic: Enoxaparin (Lovenox)  Mechanical VTE Prophylaxis in Place: Yes    Patient Centered Rounds: I have performed bedside rounds with nursing staff today  Discussions with Specialists or Other Care Team Provider: cardioolgy    Education and Discussions with Family / Patient: yes    Time Spent for Care: 30 minutes  More than 50% of total time spent on counseling and coordination of care as described above      Current Length of Stay: 2 day(s)    Current Patient Status: Inpatient   Certification Statement: The patient will continue to require additional inpatient hospital stay due to post cath evaluation, telemonitor    Discharge Plan: probably tomorrow once cleared by cardiology    Code Status: Level 1 - Full Code      Subjective:   Patient complaining of mild left sided chest pain this morning and was worried about the procedure  Was concerned about the procedure, his concerns were addressed  Objective:     Vitals:   Temp (24hrs), Av 8 °F (36 6 °C), Min:96 5 °F (35 8 °C), Max:98 5 °F (36 9 °C)    Temp:  [96 5 °F (35 8 °C)-98 5 °F (36 9 °C)] 96 5 °F (35 8 °C)  HR:  [63-78] 64  Resp:  [16-20] 16  BP: ()/(56-87) 146/79  SpO2:  [95 %-97 %] 95 %  Body mass index is 33 34 kg/m²  Input and Output Summary (last 24 hours):     No intake or output data in the 24 hours ending 19 1602    Physical Exam:     Physical Exam   Constitutional: He is oriented to person, place, and time  He appears well-developed and well-nourished  HENT:   Head: Normocephalic and atraumatic  Eyes: Pupils are equal, round, and reactive to light  Conjunctivae and EOM are normal    Neck: Normal range of motion  No JVD present  No tracheal deviation present  No thyromegaly present  Cardiovascular: Normal rate, regular rhythm, normal heart sounds and intact distal pulses  Exam reveals no gallop and no friction rub  No murmur heard  Pulmonary/Chest: Effort normal and breath sounds normal  No stridor  No respiratory distress  He has no wheezes  He has no rales  Abdominal: Soft  Bowel sounds are normal  He exhibits no distension and no mass  There is no tenderness  There is no guarding  Musculoskeletal: Normal range of motion  He exhibits no edema, tenderness or deformity  Neurological: He is alert and oriented to person, place, and time  He displays normal reflexes  No cranial nerve deficit  He exhibits normal muscle tone  Coordination normal    Skin: Skin is warm  Capillary refill takes less than 2 seconds  Psychiatric: He has a normal mood and affect   His behavior is normal  Judgment and thought content normal          Additional Data:     Labs:    Results from last 7 days   Lab Units 19  1536   WBC Thousand/uL 14 35*   HEMOGLOBIN g/dL 15 0   HEMATOCRIT % 44 8   PLATELETS Thousands/uL 248   NEUTROS PCT % 82* LYMPHS PCT % 13*   MONOS PCT % 4   EOS PCT % 0     Results from last 7 days   Lab Units 08/26/19  1536  08/24/19  1147   SODIUM mmol/L 135*   < > 138   POTASSIUM mmol/L 4 5   < > 5 8*   CHLORIDE mmol/L 101   < > 106   CO2 mmol/L 26   < > 27   BUN mg/dL 13   < > 13   CREATININE mg/dL 0 86   < > 0 76   ANION GAP mmol/L 8   < > 5   CALCIUM mg/dL 9 5   < > 8 9   ALBUMIN g/dL  --   --  3 4*   TOTAL BILIRUBIN mg/dL  --   --  0 79   ALK PHOS U/L  --   --  65   ALT U/L  --   --  26   AST U/L  --   --  48*   GLUCOSE RANDOM mg/dL 154*   < > 130    < > = values in this interval not displayed  Results from last 7 days   Lab Units 08/24/19  1307   INR  1 03     Results from last 7 days   Lab Units 08/26/19  1238 08/26/19  0711 08/25/19  2144 08/25/19  1656 08/25/19  1127 08/25/19  0703 08/24/19  2109 08/24/19  1601   POC GLUCOSE mg/dl 174* 227* 120 68 198* 158* 189* 99     Results from last 7 days   Lab Units 08/24/19  1602   HEMOGLOBIN A1C % 6 4*               * I Have Reviewed All Lab Data Listed Above  * Additional Pertinent Lab Tests Reviewed:  Eun 66 Admission Reviewed    Imaging:    Imaging Reports Reviewed Today Include: cath report  Imaging Personally Reviewed by Myself Includes:  none    Recent Cultures (last 7 days):           Last 24 Hours Medication List:     Current Facility-Administered Medications:  acetaminophen 650 mg Oral Q4H PRN Lilo Saenz MD    albuterol 2 puff Inhalation Q4H PRN Lilo Saenz MD    amLODIPine 5 mg Oral Daily Nu Polanco MD    aspirin 81 mg Oral Daily Nu Polanco MD    atorvastatin 80 mg Oral Daily With [employer], DO    clotrimazole  Topical BID Lilo Saenz MD    diphenhydrAMINE 50 mg Oral 60 Min Pre-Op Nu Polanco MD    ezetimibe 10 mg Oral Daily Lilo Saenz MD    fluticasone 2 spray Nasal Daily PRN Lilo Saenz MD    insulin lispro 1-6 Units Subcutaneous TID [facility] Lilo Saenz MD    LORazepam 0 5 mg Intravenous Q6H PRN Clarence Lopez ABIEL Amin    losartan 50 mg Oral Daily David Park MD    metoprolol tartrate 50 mg Oral Q12H Sarah Mills MD    nicotine 1 patch Transdermal Daily David Park MD    nitroglycerin 1 inch Topical TID Jose Eduardo Wagner MD    nitroglycerin 0 4 mg Sublingual Q5 Min PRN David Park MD    oxyCODONE 5 mg Oral Q4H PRN Jose Eduardo Wagner MD    primidone 100 mg Oral Q12H [address] David Park MD    sodium chloride 100 mL/hr Intravenous Continuous Jose Eduardo Wagner MD Last Rate: 100 mL/hr (08/26/19 1235)   ticagrelor 90 mg Oral Q12H [address] Brandon Wise MD         Today, Patient Was Seen By: Juhi Luna MD    ** Please Note: Dictation voice to text software may have been used in the creation of this document   **

## 2019-08-26 NOTE — PROGRESS NOTES
Cardiac cath performed via the RFA - closed with angioseal at 11:45 AM   The R radial artery was accessed but due to severe tortuosity, the coronary arteries could not be engaged  80-90 % very long mid LAD lesion  The RCA and L Cx were patent  Successful PCI with placement of 2 long [place] in the mid LAD in an overlapping fashion    2 75 x 38 distal  2 75 x 33 proximal    Both post dilated to 3 0 mm

## 2019-08-26 NOTE — NURSING NOTE
Assumed care of pt at 1500  Pt alert and oriented, resting comfortably with family at bedside  VSS  Denies any complaints at this time  Cath sites clean, dry, intact  +2 pulses bilaterally for upper and lower extremities  Will continue to monitor closely

## 2019-08-26 NOTE — PROGRESS NOTES
Patient evaluated in his room post cath and then around 2:30 PM     He c/o 3-5 / 10 CP  ECG showed biphasic T waves in the anterior precordial leads  He was started on NTP  Repeat ECG from 3 pm shows improvement in his ECG  CBC, BMP, troponin level ordered  I reviewed his cath films  An excellent angiographic result was obtained with LAD stenting  He did have occlusion of S1 which was likely from thrombus  This is c/w his ECG changes  Continue present medications including ASA and Brilinta      ECG in am  Troponin in am

## 2019-08-26 NOTE — NURSING NOTE
Pt c/o chest and back pain, slightly decreased with one dose of nitro given at 1729 and turning to left side  Pt feels that pain may be more related to positional changes, requested tylenol  PRN dose given at 1818 for 5/10 chest and back pain  VSS, call bell in reach  Pt NSR with no events on telemetry  Will continue to monitor closely  Message sent to cardiology on call via [employer], awaiting response  UPDATED 8/26/19 1866:  Spoke with Dr Ryan Enrique over the phone, does not wish to order another EKG at this time  Will order IV morphine for pain management

## 2019-08-27 LAB
ANION GAP SERPL CALCULATED.3IONS-SCNC: 6 MMOL/L (ref 4–13)
BASOPHILS # BLD AUTO: 0.05 THOUSANDS/ΜL (ref 0–0.1)
BASOPHILS NFR BLD AUTO: 0 % (ref 0–1)
BUN SERPL-MCNC: 15 MG/DL (ref 5–25)
CALCIUM SERPL-MCNC: 9.1 MG/DL (ref 8.3–10.1)
CHLORIDE SERPL-SCNC: 103 MMOL/L (ref 100–108)
CO2 SERPL-SCNC: 26 MMOL/L (ref 21–32)
CREAT SERPL-MCNC: 0.85 MG/DL (ref 0.6–1.3)
EOSINOPHIL # BLD AUTO: 0.2 THOUSAND/ΜL (ref 0–0.61)
EOSINOPHIL NFR BLD AUTO: 1 % (ref 0–6)
ERYTHROCYTE [DISTWIDTH] IN BLOOD BY AUTOMATED COUNT: 11.9 % (ref 11.6–15.1)
GFR SERPL CREATININE-BSD FRML MDRD: 95 ML/MIN/1.73SQ M
GLUCOSE SERPL-MCNC: 141 MG/DL (ref 65–140)
GLUCOSE SERPL-MCNC: 184 MG/DL (ref 65–140)
GLUCOSE SERPL-MCNC: 190 MG/DL (ref 65–140)
GLUCOSE SERPL-MCNC: 97 MG/DL (ref 65–140)
HCT VFR BLD AUTO: 38.6 % (ref 36.5–49.3)
HGB BLD-MCNC: 13.1 G/DL (ref 12–17)
IMM GRANULOCYTES # BLD AUTO: 0.11 THOUSAND/UL (ref 0–0.2)
IMM GRANULOCYTES NFR BLD AUTO: 1 % (ref 0–2)
LYMPHOCYTES # BLD AUTO: 2.49 THOUSANDS/ΜL (ref 0.6–4.47)
LYMPHOCYTES NFR BLD AUTO: 17 % (ref 14–44)
MCH RBC QN AUTO: 31.7 PG (ref 26.8–34.3)
MCHC RBC AUTO-ENTMCNC: 33.9 G/DL (ref 31.4–37.4)
MCV RBC AUTO: 94 FL (ref 82–98)
MONOCYTES # BLD AUTO: 1.13 THOUSAND/ΜL (ref 0.17–1.22)
MONOCYTES NFR BLD AUTO: 8 % (ref 4–12)
NEUTROPHILS # BLD AUTO: 10.36 THOUSANDS/ΜL (ref 1.85–7.62)
NEUTS SEG NFR BLD AUTO: 73 % (ref 43–75)
NRBC BLD AUTO-RTO: 0 /100 WBCS
PLATELET # BLD AUTO: 205 THOUSANDS/UL (ref 149–390)
PMV BLD AUTO: 9.8 FL (ref 8.9–12.7)
POTASSIUM SERPL-SCNC: 4 MMOL/L (ref 3.5–5.3)
RBC # BLD AUTO: 4.13 MILLION/UL (ref 3.88–5.62)
SODIUM SERPL-SCNC: 135 MMOL/L (ref 136–145)
TROPONIN I SERPL-MCNC: 8 NG/ML
WBC # BLD AUTO: 14.34 THOUSAND/UL (ref 4.31–10.16)

## 2019-08-27 PROCEDURE — 85025 COMPLETE CBC W/AUTO DIFF WBC: CPT | Performed by: PHYSICIAN ASSISTANT

## 2019-08-27 PROCEDURE — 99232 SBSQ HOSP IP/OBS MODERATE 35: CPT | Performed by: INTERNAL MEDICINE

## 2019-08-27 PROCEDURE — 99232 SBSQ HOSP IP/OBS MODERATE 35: CPT

## 2019-08-27 PROCEDURE — 84484 ASSAY OF TROPONIN QUANT: CPT | Performed by: INTERNAL MEDICINE

## 2019-08-27 PROCEDURE — 93005 ELECTROCARDIOGRAM TRACING: CPT

## 2019-08-27 PROCEDURE — 82948 REAGENT STRIP/BLOOD GLUCOSE: CPT

## 2019-08-27 PROCEDURE — 80048 BASIC METABOLIC PNL TOTAL CA: CPT | Performed by: PHYSICIAN ASSISTANT

## 2019-08-27 RX ORDER — CALCIUM CARBONATE 200(500)MG
500 TABLET,CHEWABLE ORAL DAILY PRN
Status: DISCONTINUED | OUTPATIENT
Start: 2019-08-27 | End: 2019-08-28 | Stop reason: HOSPADM

## 2019-08-27 RX ADMIN — EZETIMIBE 10 MG: 10 TABLET ORAL at 08:28

## 2019-08-27 RX ADMIN — TICAGRELOR 90 MG: 90 TABLET ORAL at 20:20

## 2019-08-27 RX ADMIN — AMLODIPINE BESYLATE 5 MG: 5 TABLET ORAL at 08:28

## 2019-08-27 RX ADMIN — NITROGLYCERIN 1 INCH: 20 OINTMENT TOPICAL at 08:26

## 2019-08-27 RX ADMIN — METOPROLOL TARTRATE 50 MG: 50 TABLET, FILM COATED ORAL at 08:30

## 2019-08-27 RX ADMIN — PRIMIDONE 100 MG: 50 TABLET ORAL at 20:20

## 2019-08-27 RX ADMIN — ASPIRIN 81 MG: 81 TABLET, COATED ORAL at 08:30

## 2019-08-27 RX ADMIN — LOSARTAN POTASSIUM 50 MG: 50 TABLET ORAL at 08:28

## 2019-08-27 RX ADMIN — OXYCODONE HYDROCHLORIDE 5 MG: 5 TABLET ORAL at 10:06

## 2019-08-27 RX ADMIN — TICAGRELOR 90 MG: 90 TABLET ORAL at 08:29

## 2019-08-27 RX ADMIN — FLUTICASONE PROPIONATE 2 SPRAY: 50 SPRAY, METERED NASAL at 08:28

## 2019-08-27 RX ADMIN — INSULIN LISPRO 2 UNITS: 100 INJECTION, SOLUTION INTRAVENOUS; SUBCUTANEOUS at 11:42

## 2019-08-27 RX ADMIN — INSULIN LISPRO 1 UNITS: 100 INJECTION, SOLUTION INTRAVENOUS; SUBCUTANEOUS at 08:30

## 2019-08-27 RX ADMIN — CLOTRIMAZOLE: 10 CREAM TOPICAL at 08:24

## 2019-08-27 RX ADMIN — ATORVASTATIN CALCIUM 80 MG: 80 TABLET, FILM COATED ORAL at 16:47

## 2019-08-27 RX ADMIN — NITROGLYCERIN 1 INCH: 20 OINTMENT TOPICAL at 20:22

## 2019-08-27 RX ADMIN — CALCIUM CARBONATE (ANTACID) CHEW TAB 500 MG 500 MG: 500 CHEW TAB at 20:21

## 2019-08-27 RX ADMIN — METOPROLOL TARTRATE 50 MG: 50 TABLET, FILM COATED ORAL at 20:20

## 2019-08-27 RX ADMIN — PRIMIDONE 100 MG: 50 TABLET ORAL at 08:29

## 2019-08-27 RX ADMIN — MORPHINE SULFATE 2 MG: 2 INJECTION, SOLUTION INTRAMUSCULAR; INTRAVENOUS at 03:06

## 2019-08-27 RX ADMIN — NICOTINE 1 PATCH: 21 PATCH TRANSDERMAL at 08:25

## 2019-08-27 NOTE — SOCIAL WORK
Cm reviewed pt care coordination rounds  Pt is not medically cleared for d/c  Need to stay for another 24 hrs  Cardiology is following for Elevated trops and need to be cleared by cards  Cm will f/u for final medical clearance and dcp needs

## 2019-08-27 NOTE — SOCIAL WORK
Cm met with pt and brother at bedside to discuss cm role and dcp needs  Pt lives with children and grandchildren in a [address] [facility] [address] [place]LE[address] to get inside and 4 [place] inside  Prior to admission, pt was independent will all ADL's, drives self and has no DME's  Pt has no prior hx of HHC or SNF services  Denies any MH or D/A IP treatments  Pt has RX coverage and use [facility] for his prescriptions  Pt has not POA or advance directives  Daughter Santa Ugarte is  Primary contact [phone]  Patient/caregiver received discharge checklist   Content reviewed  Patient/caregiver encouraged to participate in discharge plan of care prior to discharge home  CM reviewed d/c planning process including the following: identifying help at home, patient preference for d/c planning needs, [facility] Meds to Bed program, availability of treatment team to discuss questions or concerns patient and/or family may have regarding understanding medications and recognizing signs and symptoms once discharged  CM also encouraged patient to follow up with all recommended appointments after discharge  Patient advised of importance for patient and family to participate in managing patients medical well being

## 2019-08-27 NOTE — ASSESSMENT & PLAN NOTE
Lab Results   Component Value Date    HGBA1C 6 4 (H) 08/24/2019       Recent Labs     08/26/19  1238 08/26/19  1728 08/27/19  0747 08/27/19  1123   POCGLU 174* 150* 184* 190*       Blood Sugar Average: Last 72 hrs:  (P) 159 [id number]   - Optimally controlled BG  - continue to monitor and adjust insulin dose

## 2019-08-27 NOTE — PROGRESS NOTES
Progress Note - Cassandra Delaney [birth year], 60 y o  male MRN: [id number]    Unit/Bed#: E4 -01 Encounter: [id number]    Primary Care Provider: Leti Sung DO   Date and time admitted to hospital: 8/24/2019 11:36 AM        * NSTEMI (non-ST elevated myocardial infarction) [facility])  Assessment & Plan  - NSTEMI s/p card cath and PCI for LAD 80-90% occlusion  - Aspirin, Brilinta, Toprol, Losartan, HI lipitor, Zetia continued as per cardiology  - elevated Troponin to 8 0 after cath, EKG showed TWI in the anterolateral leads since admission however unchanged  - patient's chest pain character has not changed, less intense than before however still persisted  - ECHO with normal EF  - no significant arrhthymias on Telemonitor  - spoke with cardiologist and plan of care for today discussed  Type 2 diabetes mellitus [facility])  Assessment & Plan  Lab Results   Component Value Date    HGBA1C 6 4 (H) 08/24/2019       Recent Labs     08/26/19  1238 08/26/19  1728 08/27/19  0747 08/27/19  1123   POCGLU 174* 150* 184* 190*       Blood Sugar Average: Last 72 hrs:  (P) 159 [id number]   - Optimally controlled BG  - continue to monitor and adjust insulin dose    Mixed hyperlipidemia  Assessment & Plan  Continue statin, Azetia    HTN (hypertension)  Assessment & Plan  - BP optimally controlled  - continue home medications for now      VTE Pharmacologic Prophylaxis:   Pharmacologic: Enoxaparin (Lovenox)    Patient Centered Rounds: I have performed bedside rounds with nursing staff today    Discussions with Specialists or Other Care Team Provider:     Education and Discussions with Family / Patient:     Time Spent for Care: 35 mins  More than 50% of total time spent on counseling and coordination of care as described above      Current Length of Stay: 3 day(s)    Current Patient Status: Inpatient   Certification Statement: The patient will continue to require additional inpatient hospital stay due to elevated troponin, continued chest pain    Discharge Plan: probably tomorrow    Code Status: Level 1 - Full Code      Subjective:   Patient still complaining of mild chest pain  However denies palpitation, shortness of breath, dizziness, headache  Troponin was elevated post cath and PCI with unchanged EKG; cardiology following and patient is clinically stable and chest pain character not changed  EKG showed TWI since admission which hasn't changed  Patient will be followed today on Telemetry and will probably dc tomorrow once cleared by cardiology  Objective:     Vitals:   Temp (24hrs), Av 5 °F (36 4 °C), Min:96 5 °F (35 8 °C), Max:98 4 °F (36 9 °C)    Temp:  [96 5 °F (35 8 °C)-98 4 °F (36 9 °C)] 96 5 °F (35 8 °C)  HR:  [61-77] 61  Resp:  [16-18] 18  BP: ()/(56-87) 134/79  SpO2:  [95 %-100 %] 96 %  Body mass index is 33 34 kg/m²  Input and Output Summary (last 24 hours):     No intake or output data in the 24 hours ending 19 1157    Physical Exam:     General appearance: alert and oriented, in no acute distress, appears stated age and cooperative  Head: Normocephalic, without obvious abnormality, atraumatic  Lungs: clear to auscultation bilaterally  Heart: regular rate and rhythm and S1, S2 normal  Abdomen: soft, non-tender; bowel sounds normal; no masses,  no organomegaly and soft, non-tender, positive bowel sounds   Back: no tenderness to percussion or palpation  Extremities: extremities atraumatic, no cyanosis or edema  Neurologic: Alert and oriented X 3, normal strength and tone  Normal symmetric reflexes   Normal coordination and gait  Mental status: Alert, oriented, thought content appropriate    Additional Data:     Labs:    Results from last 7 days   Lab Units 19  0550   WBC Thousand/uL 14 34*   HEMOGLOBIN g/dL 13 1   HEMATOCRIT % 38 6   PLATELETS Thousands/uL 205   NEUTROS PCT % 73   LYMPHS PCT % 17   MONOS PCT % 8   EOS PCT % 1     Results from last 7 days   Lab Units 19  0550  19  1147 SODIUM mmol/L 135*   < > 138   POTASSIUM mmol/L 4 0   < > 5 8*   CHLORIDE mmol/L 103   < > 106   CO2 mmol/L 26   < > 27   BUN mg/dL 15   < > 13   CREATININE mg/dL 0 85   < > 0 76   ANION GAP mmol/L 6   < > 5   CALCIUM mg/dL 9 1   < > 8 9   ALBUMIN g/dL  --   --  3 4*   TOTAL BILIRUBIN mg/dL  --   --  0 79   ALK PHOS U/L  --   --  65   ALT U/L  --   --  26   AST U/L  --   --  48*   GLUCOSE RANDOM mg/dL 141*   < > 130    < > = values in this interval not displayed  Results from last 7 days   Lab Units 08/24/19  1307   INR  1 03     Results from last 7 days   Lab Units 08/27/19  1123 08/27/19  0747 08/26/19  1728 08/26/19  1238 08/26/19  0711 08/25/19  2144 08/25/19  1656 08/25/19  1127 08/25/19  0703 08/24/19  2109 08/24/19  1601   POC GLUCOSE mg/dl 190* 184* 150* 174* 227* 120 68 198* 158* 189* 99     Results from last 7 days   Lab Units 08/24/19  1602   HEMOGLOBIN A1C % 6 4*               * I Have Reviewed All Lab Data Listed Above  * Additional Pertinent Lab Tests Reviewed:  Eun 66 Admission Reviewed    Imaging:    Imaging Reports Reviewed Today Include: images reviewed    Recent Cultures (last 7 days):           Last 24 Hours Medication List:     Current Facility-Administered Medications:  acetaminophen 650 mg Oral Q4H PRN Carlotta Felty, MD   albuterol 2 puff Inhalation Q4H PRN Carlotta Felty, MD   amLODIPine 5 mg Oral Daily Pastora Graham MD   aspirin 81 mg Oral Daily Pastora Graham MD   atorvastatin 80 mg Oral Daily With [employer], DO   clotrimazole  Topical BID Carlotta Felty, MD   diphenhydrAMINE 50 mg Oral 60 Min Pre-Op Pastora Graham MD   ezetimibe 10 mg Oral Daily Carlotta Felty, MD   fluticasone 2 spray Nasal Daily PRN Carlotta Felty, MD   insulin lispro 1-6 Units Subcutaneous TID AC Carlotta Felty, MD   LORazepam 0 5 mg Intravenous Q6H PRN Dae Amin PA-C   losartan 50 mg Oral Daily Carlotta Felty, MD   metoprolol tartrate 50 mg Oral Q12H [address] Rodolfo Garzon MD morphine injection 2 mg Intravenous Q3H PRN Santino Alvarez DO   nicotine 1 patch Transdermal Daily Marc Madden MD   nitroglycerin 1 inch Topical TID Donna Sanabria MD   nitroglycerin 0 4 mg Sublingual Q5 Min PRN Marc Madden MD   oxyCODONE 5 mg Oral Q4H PRN Donna Sanabria MD   primidone 100 mg Oral Q12H [address] Marc Madden MD   ticagrelor 90 mg Oral Q12H [address] Yael Henry MD        Today, Patient Was Seen By: Nickie Torres MD    ** Please Note: Dictation voice to text software may have been used in the creation of this document   **

## 2019-08-27 NOTE — ASSESSMENT & PLAN NOTE
- NSTEMI s/p card cath and PCI for LAD 80-90% occlusion  - Aspirin, Brilinta, Toprol, Losartan, HI lipitor, Zetia continued as per cardiology  - elevated Troponin to 8 0 after cath, EKG showed TWI in the anterolateral leads since admission however unchanged  - patient's chest pain character has not changed, less intense than before however still persisted  - ECHO with normal EF  - no significant arrhthymias on Telemonitor  - spoke with cardiologist and plan of care for today discussed

## 2019-08-27 NOTE — PROGRESS NOTES
Progress Note - Cardiology   Esa Rizvi 60 y o  male MRN: [id number]  Unit/Bed#: E4 -01 Encounter: [id number]      Assessment/Recommendations/Discussion:   1  Non ST-elevation myocardial infarction   -status post cardiac catheterization with 80-90% very long mid LAD lesion status post PCI with 2 drug-eluting stents in the mid LAD - 2 75 x 38 distal, 2 75 x 33 mm proximal   RCA and left circ were patent  2  Hypertension  3  Diabetes mellitus  4  Hyperlipidemia    PLAN  Patient still with off and on chest pain since procedure  His nitroglycerin patch, not really giving any relief  He does get relief with morphine  Would continue  Noted on cardiac catheterization - excellent angiographic result with LAD PCI, however he did have occlusion of a small side branch which was likely from thrombus  He does have some ECG changes on his electrocardiogram yesterday with T-wave inversions in the anterior lateral leads that is consistent with this territory    -his elevated troponin is likely secondary to this  Will continue his current medications, aspirin, Brilinta  Continue Lipitor 80 mg  Zetia 10 mg  Losartan 50 mg, Toprol 50 mg  He must have a new ECG performed today  Ordered  His echo yesterday showed ejection fraction estimated at 60%  Trace mitral regurgitation  Trace tricuspid regurgitation    Subjective:   HPI  Still with off and on chest pain since cardiac catheterization yesterday  ECG post procedure showed anterolateral T-wave inversions  Troponin is noted 8 this morning  Review of Systems: As noted in HPI  Rest of ROS is negative      Vitals:   /63 (BP Location: Right arm)   Pulse 70   Temp 98 4 °F (36 9 °C) (Tympanic)   Resp 18   Wt 105 kg (232 lb 5 8 oz)   SpO2 100%   BMI 33 34 kg/m²   Vitals:    08/24/19 1143   Weight: 105 kg (232 lb 5 8 oz)     No intake or output data in the 24 hours ending 08/27/19 1015    Physical Exam:  General appearance: alert and oriented, in no acute distress  Head: Normocephalic, without obvious abnormality, atraumatic  Eyes: conjunctivae/corneas clear  Anicteric  Neck: no adenopathy, no carotid bruit, no JVD, supple, symmetrical, trachea midline and thyroid not enlarged, no tenderness  Lungs: clear to auscultation bilaterally  Heart: regular rate and rhythm, S1, S2 normal, no murmur, click, rub or gallop  Abdomen: soft, non-tender; bowel sounds normal; no masses,  no organomegaly  Extremities: extremities normal, warm and well-perfused; no cyanosis, clubbing, or edema  Skin: Skin color, texture, turgor normal  No rashes or lesions     Lab Results:  Results from last 7 days   Lab Units 08/27/19  0550   WBC Thousand/uL 14 34*   HEMOGLOBIN g/dL 13 1   HEMATOCRIT % 38 6   PLATELETS Thousands/uL 205     Results from last 7 days   Lab Units 08/27/19  0550  08/24/19  1147   POTASSIUM mmol/L 4 0   < > 5 8*   CHLORIDE mmol/L 103   < > 106   CO2 mmol/L 26   < > 27   BUN mg/dL 15   < > 13   CREATININE mg/dL 0 85   < > 0 76   CALCIUM mg/dL 9 1   < > 8 9   ALK PHOS U/L  --   --  65   ALT U/L  --   --  26   AST U/L  --   --  48*    < > = values in this interval not displayed       Results from last 7 days   Lab Units 08/27/19  0550   POTASSIUM mmol/L 4 0   CHLORIDE mmol/L 103   CO2 mmol/L 26   BUN mg/dL 15   CREATININE mg/dL 0 85   CALCIUM mg/dL 9 1           Medications:    Current Facility-Administered Medications:     acetaminophen (TYLENOL) tablet 650 mg, 650 mg, Oral, Q4H PRN, Chepe Dash MD, 650 mg at 08/26/19 1818    albuterol (PROVENTIL HFA,VENTOLIN HFA) inhaler 2 puff, 2 puff, Inhalation, Q4H PRN, Chepe Dash MD    amLODIPine (NORVASC) tablet 5 mg, 5 mg, Oral, Daily, Wallace Martinez MD, 5 mg at 08/27/19 8719    aspirin (ECOTRIN LOW STRENGTH) EC tablet 81 mg, 81 mg, Oral, Daily, Wallace Martinez MD, 81 mg at 08/27/19 0830    atorvastatin (LIPITOR) tablet 80 mg, 80 mg, Oral, Daily With Monse Wing DO, 80 mg at 08/26/19 1372   clotrimazole (LOTRIMIN) 1 % cream, , Topical, BID, Lilo Saenz MD    diphenhydrAMINE (BENADRYL) tablet 50 mg, 50 mg, Oral, 60 Min Pre-Op, Nu Polanco MD, 50 mg at 08/26/19 1000    ezetimibe (ZETIA) tablet 10 mg, 10 mg, Oral, Daily, Lilo Saenz MD, 10 mg at 08/27/19 0828    fluticasone (FLONASE) 50 mcg/act nasal spray 2 spray, 2 spray, Nasal, Daily PRN, Lilo Saenz MD, 2 spray at 08/27/19 0828    insulin lispro (HumaLOG) 100 units/mL subcutaneous injection 1-6 Units, 1-6 Units, Subcutaneous, TID AC, 1 Units at 08/27/19 0830 **AND** Fingerstick Glucose (POCT), , , TID AC, Lilo Saenz MD    LORazepam (ATIVAN) 2 mg/mL injection 0 5 mg, 0 5 mg, Intravenous, Q6H PRN, Mar Amin PA-C, 0 5 mg at 08/25/19 2207    losartan (COZAAR) tablet 50 mg, 50 mg, Oral, Daily, Lilo Saenz MD, 50 mg at 08/27/19 9544    metoprolol tartrate (LOPRESSOR) tablet 50 mg, 50 mg, Oral, Q12H [address], Cameron Burgess MD, 50 mg at 08/27/19 0830    morphine injection 2 mg, 2 mg, Intravenous, Q3H PRN, Narcisa Stevenson DO, 2 mg at 08/27/19 0306    nicotine (NICODERM CQ) 21 mg/24 hr TD 24 hr patch 1 patch, 1 patch, Transdermal, Daily, Lilo Saenz MD, 1 patch at 08/27/19 0825    nitroglycerin (NITRO-BID) 2 % TD ointment 1 inch, 1 inch, Topical, TID, Cameron Burgess MD, 1 inch at 08/27/19 0826    nitroglycerin (NITROSTAT) SL tablet 0 4 mg, 0 4 mg, Sublingual, Q5 Min PRN, Lilo Saenz MD, 0 4 mg at 08/26/19 1729    oxyCODONE (ROXICODONE) IR tablet 5 mg, 5 mg, Oral, Q4H PRN, Cameron Burgess MD, 5 mg at 08/27/19 1006    primidone (MYSOLINE) tablet 100 mg, 100 mg, Oral, Q12H [address], Lilo Saenz MD, 100 mg at 08/27/19 0829    ticagrelor (BRILINTA) tablet 90 mg, 90 mg, Oral, Q12H [address], Nu Polanco MD, 90 mg at 08/27/19 7599    This note was completed in part utilizing [employer] Direct Software    Grammatical errors, random word insertions, spelling mistakes, and incomplete sentences may be an occasional consequence of this system secondary to software limitations, ambient noise, and hardware issues  If you have any questions or concerns about the content, text, or information contained within the body of this dictation, please contact the provider for clarification      Ct Copeland DO  8/27/2019 10:15 AM

## 2019-08-27 NOTE — PLAN OF CARE
Problem: Potential for Falls  Goal: Patient will remain free of falls  Description  INTERVENTIONS:  - Assess patient frequently for physical needs  -  Identify cognitive and physical deficits and behaviors that affect risk of falls  -  [place] fall precautions as indicated by assessment   - Educate patient/family on patient safety including physical limitations  - Instruct patient to call for assistance with activity based on assessment  - Modify environment to reduce risk of injury  - Consider OT/PT consult to assist with strengthening/mobility  Outcome: Progressing     Problem: DISCHARGE PLANNING  Goal: Discharge to home or other facility with appropriate resources  Description  INTERVENTIONS:  - Identify barriers to discharge w/patient and caregiver  - Arrange for needed discharge resources and transportation as appropriate  - Identify discharge learning needs (meds, wound care, etc )  - Arrange for interpretive services to assist at discharge as needed  - Refer to Case Management Department for coordinating discharge planning if the patient needs post-hospital services based on physician/advanced practitioner order or complex needs related to functional status, cognitive ability, or social support system  Outcome: Progressing     Problem: Knowledge Deficit  Goal: Patient/family/caregiver demonstrates understanding of disease process, treatment plan, medications, and discharge instructions  Description  Complete learning assessment and assess knowledge base    Interventions:  - Provide teaching at level of understanding  - Provide teaching via preferred learning methods  Outcome: Progressing     Problem: PAIN - ADULT  Goal: Verbalizes/displays adequate comfort level or baseline comfort level  Description  Interventions:  - Encourage patient to monitor pain and request assistance  - Assess pain using appropriate pain scale  - Administer analgesics based on type and severity of pain and evaluate response  - Implement non-pharmacological measures as appropriate and evaluate response  - Consider cultural and social influences on pain and pain management  - Notify physician/advanced practitioner if interventions unsuccessful or patient reports new pain  Outcome: Progressing     Problem: INFECTION - ADULT  Goal: Absence or prevention of progression during hospitalization  Description  INTERVENTIONS:  - Assess and monitor for signs and symptoms of infection  - Monitor lab/diagnostic results  - Monitor all insertion sites, i e  indwelling lines, tubes, and drains  - Monitor endotracheal if appropriate and nasal secretions for changes in amount and color  - [place] appropriate cooling/warming therapies per order  - Administer medications as ordered  - Instruct and encourage patient and family to use good hand hygiene technique  - Identify and instruct in appropriate isolation precautions for identified infection/condition  Outcome: Progressing  Goal: Absence of fever/infection during neutropenic period  Description  INTERVENTIONS:  - Monitor WBC    Outcome: Progressing     Problem: SAFETY ADULT  Goal: Maintain or return to baseline ADL function  Description  INTERVENTIONS:  -  Assess patient's ability to carry out ADLs; assess patient's baseline for ADL function and identify physical deficits which impact ability to perform ADLs (bathing, care of mouth/teeth, toileting, grooming, dressing, etc )  - Assess/evaluate cause of self-care deficits   - Assess range of motion  - Assess patient's mobility; develop plan if impaired  - Assess patient's need for assistive devices and provide as appropriate  - Encourage maximum independence but intervene and supervise when necessary  - Involve family in performance of ADLs  - Assess for home care needs following discharge   - Consider OT consult to assist with ADL evaluation and planning for discharge  - Provide patient education as appropriate  Outcome: Progressing  Goal: Maintain or return mobility status to optimal level  Description  INTERVENTIONS:  - Assess patient's baseline mobility status (ambulation, transfers, stairs, etc )    - Identify cognitive and physical deficits and behaviors that affect mobility  - Identify mobility aids required to assist with transfers and/or ambulation (gait belt, sit-to-stand, lift, walker, cane, etc )  - [place] fall precautions as indicated by assessment  - Record patient progress and toleration of activity level on Mobility SBAR; progress patient to next Phase/Stage  - Instruct patient to call for assistance with activity based on assessment  - Consider rehabilitation consult to assist with strengthening/weightbearing, etc   Outcome: Progressing     Problem: CARDIOVASCULAR - ADULT  Goal: Maintains optimal cardiac output and hemodynamic stability  Description  INTERVENTIONS:  - Monitor I/O, vital signs and rhythm  - Monitor for S/S and trends of decreased cardiac output  - Administer and titrate ordered vasoactive medications to optimize hemodynamic stability  - Assess quality of pulses, skin color and temperature  - Assess for signs of decreased coronary artery perfusion  - Instruct patient to report change in severity of symptoms  Outcome: Progressing  Goal: Absence of cardiac dysrhythmias or at baseline rhythm  Description  INTERVENTIONS:  - Continuous cardiac monitoring, vital signs, obtain 12 lead EKG if ordered  - Administer antiarrhythmic and heart rate control medications as ordered  - Monitor electrolytes and administer replacement therapy as ordered  Outcome: Progressing

## 2019-08-27 NOTE — NURSING NOTE
Repeat troponin this morning resulted at 8 0  On call cardiologist, Dr Adrián Waggoner, contacted via [email or web address] with results  Awaiting reply

## 2019-08-28 VITALS
RESPIRATION RATE: 18 BRPM | SYSTOLIC BLOOD PRESSURE: 112 MMHG | BODY MASS INDEX: 33.34 KG/M2 | WEIGHT: 232.37 LBS | TEMPERATURE: 97.6 F | DIASTOLIC BLOOD PRESSURE: 78 MMHG | OXYGEN SATURATION: 96 % | HEART RATE: 80 BPM

## 2019-08-28 LAB
ATRIAL RATE: 58 BPM
ATRIAL RATE: 78 BPM
GLUCOSE SERPL-MCNC: 127 MG/DL (ref 65–140)
GLUCOSE SERPL-MCNC: 139 MG/DL (ref 65–140)
P AXIS: 68 DEGREES
P AXIS: 70 DEGREES
PR INTERVAL: 154 MS
PR INTERVAL: 172 MS
QRS AXIS: 19 DEGREES
QRS AXIS: 27 DEGREES
QRSD INTERVAL: 86 MS
QRSD INTERVAL: 98 MS
QT INTERVAL: 366 MS
QT INTERVAL: 426 MS
QTC INTERVAL: 417 MS
QTC INTERVAL: 418 MS
T WAVE AXIS: 66 DEGREES
T WAVE AXIS: 69 DEGREES
VENTRICULAR RATE: 58 BPM
VENTRICULAR RATE: 78 BPM

## 2019-08-28 PROCEDURE — 93005 ELECTROCARDIOGRAM TRACING: CPT

## 2019-08-28 PROCEDURE — 82948 REAGENT STRIP/BLOOD GLUCOSE: CPT

## 2019-08-28 PROCEDURE — 99239 HOSP IP/OBS DSCHRG MGMT >30: CPT | Performed by: PHYSICIAN ASSISTANT

## 2019-08-28 PROCEDURE — 99232 SBSQ HOSP IP/OBS MODERATE 35: CPT

## 2019-08-28 PROCEDURE — 93010 ELECTROCARDIOGRAM REPORT: CPT | Performed by: INTERNAL MEDICINE

## 2019-08-28 RX ORDER — ROSUVASTATIN CALCIUM 40 MG/1
40 TABLET, COATED ORAL DAILY
Qty: 30 TABLET | Refills: 0 | Status: SHIPPED | OUTPATIENT
Start: 2019-08-28 | End: 2019-09-27 | Stop reason: SDUPTHER

## 2019-08-28 RX ORDER — METOPROLOL TARTRATE 50 MG/1
50 TABLET, FILM COATED ORAL EVERY 12 HOURS SCHEDULED
Qty: 60 TABLET | Refills: 0 | Status: SHIPPED | OUTPATIENT
Start: 2019-08-28 | End: 2019-09-27 | Stop reason: SDUPTHER

## 2019-08-28 RX ORDER — NICOTINE 21 MG/24HR
1 PATCH, TRANSDERMAL 24 HOURS TRANSDERMAL DAILY
Qty: 30 PATCH | Refills: 0 | Status: SHIPPED | OUTPATIENT
Start: 2019-08-29 | End: 2019-10-17

## 2019-08-28 RX ORDER — LOSARTAN POTASSIUM 50 MG/1
50 TABLET ORAL DAILY
Qty: 30 TABLET | Refills: 0 | Status: SHIPPED | OUTPATIENT
Start: 2019-08-28 | End: 2019-09-09 | Stop reason: DRUGHIGH

## 2019-08-28 RX ORDER — NITROGLYCERIN 0.4 MG/1
0.4 TABLET SUBLINGUAL
Qty: 30 TABLET | Refills: 0 | Status: SHIPPED | OUTPATIENT
Start: 2019-08-28 | End: 2021-06-30 | Stop reason: SDUPTHER

## 2019-08-28 RX ORDER — ASPIRIN 81 MG/1
81 TABLET ORAL DAILY
Qty: 30 TABLET | Refills: 0 | Status: SHIPPED | OUTPATIENT
Start: 2019-08-29 | End: 2019-09-27 | Stop reason: SDUPTHER

## 2019-08-28 RX ADMIN — LOSARTAN POTASSIUM 50 MG: 50 TABLET ORAL at 09:20

## 2019-08-28 RX ADMIN — EZETIMIBE 10 MG: 10 TABLET ORAL at 09:20

## 2019-08-28 RX ADMIN — TICAGRELOR 90 MG: 90 TABLET ORAL at 09:21

## 2019-08-28 RX ADMIN — PRIMIDONE 100 MG: 50 TABLET ORAL at 09:21

## 2019-08-28 RX ADMIN — AMLODIPINE BESYLATE 5 MG: 5 TABLET ORAL at 09:20

## 2019-08-28 RX ADMIN — NICOTINE 1 PATCH: 21 PATCH TRANSDERMAL at 09:22

## 2019-08-28 RX ADMIN — ASPIRIN 81 MG: 81 TABLET, COATED ORAL at 09:20

## 2019-08-28 RX ADMIN — METOPROLOL TARTRATE 50 MG: 50 TABLET, FILM COATED ORAL at 09:20

## 2019-08-28 NOTE — PROGRESS NOTES
Progress Note - Cardiology   Ari Agustin 60 y o  male MRN: [id number]  Unit/Bed#: E4 -01 Encounter: [id number]      Assessment/Recommendations/Discussion:   1  Non ST-elevation myocardial infarction   -status post cardiac catheterization with 80-90% very long mid LAD lesion status post PCI with 2 drug-eluting stents in the mid LAD - 2 75 x 38 distal, 2 75 x 33 mm proximal   RCA and left circ were patent  2  Hypertension  3  Diabetes mellitus  4  Hyperlipidemia    PLAN  Pain much better than yesterday  ECG is markedly improved, no signs of acute ischemia  Continue his current medications, aspirin, Brilinta  Continue Lipitor 80 mg  Zetia 10 mg  Losartan 50 mg, Toprol 50 mg  He must remain a nonsmoker from this point indefinitely  Would discharge with prescription for sublingual nitro    May follow up with me or Dr Mckayla Sneed in 4 weeks  His echo showed ejection fraction estimated at 60%  Trace mitral regurgitation  Trace tricuspid regurgitation    Subjective:   HPI  Chest pain is much improved from yesterday  Likely due to small side branch occlusion  ECG markedly improved  Review of Systems: As noted in HPI  Rest of ROS is negative  Vitals:   /78 (BP Location: Left arm)   Pulse 80   Temp 97 6 °F (36 4 °C) (Tympanic)   Resp 18   Wt 105 kg (232 lb 5 8 oz)   SpO2 96%   BMI 33 34 kg/m²   Vitals:    08/24/19 1143   Weight: 105 kg (232 lb 5 8 oz)     No intake or output data in the 24 hours ending 08/28/19 0920    Physical Exam:  General appearance: alert and oriented, in no acute distress  Head: Normocephalic, without obvious abnormality, atraumatic  Eyes: conjunctivae/corneas clear  Anicteric    Neck: no adenopathy, no carotid bruit, no JVD, supple, symmetrical, trachea midline and thyroid not enlarged, no tenderness  Lungs: clear to auscultation bilaterally  Heart: regular rate and rhythm, S1, S2 normal, no murmur, click, rub or gallop  Abdomen: soft, non-tender; bowel sounds normal; no masses,  no organomegaly  Extremities: extremities normal, warm and well-perfused; no cyanosis, clubbing, or edema  Skin: Skin color, texture, turgor normal  No rashes or lesions     Lab Results:  Results from last 7 days   Lab Units 08/27/19  0550   WBC Thousand/uL 14 34*   HEMOGLOBIN g/dL 13 1   HEMATOCRIT % 38 6   PLATELETS Thousands/uL 205     Results from last 7 days   Lab Units 08/27/19  0550  08/24/19  1147   POTASSIUM mmol/L 4 0   < > 5 8*   CHLORIDE mmol/L 103   < > 106   CO2 mmol/L 26   < > 27   BUN mg/dL 15   < > 13   CREATININE mg/dL 0 85   < > 0 76   CALCIUM mg/dL 9 1   < > 8 9   ALK PHOS U/L  --   --  65   ALT U/L  --   --  26   AST U/L  --   --  48*    < > = values in this interval not displayed       Results from last 7 days   Lab Units 08/27/19  0550   POTASSIUM mmol/L 4 0   CHLORIDE mmol/L 103   CO2 mmol/L 26   BUN mg/dL 15   CREATININE mg/dL 0 85   CALCIUM mg/dL 9 1           Medications:    Current Facility-Administered Medications:     acetaminophen (TYLENOL) tablet 650 mg, 650 mg, Oral, Q4H PRN, Ramy Mckenna MD, 650 mg at 08/26/19 1818    albuterol (PROVENTIL HFA,VENTOLIN HFA) inhaler 2 puff, 2 puff, Inhalation, Q4H PRN, Ramy Mckenna MD    amLODIPine (NORVASC) tablet 5 mg, 5 mg, Oral, Daily, Ilda Hines MD, 5 mg at 08/27/19 7502    aspirin (ECOTRIN LOW STRENGTH) EC tablet 81 mg, 81 mg, Oral, Daily, Ilda Hines MD, 81 mg at 08/27/19 0830    atorvastatin (LIPITOR) tablet 80 mg, 80 mg, Oral, Daily With Aleksandra Wing DO, 80 mg at 08/27/19 1647    calcium carbonate (TUMS) chewable tablet 500 mg, 500 mg, Oral, Daily PRN, Farideh Gregorio PA-C, 500 mg at 08/27/19 2021    diphenhydrAMINE (BENADRYL) tablet 50 mg, 50 mg, Oral, 60 Min Pre-Op, Ilda Hines MD, 50 mg at 08/26/19 1000    ezetimibe (ZETIA) tablet 10 mg, 10 mg, Oral, Daily, Ramy Mckenna MD, 10 mg at 08/27/19 0828    fluticasone (FLONASE) 50 mcg/act nasal spray 2 spray, 2 spray, Nasal, Daily PRN, Janette Moffett MD, 2 spray at 08/27/19 0828    insulin lispro (HumaLOG) 100 units/mL subcutaneous injection 1-6 Units, 1-6 Units, Subcutaneous, TID AC, 2 Units at 08/27/19 1142 **AND** Fingerstick Glucose (POCT), , , TID AC, Janette Moffett MD    LORazepam (ATIVAN) 2 mg/mL injection 0 5 mg, 0 5 mg, Intravenous, Q6H PRN, Mar Amin PA-C, 0 5 mg at 08/25/19 2207    losartan (COZAAR) tablet 50 mg, 50 mg, Oral, Daily, Janette Moffett MD, 50 mg at 08/27/19 4005    metoprolol tartrate (LOPRESSOR) tablet 50 mg, 50 mg, Oral, Q12H [facility], Lydia Luna MD, 50 mg at 08/27/19 2020    morphine injection 2 mg, 2 mg, Intravenous, Q3H PRN, Darrall Lipslindsayutz, DO, 2 mg at 08/27/19 0306    nicotine (NICODERM CQ) 21 mg/24 hr TD 24 hr patch 1 patch, 1 patch, Transdermal, Daily, Janette Mofeftt MD, 1 patch at 08/27/19 0825    nitroglycerin (NITRO-BID) 2 % TD ointment 1 inch, 1 inch, Topical, TID, Lydia Luna MD, 1 inch at 08/27/19 2022    nitroglycerin (NITROSTAT) SL tablet 0 4 mg, 0 4 mg, Sublingual, Q5 Min PRN, Janette Moffett MD, 0 4 mg at 08/26/19 1729    oxyCODONE (ROXICODONE) IR tablet 5 mg, 5 mg, Oral, Q4H PRN, Lydia Luna MD, 5 mg at 08/27/19 1006    primidone (MYSOLINE) tablet 100 mg, 100 mg, Oral, Q12H [facility], Janette Moffett MD, 100 mg at 08/27/19 2020    ticagrelor (BRILINTA) tablet 90 mg, 90 mg, Oral, Q12H [facility], Adrianna Acevedo MD, 90 mg at 08/27/19 2020    This note was completed in part utilizing [employer] Direct Software  Grammatical errors, random word insertions, spelling mistakes, and incomplete sentences may be an occasional consequence of this system secondary to software limitations, ambient noise, and hardware issues  If you have any questions or concerns about the content, text, or information contained within the body of this dictation, please contact the provider for clarification      Christian Daigle DO  8/28/2019 9:20 AM

## 2019-08-28 NOTE — ASSESSMENT & PLAN NOTE
Lab Results   Component Value Date    HGBA1C 6 4 (H) 08/24/2019       Recent Labs     08/27/19  1123 08/27/19  1614 08/28/19  0734 08/28/19  1113   POCGLU 190* 97 127 139       Blood Sugar Average: Last 72 hrs:  (P) 502 [id number]   - Optimally controlled BG  - sugars controlled on insulin sliding scale while inpatient  - patient may resume his outpatient oral medications glimepiride and metformin upon DC

## 2019-08-28 NOTE — ASSESSMENT & PLAN NOTE
Pre-hospital on Crestor 10 mg daily and Zetia 10 mg daily    Crestor 10 mg daily increased--> 40 mg daily

## 2019-08-28 NOTE — ASSESSMENT & PLAN NOTE
Home regimen of losartan 25 mg daily  Additional agents were added in the setting of NSTEMI  Losartan increased from 25 mg daily--> 50 mg daily  Metoprolol tartrate 50 mg twice daily--> new

## 2019-08-28 NOTE — DISCHARGE SUMMARY
Discharge- Cely Navarro [birth year], 60 y o  male MRN: [id number]    Unit/Bed#: E4 -01 Encounter: [id number]    Primary Care Provider: Chela Guzman DO   Date and time admitted to hospital: 8/24/2019 11:36 AM        Discharge date: 8/28/19      * NSTEMI (non-ST elevated myocardial infarction) [facility])  Assessment & Plan  NSTEMI s/p cardiac catheterization 8/26/19 and PCI with stents x2 to the LAD 80-90% occlusion  Meds per cardiology are as follows- Confirmed with Dr Ramonita Porter  Aspirin 81 - new   Brilinta 90 mg b i d  - new    Metoprolol tartrate 50 mg b i d  - new   Losartan 25 mg--> 50 mg daily - increaseed   Crestor 20 mg-->40 mg - increased   Zetia 10 mg daily - continued on  - elevated Troponin to 8 0 after cath,   - EKG showed TWI in the anterolateral leads since admission however unchanged  - patient's chest pain character has not changed, less intense than before however still persisted  - ECHO with normal EF  - no significant arrhthymias on Telemonitor  - patient was kept an additional day to monitor post cardiac catheterization chest pain  - this has improved and patient is stable for discharge with the medications listed above  Essential tremor  Assessment & Plan  Continue primidone    Tobacco abuse  Assessment & Plan  Was a current smoker  Advised to quit indefinitely  Prescribed nicotine patches upon discharge    Type 2 diabetes mellitus [facility])  Assessment & Plan  Lab Results   Component Value Date    HGBA1C 6 4 (H) 08/24/2019       Recent Labs     08/27/19  1123 08/27/19  1614 08/28/19  0734 08/28/19  1113   POCGLU 190* 97 127 139       Blood Sugar Average: Last 72 hrs:  (P) 826 [id number]   - Optimally controlled BG  - sugars controlled on insulin sliding scale while inpatient  - patient may resume his outpatient oral medications glimepiride and metformin upon DC    Mixed hyperlipidemia  Assessment & Plan  Pre-hospital on Crestor 10 mg daily and Zetia 10 mg daily    Crestor 10 mg daily increased--> 40 mg daily    HTN (hypertension)  Assessment & Plan  Home regimen of losartan 25 mg daily  Additional agents were added in the setting of NSTEMI  Losartan increased from 25 mg daily--> 50 mg daily  Metoprolol tartrate 50 mg twice daily--> new        Consultations During Hospital Stay:  · Cardiology-Dr Lc Fried    Procedures Performed:   · 8/24/19:  Chest x-ray:  No acute cardiopulmonary disease    · 8/26/19:  Echocardiogram: Technical quality: Fair but adequate  Cardiac rhythm: Sinus with bundle branch block  1  Mild concentric left ventricular hypertrophy, normal left ventricular systolic and diastolic function, ejection fraction estimated around 60%  2  No other significant chamber hypertrophy or enlargement  3  Aortic valve sclerosis with some focal calcification  No aortic valve stenosis or regurgitation  4  Mitral valve sclerosis and calcification, trace mitral valve regurgitation  5  Trace tricuspid valve regurgitation  6  No obvious pulmonary hypertension  7  No pericardial effusion  No previous echocardiogram is available for comparison  · 8/26/19:  Cardiac catheterization: CORONARY VESSELS:     --  Left main: Normal   --  LAD: The vessel was medium sized  --  Mid LAD: There was a diffuse 80 % stenosis  The lesion was without evidence of thrombus  There was DENNY grade 3 flow through the vessel (brisk flow)  This is a likely culprit for the patient's clinical presentation  An intervention was performed  --  1st diagonal: The vessel was small sized  --  2nd diagonal: The vessel was small sized  --  Circumflex: The vessel was large sized  Angiography showed minor luminal irregularities  --  1st obtuse marginal: The vessel was medium to large sized  --  RCA: The vessel was large sized (dominant)  Angiography showed mild atherosclerosis  --  PDA septal:     RECOMMENDATIONS  1  ASA 81 mg daily  2  Brilinta 90 mg BID  3   Potent statin therapy    Significant Findings / Test Results:   · NSTEMI requiring cardiac catheterization status post PCI and 2 stents to LAD    Incidental Findings:   · None    Test Results Pending at Discharge (will require follow up): · None     Outpatient follow-up Requested:  · Dr CorleyIfsk-[id number]-0 weeks    Complications:  None    Hospital Course:     Sea Elias is a 60 y o  male patient who originally presented to the hospital on 8/24/2019 due to a pressure-like sensation that developed in his chest   Initially went to urgent care and advised to come to the hospital for further workup and evaluation  Patient was found and NSTEMI and taken to the cardiac cath lab  Two stents were placed to the LAD and his medications were adjusted  Please see above for detailed course of hospitalization  In conclusion, patient is stable for discharge at this time  Condition at Discharge: stable     Discharge Day Visit / Exam:     Subjective:  Standing and hallway upon arrival   Patient feels well today  He reports his chest pain has resolved  He is eager for discharge at this time  Discussed medication changes listed above  Understands need to quit smoking  Understands need to continue medications as prescribed and to not miss doses  Will follow up with Cardiology-Dr Zelda Oliveira in 4 weeks time  Instructed to follow up with Dr Zelda Oliveira regarding when able to return to work/fly as he does do frequent traveling in terms of his job  Internal referral sent for cardiac rehab  Vitals: Blood Pressure: 112/78 (08/28/19 0732)  Pulse: 80 (08/28/19 0732)  Temperature: 97 6 °F (36 4 °C) (08/28/19 0732)  Temp Source: Tympanic (08/28/19 0732)  Respirations: 18 (08/28/19 0732)  Weight - Scale: 105 kg (232 lb 5 8 oz) (08/24/19 1143)  SpO2: 96 % (08/28/19 0732)  Exam:   Physical Exam   Constitutional: He is oriented to person, place, and time  He appears well-developed and well-nourished  HENT:   Head: Normocephalic and atraumatic     Cardiovascular: Normal rate, regular rhythm and normal heart sounds  Pulmonary/Chest: Effort normal and breath sounds normal  No stridor  No respiratory distress  He has no wheezes  He has no rales  He exhibits no tenderness  Abdominal: Soft  Bowel sounds are normal  He exhibits no distension and no mass  There is no tenderness  There is no rebound and no guarding  No hernia  Neurological: He is alert and oriented to person, place, and time  Skin: Skin is warm and dry  Nursing note and vitals reviewed  Discussion with Family: daughter at bedside    Discharge instructions/Information to patient and family:   See after visit summary for information provided to patient and family  Provisions for Follow-Up Care:  See after visit summary for information related to follow-up care and any pertinent home health orders  Planned Readmission: no     Discharge Statement:  I spent 49 minutes discharging the patient  This time was spent on the day of discharge  I had direct contact with the patient on the day of discharge  Greater than 50% of the total time was spent examining patient, answering all patient questions, arranging and discussing plan of care with patient as well as directly providing post-discharge instructions  Additional time then spent on discharge activities  Discharge Medications:  See after visit summary for reconciled discharge medications provided to patient and family        ** Please Note: This note has been constructed using a voice recognition system **

## 2019-08-28 NOTE — PLAN OF CARE
Problem: Potential for Falls  Goal: Patient will remain free of falls  Description  INTERVENTIONS:  - Assess patient frequently for physical needs  -  Identify cognitive and physical deficits and behaviors that affect risk of falls  -  [place] fall precautions as indicated by assessment   - Educate patient/family on patient safety including physical limitations  - Instruct patient to call for assistance with activity based on assessment  - Modify environment to reduce risk of injury  - Consider OT/PT consult to assist with strengthening/mobility  Outcome: Progressing     Problem: DISCHARGE PLANNING  Goal: Discharge to home or other facility with appropriate resources  Description  INTERVENTIONS:  - Identify barriers to discharge w/patient and caregiver  - Arrange for needed discharge resources and transportation as appropriate  - Identify discharge learning needs (meds, wound care, etc )  - Arrange for interpretive services to assist at discharge as needed  - Refer to Case Management Department for coordinating discharge planning if the patient needs post-hospital services based on physician/advanced practitioner order or complex needs related to functional status, cognitive ability, or social support system  Outcome: Progressing     Problem: Knowledge Deficit  Goal: Patient/family/caregiver demonstrates understanding of disease process, treatment plan, medications, and discharge instructions  Description  Complete learning assessment and assess knowledge base    Interventions:  - Provide teaching at level of understanding  - Provide teaching via preferred learning methods  Outcome: Progressing     Problem: PAIN - ADULT  Goal: Verbalizes/displays adequate comfort level or baseline comfort level  Description  Interventions:  - Encourage patient to monitor pain and request assistance  - Assess pain using appropriate pain scale  - Administer analgesics based on type and severity of pain and evaluate response  - Implement non-pharmacological measures as appropriate and evaluate response  - Consider cultural and social influences on pain and pain management  - Notify physician/advanced practitioner if interventions unsuccessful or patient reports new pain  Outcome: Progressing     Problem: INFECTION - ADULT  Goal: Absence or prevention of progression during hospitalization  Description  INTERVENTIONS:  - Assess and monitor for signs and symptoms of infection  - Monitor lab/diagnostic results  - Monitor all insertion sites, i e  indwelling lines, tubes, and drains  - Monitor endotracheal if appropriate and nasal secretions for changes in amount and color  - [place] appropriate cooling/warming therapies per order  - Administer medications as ordered  - Instruct and encourage patient and family to use good hand hygiene technique  - Identify and instruct in appropriate isolation precautions for identified infection/condition  Outcome: Progressing  Goal: Absence of fever/infection during neutropenic period  Description  INTERVENTIONS:  - Monitor WBC    Outcome: Progressing     Problem: SAFETY ADULT  Goal: Maintain or return to baseline ADL function  Description  INTERVENTIONS:  -  Assess patient's ability to carry out ADLs; assess patient's baseline for ADL function and identify physical deficits which impact ability to perform ADLs (bathing, care of mouth/teeth, toileting, grooming, dressing, etc )  - Assess/evaluate cause of self-care deficits   - Assess range of motion  - Assess patient's mobility; develop plan if impaired  - Assess patient's need for assistive devices and provide as appropriate  - Encourage maximum independence but intervene and supervise when necessary  - Involve family in performance of ADLs  - Assess for home care needs following discharge   - Consider OT consult to assist with ADL evaluation and planning for discharge  - Provide patient education as appropriate  Outcome: Progressing  Goal: Maintain or return mobility status to optimal level  Description  INTERVENTIONS:  - Assess patient's baseline mobility status (ambulation, transfers, stairs, etc )    - Identify cognitive and physical deficits and behaviors that affect mobility  - Identify mobility aids required to assist with transfers and/or ambulation (gait belt, sit-to-stand, lift, walker, cane, etc )  - [place] fall precautions as indicated by assessment  - Record patient progress and toleration of activity level on Mobility SBAR; progress patient to next Phase/Stage  - Instruct patient to call for assistance with activity based on assessment  - Consider rehabilitation consult to assist with strengthening/weightbearing, etc   Outcome: Progressing     Problem: CARDIOVASCULAR - ADULT  Goal: Maintains optimal cardiac output and hemodynamic stability  Description  INTERVENTIONS:  - Monitor I/O, vital signs and rhythm  - Monitor for S/S and trends of decreased cardiac output  - Administer and titrate ordered vasoactive medications to optimize hemodynamic stability  - Assess quality of pulses, skin color and temperature  - Assess for signs of decreased coronary artery perfusion  - Instruct patient to report change in severity of symptoms  Outcome: Progressing  Goal: Absence of cardiac dysrhythmias or at baseline rhythm  Description  INTERVENTIONS:  - Continuous cardiac monitoring, vital signs, obtain 12 lead EKG if ordered  - Administer antiarrhythmic and heart rate control medications as ordered  - Monitor electrolytes and administer replacement therapy as ordered  Outcome: Progressing

## 2019-08-28 NOTE — ASSESSMENT & PLAN NOTE
NSTEMI s/p cardiac catheterization 8/26/19 and PCI with stents x2 to the LAD 80-90% occlusion  Meds per cardiology are as follows- Confirmed with Dr Arsenio Ash  Aspirin 81 - new   Brilinta 90 mg b i d  - new    Metoprolol tartrate 50 mg b i d  - new   Losartan 25 mg--> 50 mg daily - increaseed   Crestor 20 mg-->40 mg - increased   Zetia 10 mg daily - continued on  - elevated Troponin to 8 0 after cath,   - EKG showed TWI in the anterolateral leads since admission however unchanged  - patient's chest pain character has not changed, less intense than before however still persisted  - ECHO with normal EF  - no significant arrhthymias on Telemonitor  - patient was kept an additional day to monitor post cardiac catheterization chest pain  - this has improved and patient is stable for discharge with the medications listed above

## 2019-08-29 ENCOUNTER — TRANSITIONAL CARE MANAGEMENT (OUTPATIENT)
Dept: FAMILY MEDICINE CLINIC | Facility: CLINIC | Age: 59
End: 2019-08-29

## 2019-08-30 ENCOUNTER — OFFICE VISIT (OUTPATIENT)
Dept: FAMILY MEDICINE CLINIC | Facility: CLINIC | Age: 59
End: 2019-08-30
Payer: COMMERCIAL

## 2019-08-30 VITALS
DIASTOLIC BLOOD PRESSURE: 64 MMHG | SYSTOLIC BLOOD PRESSURE: 110 MMHG | WEIGHT: 218 LBS | HEART RATE: 90 BPM | OXYGEN SATURATION: 96 % | HEIGHT: 70 IN | BODY MASS INDEX: 31.21 KG/M2

## 2019-08-30 DIAGNOSIS — E78.2 MIXED HYPERLIPIDEMIA: ICD-10-CM

## 2019-08-30 DIAGNOSIS — G25.0 ESSENTIAL TREMOR: ICD-10-CM

## 2019-08-30 DIAGNOSIS — Z72.0 TOBACCO ABUSE: ICD-10-CM

## 2019-08-30 DIAGNOSIS — E11.00 TYPE 2 DIABETES MELLITUS WITH HYPEROSMOLARITY WITHOUT COMA, WITHOUT LONG-TERM CURRENT USE OF INSULIN (HCC): ICD-10-CM

## 2019-08-30 DIAGNOSIS — I21.4 NSTEMI (NON-ST ELEVATED MYOCARDIAL INFARCTION) (HCC): Primary | ICD-10-CM

## 2019-08-30 DIAGNOSIS — I10 ESSENTIAL HYPERTENSION: ICD-10-CM

## 2019-08-30 PROCEDURE — 99496 TRANSJ CARE MGMT HIGH F2F 7D: CPT | Performed by: FAMILY MEDICINE

## 2019-08-30 NOTE — PROGRESS NOTES
Assessment/Plan:  Patient has not smoked since hospitalization  Patient is slowly improving overall  Patient follow-up Cardiology  Patient will continue with current listing of medications  Patient will not use Viagra  Patient may need to start cardiac rehab  Follow-up in 2 months       Diagnoses and all orders for this visit:    NSTEMI (non-ST elevated myocardial infarction) ([facility] [address] )    Type 2 diabetes mellitus with hyperosmolarity without coma, without long-term current use of insulin (HCC)    Essential hypertension    Mixed hyperlipidemia    Essential tremor    Tobacco abuse            Subjective:        Patient ID: Felix Anaya is a 60 y o  male  Patient is here status post hospitalization from August 24 through the 28th for non STEMI  Patient had elevated troponin levels  Patient presented with chest pain along with nausea vomiting diaphoresis  Patient was seen at urgent care and was sent by ambulance to the hospital   Patient ultimately had echocardiogram which showed ejection fraction 60% with aortic sclerosis  Patient underwent catheterization which did show 80% lad lesion  Patient with 2 stents placed  Other diagnosis include essential tremor  Patient will continue with primidone  Patient also with history of tobacco use  Patient has not smoked since hospitalization  Diabetes under control with A1c of 6 4  Patient Crestor increased to 40 mg for hyperlipidemia and metoprolol and losartan were started for hypertension  Patient will have follow-up with Cardiology in 4 weeks  Patient will be going in to cardiac rehab  the meds reviewed  The following portions of the patient's history were reviewed and updated as appropriate: allergies, current medications, past family history, past medical history, past social history, past surgical history and problem list       Review of Systems   Constitutional: Negative  HENT: Negative  Eyes: Negative  Respiratory: Negative  Cardiovascular: Positive for chest pain  Gastrointestinal: Negative  Endocrine: Negative  Genitourinary: Negative  Musculoskeletal: Negative  Skin: Negative  Allergic/Immunologic: Negative  Neurological: Negative  Hematological: Negative  Psychiatric/Behavioral: Negative  Objective:      BMI Counseling: Body mass index is 31 28 kg/m²  Discussed the patient's BMI with him  The BMI is above average  BMI counseling and education was provided to the patient  Nutrition recommendations include reducing portion sizes  /64 (BP Location: Right arm)   Pulse 90   Ht 5' 10" (1 778 m)   Wt 98 9 kg (218 lb)   SpO2 96%   BMI 31 28 kg/m²          Physical Exam   Constitutional: He appears well-developed and well-nourished  No distress  HENT:   Head: Normocephalic  Right Ear: External ear normal    Left Ear: External ear normal    Mouth/Throat: Oropharynx is clear and moist  No oropharyngeal exudate  Eyes: Pupils are equal, round, and reactive to light  EOM are normal  Right eye exhibits no discharge  Left eye exhibits no discharge  No scleral icterus  Neck: Normal range of motion  Neck supple  No thyromegaly present  Cardiovascular: Normal rate, regular rhythm, normal heart sounds and intact distal pulses  Exam reveals no gallop and no friction rub  No murmur heard  Pulmonary/Chest: Effort normal and breath sounds normal  No respiratory distress  He has no wheezes  He has no rales  He exhibits no tenderness  Abdominal: Soft  Bowel sounds are normal  He exhibits no distension  There is no tenderness  There is no rebound and no guarding  Musculoskeletal: Normal range of motion  He exhibits no edema or tenderness  Lymphadenopathy:     He has no cervical adenopathy  Neurological: He is alert  No cranial nerve deficit  He exhibits normal muscle tone  Coordination normal    Skin: Skin is warm and dry  No rash noted  He is not diaphoretic  No erythema  No pallor  Psychiatric: He has a normal mood and affect  His behavior is normal  Judgment and thought content normal    Nursing note and vitals reviewed

## 2019-09-09 ENCOUNTER — OFFICE VISIT (OUTPATIENT)
Dept: CARDIOLOGY CLINIC | Facility: CLINIC | Age: 59
End: 2019-09-09
Payer: COMMERCIAL

## 2019-09-09 VITALS
HEART RATE: 84 BPM | WEIGHT: 218.8 LBS | HEIGHT: 73 IN | SYSTOLIC BLOOD PRESSURE: 98 MMHG | BODY MASS INDEX: 29 KG/M2 | DIASTOLIC BLOOD PRESSURE: 60 MMHG | OXYGEN SATURATION: 98 %

## 2019-09-09 DIAGNOSIS — E11.9 TYPE 2 DIABETES MELLITUS WITHOUT COMPLICATION, UNSPECIFIED WHETHER LONG TERM INSULIN USE (HCC): ICD-10-CM

## 2019-09-09 DIAGNOSIS — I10 HYPERTENSION, UNSPECIFIED TYPE: ICD-10-CM

## 2019-09-09 DIAGNOSIS — I21.4 NSTEMI (NON-ST ELEVATED MYOCARDIAL INFARCTION) (HCC): ICD-10-CM

## 2019-09-09 DIAGNOSIS — E78.5 HYPERLIPIDEMIA, UNSPECIFIED HYPERLIPIDEMIA TYPE: ICD-10-CM

## 2019-09-09 DIAGNOSIS — Z72.0 TOBACCO ABUSE: ICD-10-CM

## 2019-09-09 DIAGNOSIS — Z95.5 S/P CORONARY ARTERY STENT PLACEMENT: Primary | ICD-10-CM

## 2019-09-09 PROCEDURE — 99214 OFFICE O/P EST MOD 30 MIN: CPT | Performed by: NURSE PRACTITIONER

## 2019-09-09 RX ORDER — LOSARTAN POTASSIUM 25 MG/1
25 TABLET ORAL DAILY
Qty: 30 TABLET | Refills: 3
Start: 2019-09-09 | End: 2019-09-27 | Stop reason: SDUPTHER

## 2019-09-09 NOTE — PROGRESS NOTES
Cardiology Follow Up    Gilda Escobedo  [birth year]  [id number]  [address] [facility] [address] [facility]  [phone]    Hospital Follow up visit     Interval History:   Mr Brandi Porter was admitted to [address] [address] on 8/24 - 8/28/19 with a NSTEMI  He originally presented to urgent carea nd was instructed to present to the ED  Mr Zafar Caba presented to the ED with complaints of Chest pain associated with diaphoresis and nausea  She was treated with SL NTG x 1 with relief  Troponin 1 53 elevated to 8 0  He was underwent a cardiac catheterization which showed mid LAD 80% stenosis, a successful GLENN x 2 performed on the 80% lesion in mid LAD  Balloon angioplasty perfromed on 100% lesion in posterior septal artery  Following intervention 50% residual stenosis  TTE showed LVEF 60%, no significant valvular disease  He was discharged home on a nicotine patch, Brilinta 90 mg q 12 hours aspirin 81 mg daily, Crestor 40 mg daily  Mr Brandi Porter presents our office for recent hospitalization follow-up visit  He is accompanied by a family member  Kyra Sep is no longer smoking  He admits to chest discomfort that is difficult for him to describe  He admits to right breast discomfort relieved with tylenol PM   He admit to occasional shortness of breath which he relates to his smoking history            DM2 Hgb A1C 6 4  HTN  Tobacco abuse  HLD - 12/15/16 cholesterol 119, triglycerides 86, HDL 50, LDL 52  Patient Active Problem List   Diagnosis    Erectile dysfunction    HTN (hypertension)    Mixed hyperlipidemia    Type 2 diabetes mellitus (HCC)    Tobacco abuse    Bronchitis    Acute non-recurrent frontal sinusitis    Acute non-recurrent maxillary sinusitis    Pharyngitis due to Streptococcus species    Tremor of both hands    NSTEMI (non-ST elevated myocardial infarction) ([facility] [address] [facility] )    Essential tremor     Past Medical History:   Diagnosis Date    Asthma     Coronary artery disease     Diabetes ([facility] [address] )     HTN (hypertension)     Hypercholesteremia     Myocardial infarction [facility])      Social History     Socioeconomic History    Marital status: /Civil Union     Spouse name: Not on file    Number of children: Not on file    Years of education: Not on file    Highest education level: Not on file   Occupational History    Not on file   Social Needs    Financial resource strain: Not on file    Food insecurity:     Worry: Not on file     Inability: Not on file    Transportation needs:     Medical: Not on file     Non-medical: Not on file   Tobacco Use    Smoking status: Former Smoker     Packs/day: 1 00     Years: 40 00     Pack years: 40 00     Types: Cigarettes    Smokeless tobacco: Never Used    Tobacco comment: he has quit several times before   Substance and Sexual Activity    Alcohol use: Never     Frequency: Never    Drug use: Yes     Types: Marijuana    Sexual activity: Not Currently   Lifestyle    Physical activity:     Days per week: Not on file     Minutes per session: Not on file    Stress: Not on file   Relationships    Social connections:     Talks on phone: Not on file     Gets together: Not on file     Attends Denominational service: Not on file     Active member of club or organization: Not on file     Attends meetings of clubs or organizations: Not on file     Relationship status: Not on file    Intimate partner violence:     Fear of current or ex partner: Not on file     Emotionally abused: Not on file     Physically abused: Not on file     Forced sexual activity: Not on file   Other Topics Concern    Not on file   Social History Narrative    Not on file      Family History   Problem Relation Age of Onset    Liver cancer Father     Skin cancer Father     Tremor Mother     Tremor Maternal Grandmother      Past Surgical History:   Procedure Laterality Date  CARDIAC CATHETERIZATION      COLONOSCOPY      RESOLVED: 2003    CORONARY STENT PLACEMENT      MASS EXCISION Right     post auricular cyst removal - in office - Dr Zohra Serrano - 6/10/19    TONSILLECTOMY         Current Outpatient Medications:     albuterol (PROAIR HFA) 90 mcg/act inhaler, Inhale 2 puffs every 4 (four) hours as needed for shortness of breath, Disp: 1 each, Rfl: 0    aspirin (ECOTRIN LOW STRENGTH) 81 mg EC tablet, Take 1 tablet (81 mg total) by mouth daily, Disp: 30 tablet, Rfl: 0    ezetimibe (ZETIA) 10 mg tablet, Take 1 tablet (10 mg total) by mouth daily, Disp: 90 tablet, Rfl: 1    fluticasone (FLONASE) 50 mcg/act nasal spray, 2 sprays into each nostril daily (Patient taking differently: 2 sprays into each nostril daily as needed ), Disp: 1 Bottle, Rfl: 11    glimepiride (AMARYL) 2 mg tablet, Take 1 tablet (2 mg total) by mouth daily, Disp: 90 tablet, Rfl: 1    losartan (COZAAR) 50 mg tablet, Take 1 tablet (50 mg total) by mouth daily, Disp: 30 tablet, Rfl: 0    metFORMIN (GLUCOPHAGE) 1000 MG tablet, Take 1 tablet (1,000 mg total) by mouth 2 (two) times a day, Disp: 180 tablet, Rfl: 1    metoprolol tartrate (LOPRESSOR) 50 mg tablet, Take 1 tablet (50 mg total) by mouth every 12 (twelve) hours, Disp: 60 tablet, Rfl: 0    niacin (NIASPAN) 1000 MG CR tablet, Take 1 tablet (1,000 mg total) by mouth daily at bedtime, Disp: 90 tablet, Rfl: 1    nicotine (NICODERM CQ) 21 mg/24 hr TD 24 hr patch, Place 1 patch on the skin daily, Disp: 30 patch, Rfl: 0    nitroglycerin (NITROSTAT) 0 4 mg SL tablet, Place 1 tablet (0 4 mg total) under the tongue every 5 (five) minutes as needed for chest pain, Disp: 30 tablet, Rfl: 0    primidone (MYSOLINE) 50 mg tablet, Take 2 tablets (100 mg total) by mouth every 12 (twelve) hours, Disp: 120 tablet, Rfl: 3    rosuvastatin (CRESTOR) 40 MG tablet, Take 1 tablet (40 mg total) by mouth daily, Disp: 30 tablet, Rfl: 0    ticagrelor (BRILINTA) 90 MG, Take 1 tablet (90 mg total) by mouth every 12 (twelve) hours, Disp: 60 tablet, Rfl: 0  Allergies   Allergen Reactions    Penicillins Throat Swelling    Shellfish Allergy Throat Swelling       Labs:  Admission on 08/24/2019, Discharged on 08/28/2019   Component Date Value    WBC 08/24/2019 7 40     RBC 08/24/2019 4 27     Hemoglobin 08/24/2019 13 5     Hematocrit 08/24/2019 40 4     MCV 08/24/2019 95     MCH 08/24/2019 31 6     MCHC 08/24/2019 33 4     RDW 08/24/2019 11 9     MPV 08/24/2019 9 9     Platelets 08/96/3901 195     nRBC 08/24/2019 0     Neutrophils Relative 08/24/2019 56     Immat GRANS % 08/24/2019 0     Lymphocytes Relative 08/24/2019 30     Monocytes Relative 08/24/2019 8     Eosinophils Relative 08/24/2019 5     Basophils Relative 08/24/2019 1     Neutrophils Absolute 08/24/2019 4 14     Immature Grans Absolute 08/24/2019 0 03     Lymphocytes Absolute 08/24/2019 2 20     Monocytes Absolute 08/24/2019 0 58     Eosinophils Absolute 08/24/2019 0 37     Basophils Absolute 08/24/2019 0 08     Sodium 08/24/2019 138     Potassium 08/24/2019 5 8*    Chloride 08/24/2019 106     CO2 08/24/2019 27     ANION GAP 08/24/2019 5     BUN 08/24/2019 13     Creatinine 08/24/2019 0 76     Glucose 08/24/2019 130     Calcium 08/24/2019 8 9     AST 08/24/2019 48*    ALT 08/24/2019 26     Alkaline Phosphatase 08/24/2019 65     Total Protein 08/24/2019 7 6     Albumin 08/24/2019 3 4*    Total Bilirubin 08/24/2019 0 79     eGFR 08/24/2019 99     Troponin I 08/24/2019 0 39*    Potassium 08/24/2019 4 3     Protime 08/24/2019 13 6     INR 08/24/2019 1 03     PTT 08/24/2019 39*    Troponin I 08/24/2019 0 78*    Hemoglobin A1C 08/24/2019 6 4*    EAG 08/24/2019 137     Hepatitis C Ab 08/24/2019 Non-reactive     POC Glucose 08/24/2019 99     PTT 08/24/2019 52*    Troponin I 08/24/2019 1 22*    POC Glucose 08/24/2019 189*    PTT 08/25/2019 59*    WBC 08/25/2019 10 78*    RBC 08/25/2019 4 24     Hemoglobin 08/25/2019 13 4     Hematocrit 08/25/2019 39 7     MCV 08/25/2019 94     MCH 08/25/2019 31 6     MCHC 08/25/2019 33 8     RDW 08/25/2019 11 7     MPV 08/25/2019 9 7     Platelets 50/81/4127 191     nRBC 08/25/2019 0     Neutrophils Relative 08/25/2019 63     Immat GRANS % 08/25/2019 1     Lymphocytes Relative 08/25/2019 25     Monocytes Relative 08/25/2019 6     Eosinophils Relative 08/25/2019 4     Basophils Relative 08/25/2019 1     Neutrophils Absolute 08/25/2019 6 88     Immature Grans Absolute 08/25/2019 0 05     Lymphocytes Absolute 08/25/2019 2 70     Monocytes Absolute 08/25/2019 0 64     Eosinophils Absolute 08/25/2019 0 45     Basophils Absolute 08/25/2019 0 06     Troponin I 08/25/2019 1 53*    Sodium 08/25/2019 137     Potassium 08/25/2019 3 8     Chloride 08/25/2019 104     CO2 08/25/2019 28     ANION GAP 08/25/2019 5     BUN 08/25/2019 13     Creatinine 08/25/2019 0 88     Glucose 08/25/2019 129     Calcium 08/25/2019 9 2     eGFR 08/25/2019 93     PTT 08/25/2019 66*    POC Glucose 08/25/2019 158*    POC Glucose 08/25/2019 198*    PTT 08/25/2019 62*    POC Glucose 08/25/2019 68     Ventricular Rate 08/24/2019 73     Atrial Rate 08/24/2019 73     ND Interval 08/24/2019 166     QRSD Interval 08/24/2019 92     QT Interval 08/24/2019 374     QTC Interval 08/24/2019 412     P Axis 08/24/2019 71     QRS Axis 08/24/2019 13     T Wave Axis 08/24/2019 55     POC Glucose 08/25/2019 120     PTT 08/26/2019 67*    POC Glucose 08/26/2019 227*    Activated Clotting Time,* 08/26/2019 212*    Specimen Type 08/26/2019 VENOUS     POC Glucose 08/26/2019 174*    WBC 08/26/2019 14 35*    RBC 08/26/2019 4 84     Hemoglobin 08/26/2019 15 0     Hematocrit 08/26/2019 44 8     MCV 08/26/2019 93     MCH 08/26/2019 31 0     MCHC 08/26/2019 33 5     RDW 08/26/2019 11 8     MPV 08/26/2019 9 7     Platelets 28/69/3593 248     nRBC 08/26/2019 0     Neutrophils Relative 08/26/2019 82*    Immat GRANS % 08/26/2019 1     Lymphocytes Relative 08/26/2019 13*    Monocytes Relative 08/26/2019 4     Eosinophils Relative 08/26/2019 0     Basophils Relative 08/26/2019 0     Neutrophils Absolute 08/26/2019 11 74*    Immature Grans Absolute 08/26/2019 0 15     Lymphocytes Absolute 08/26/2019 1 82     Monocytes Absolute 08/26/2019 0 61     Eosinophils Absolute 08/26/2019 0 00     Basophils Absolute 08/26/2019 0 03     Sodium 08/26/2019 135*    Potassium 08/26/2019 4 5     Chloride 08/26/2019 101     CO2 08/26/2019 26     ANION GAP 08/26/2019 8     BUN 08/26/2019 13     Creatinine 08/26/2019 0 86     Glucose 08/26/2019 154*    Calcium 08/26/2019 9 5     eGFR 08/26/2019 94     Troponin I 08/26/2019 0 62*    POC Glucose 08/26/2019 150*    Sodium 08/27/2019 135*    Potassium 08/27/2019 4 0     Chloride 08/27/2019 103     CO2 08/27/2019 26     ANION GAP 08/27/2019 6     BUN 08/27/2019 15     Creatinine 08/27/2019 0 85     Glucose 08/27/2019 141*    Calcium 08/27/2019 9 1     eGFR 08/27/2019 95     WBC 08/27/2019 14 34*    RBC 08/27/2019 4 13     Hemoglobin 08/27/2019 13 1     Hematocrit 08/27/2019 38 6     MCV 08/27/2019 94     MCH 08/27/2019 31 7     MCHC 08/27/2019 33 9     RDW 08/27/2019 11 9     MPV 08/27/2019 9 8     Platelets 83/71/2076 205     nRBC 08/27/2019 0     Neutrophils Relative 08/27/2019 73     Immat GRANS % 08/27/2019 1     Lymphocytes Relative 08/27/2019 17     Monocytes Relative 08/27/2019 8     Eosinophils Relative 08/27/2019 1     Basophils Relative 08/27/2019 0     Neutrophils Absolute 08/27/2019 10 36*    Immature Grans Absolute 08/27/2019 0 11     Lymphocytes Absolute 08/27/2019 2 49     Monocytes Absolute 08/27/2019 1 13     Eosinophils Absolute 08/27/2019 0 20     Basophils Absolute 08/27/2019 0 05     Troponin I 08/27/2019 8 00*    POC Glucose 08/27/2019 184*    POC Glucose 08/27/2019 190*    POC Glucose 08/27/2019 97     Ventricular Rate 08/27/2019 58     Atrial Rate 08/27/2019 58     NV Interval 08/27/2019 172     QRSD Interval 08/27/2019 98     QT Interval 08/27/2019 426     QTC Interval 08/27/2019 418     P Axis 08/27/2019 68     QRS Axis 08/27/2019 19     T Wave Axis 08/27/2019 66     Ventricular Rate 08/28/2019 78     Atrial Rate 08/28/2019 78     NV Interval 08/28/2019 154     QRSD Interval 08/28/2019 86     QT Interval 08/28/2019 366     QTC Interval 08/28/2019 417     P Axis 08/28/2019 70     QRS Axis 08/28/2019 27     T Wave Axis 08/28/2019 69     POC Glucose 08/28/2019 127     POC Glucose 08/28/2019 139    Office Visit on 08/24/2019   Component Date Value    Ventricular Rate 08/24/2019 71     Atrial Rate 08/24/2019 71     NV Interval 08/24/2019 152     QRSD Interval 08/24/2019 98     QT Interval 08/24/2019 388     QTC Interval 08/24/2019 421     P Axis 08/24/2019 70     QRS Axis 08/24/2019 16     T Wave Axis 08/24/2019 46     Ventricular Rate 08/24/2019 71     Atrial Rate 08/24/2019 71     NV Interval 08/24/2019 152     QRSD Interval 08/24/2019 98     QT Interval 08/24/2019 388     QTC Interval 08/24/2019 421     P Axis 08/24/2019 70     QRS Axis 08/24/2019 16     T Wave Axis 08/24/2019 46      Imaging: Xr Chest 2 Views    Result Date: 8/25/2019  Narrative: CHEST INDICATION:   Chest Pain  COMPARISON:  5/22/2018 EXAM PERFORMED/VIEWS:  XR CHEST PA & LATERAL FINDINGS: Cardiomediastinal silhouette appears unremarkable  The lungs are clear  No pneumothorax or pleural effusion  Osseous structures appear within normal limits for patient age  Impression: No acute cardiopulmonary disease  Workstation performed: [id number]       Review of Systems:  Review of Systems   Respiratory: Positive for chest tightness and shortness of breath  Musculoskeletal: Positive for arthralgias         Physical Exam:  Physical Exam   Constitutional: He is oriented to person, place, and time  He appears well-developed  HENT:   Head: Normocephalic  Eyes: Pupils are equal, round, and reactive to light  Neck: Normal range of motion  Cardiovascular: Normal rate and regular rhythm  Pulmonary/Chest: Effort normal and breath sounds normal    Abdominal: Soft  Bowel sounds are normal    Musculoskeletal: Normal range of motion  Neurological: He is alert and oriented to person, place, and time  Skin: Skin is warm and dry  Capillary refill takes less than 2 seconds  Anterior right radial with sm amount of ecchymosis    Psychiatric: He has a normal mood and affect  Vitals reviewed  Discussion/Summary:  1  Recent NSTEMI  2  8/26/19 SP successful GLENN x 2 performed on the 80% lesion in mid LAD  Balloon angioplasty perfromed on 100% lesion in posterior septal artery  Following intervention 50% residual stenosis  Continue on aspirin 81 mg daily, Cozaar 25 mg daily, metoprolol tartrate 50 mg q 12 hours, Brilinta 90 mg q 12 hours, Crestor 40 mg daily, Zetia 10 mg daily and Niasapan 1000mg daily   Referral to cardiac rehabilitation  Nury Andrea has SL NTG and is aware on the proper use  Was instructed not to stop aspirin or Brilinta for any reason  CBC and BMP to be done in near future   3  DM2 Hgb A1C 6 4- ambulatory referral to endocrinology, continue metformin 1000 mg b i d , Amaryl 2 mg daily  4  Hyperlipidemia- 12/15/16 cholesterol 119, triglycerides 86, HDL 50, LDL 52  He will need fasting lipid panel in 3 months  Continue on Crestor 40 mg daily, Zetia 10 mg daily, Niaspan 1000 mg daily  5  HTN- BP low in office, + Fatigue, decrease Cozaar from 50mg daily to 25mg daily, continue metoprolol tartrate 50 mg q 12 hours  6   Tobacco abuse- continues with smoking cessation, using nicotine patch daily  Ambulatory referral to nutrition

## 2019-09-09 NOTE — PATIENT INSTRUCTIONS
2gm sodium low fat low cholesterol diet,eating fresh is best, fresh fruit, fresh vegetables, lean protein    Cardiac Rehabilitation  Referral to Endocrinology  Referral to dietitian for diabetes and CAD

## 2019-09-18 ENCOUNTER — OFFICE VISIT (OUTPATIENT)
Dept: FAMILY MEDICINE CLINIC | Facility: CLINIC | Age: 59
End: 2019-09-18
Payer: COMMERCIAL

## 2019-09-18 VITALS
BODY MASS INDEX: 28.76 KG/M2 | SYSTOLIC BLOOD PRESSURE: 120 MMHG | DIASTOLIC BLOOD PRESSURE: 70 MMHG | WEIGHT: 217 LBS | TEMPERATURE: 97.6 F | HEIGHT: 73 IN

## 2019-09-18 DIAGNOSIS — I10 ESSENTIAL HYPERTENSION: ICD-10-CM

## 2019-09-18 DIAGNOSIS — I21.4 NSTEMI (NON-ST ELEVATED MYOCARDIAL INFARCTION) (HCC): ICD-10-CM

## 2019-09-18 DIAGNOSIS — R53.1 WEAKNESS: ICD-10-CM

## 2019-09-18 DIAGNOSIS — E11.00 TYPE 2 DIABETES MELLITUS WITH HYPEROSMOLARITY WITHOUT COMA, WITHOUT LONG-TERM CURRENT USE OF INSULIN (HCC): Primary | ICD-10-CM

## 2019-09-18 LAB
CREAT UR-MCNC: 138 MG/DL
MICROALBUMIN UR-MCNC: 70.9 MG/L (ref 0–20)
MICROALBUMIN/CREAT 24H UR: 51 MG/G CREATININE (ref 0–30)
SL AMB POCT GLUCOSE BLD: 43

## 2019-09-18 PROCEDURE — 82948 REAGENT STRIP/BLOOD GLUCOSE: CPT | Performed by: FAMILY MEDICINE

## 2019-09-18 PROCEDURE — 82570 ASSAY OF URINE CREATININE: CPT | Performed by: FAMILY MEDICINE

## 2019-09-18 PROCEDURE — 99213 OFFICE O/P EST LOW 20 MIN: CPT | Performed by: FAMILY MEDICINE

## 2019-09-18 PROCEDURE — 82043 UR ALBUMIN QUANTITATIVE: CPT | Performed by: FAMILY MEDICINE

## 2019-09-18 NOTE — PROGRESS NOTES
Assessment/Plan: The blood sugar 43 today  Patient given sugar  Patient will stop Amaryl  Patient will check blood sugars as directed  The blood pressure improved  Follow-up in 3 weeks       Diagnoses and all orders for this visit:    Type 2 diabetes mellitus with hyperosmolarity without coma, without long-term current use of insulin ([facility] )  -     Ambulatory referral to Ophthalmology; Future  -     Glucometer  -     Glucometer test strips    Weakness  -     POCT blood glucose    NSTEMI (non-ST elevated myocardial infarction) ([facility] )    Essential hypertension            Subjective:        Patient ID: Juan F Isbell is a 60 y o  male  Patient follow-up on CAD, diabetes and hypertension  Patient did see Cardiology nurse and had losartan decrease dose  Blood pressure is better today  Patient was trying to get in with Endocrinology and had some issues with them  Patient has appointment with Endocrinology on October 31st         The following portions of the patient's history were reviewed and updated as appropriate: allergies, current medications, past family history, past medical history, past social history, past surgical history and problem list       Review of Systems   Constitutional: Negative  HENT: Negative  Eyes: Negative  Respiratory: Negative  Cardiovascular: Negative  Gastrointestinal: Negative  Endocrine: Negative  Genitourinary: Negative  Musculoskeletal: Negative  Skin: Negative  Allergic/Immunologic: Negative  Neurological: Negative  Hematological: Negative  Psychiatric/Behavioral: Negative  Objective:    /70 (BP Location: Right arm, Patient Position: Sitting, Cuff Size: Adult)   Temp 97 6 °F (36 4 °C) (Tympanic)   Ht 6' 1" (1 854 m)   Wt 98 4 kg (217 lb)   BMI 28 63 kg/m²          Physical Exam   Constitutional: He is oriented to person, place, and time  He appears well-developed and well-nourished  No distress     HENT:   Head: Normocephalic  Right Ear: External ear normal    Left Ear: External ear normal    Mouth/Throat: Oropharynx is clear and moist  No oropharyngeal exudate  Eyes: Pupils are equal, round, and reactive to light  EOM are normal  Right eye exhibits no discharge  Left eye exhibits no discharge  No scleral icterus  Neck: Normal range of motion  Neck supple  No thyromegaly present  Cardiovascular: Normal rate, regular rhythm, normal heart sounds and intact distal pulses  Exam reveals no gallop and no friction rub  Pulses are no weak pulses  No murmur heard  Pulses:       Dorsalis pedis pulses are 2+ on the right side, and 2+ on the left side  Posterior tibial pulses are 2+ on the right side, and 2+ on the left side  Pulmonary/Chest: Effort normal and breath sounds normal  No respiratory distress  He has no wheezes  He has no rales  He exhibits no tenderness  Musculoskeletal: Normal range of motion  He exhibits no edema or tenderness  Feet:   Right Foot:   Skin Integrity: Negative for ulcer, skin breakdown, erythema, warmth, callus or dry skin  Left Foot:   Skin Integrity: Negative for ulcer, skin breakdown, erythema, warmth, callus or dry skin  Lymphadenopathy:     He has no cervical adenopathy  Neurological: He is oriented to person, place, and time  No cranial nerve deficit  He exhibits normal muscle tone  Coordination normal    Skin: Skin is warm and dry  No rash noted  He is not diaphoretic  No erythema  No pallor  Psychiatric: He has a normal mood and affect  His behavior is normal  Judgment and thought content normal    Nursing note and vitals reviewed  Patient's shoes and socks removed  Right Foot/Ankle   Right Foot Inspection  Skin Exam: skin normal and skin intact no dry skin, no warmth, no callus, no erythema, no maceration, no abnormal color, no pre-ulcer, no ulcer and no callus                          Toe Exam: ROM and strength within normal limits  Sensory       Monofilament testing: intact  Vascular  Capillary refills: < 3 seconds  The right DP pulse is 2+  The right PT pulse is 2+  Left Foot/Ankle  Left Foot Inspection  Skin Exam: skin normal and skin intactno dry skin, no warmth, no erythema, no maceration, normal color, no pre-ulcer, no ulcer and no callus                         Toe Exam: ROM and strength within normal limits                   Sensory       Monofilament: intact  Vascular  Capillary refills: < 3 seconds  The left DP pulse is 2+  The left PT pulse is 2+  Assign Risk Category:  No deformity present; No loss of protective sensation;  No weak pulses       Risk: 0

## 2019-09-26 ENCOUNTER — CLINICAL SUPPORT (OUTPATIENT)
Dept: CARDIAC REHAB | Age: 59
End: 2019-09-26
Payer: COMMERCIAL

## 2019-09-26 DIAGNOSIS — I21.4 NSTEMI (NON-ST ELEVATED MYOCARDIAL INFARCTION) (HCC): ICD-10-CM

## 2019-09-26 DIAGNOSIS — Z95.5 S/P CORONARY ARTERY STENT PLACEMENT: ICD-10-CM

## 2019-09-26 PROCEDURE — 93798 PHYS/QHP OP CAR RHAB W/ECG: CPT

## 2019-09-26 NOTE — PROGRESS NOTES
Daron Jones        Dear Dr Parris Yang,    Emotional well-being and depression is addressed in the Cardiac  rehab evaluation  To assess the severity of depression, patients are given the PHQ-9 Depression Questionnaire  This is to inform you that your patient Liliane Gaona scored a 12 which is interpreted as Moderate Depression  When addressed he denies depression/anxiety and reports excellent social support  Your patient was provided contact information for Critical access hospital  A repeat PHQ -9 will be administered in 30 days to assess improvement and/or need to explore other mental health resources  Please advise if you recommend additional mental health services or initiation of medical therapy  Thank you for your continued support of cardiac rehabilitation       Sincerely,      Jorge Frey, MS, CCRP  Exercise Physiologist

## 2019-09-26 NOTE — PROGRESS NOTES
CARDIAC REHAB ASSESSMENT    Today's date: 2019  Patient name: Sal Rhodes     : [birth year]       MRN: [id number]  PCP: Nakul Costa DO  Referring Physician: Matt Stephens DO  Cardiologist: Viktoria Christian MD  Surgeon:   Dx:   Encounter Diagnoses   Name Primary?     NSTEMI (non-ST elevated myocardial infarction) ([place] [address] )     S/P coronary artery stent placement        Date of onset: 19, 19  Cultural needs:     Height:    Wt Readings from Last 1 Encounters:   19 98 4 kg (217 lb)      Weight:   Ht Readings from Last 1 Encounters:   19 6' 1" (1 854 m)     Medical History:   Past Medical History:   Diagnosis Date    Asthma     Coronary artery disease     Diabetes ([place] [address] )     HTN (hypertension)     Hypercholesteremia     Myocardial infarction ([place] [address] )          Physical Limitations: none - arthritis in L shoulder which radiates into chest    Risk Factors   Cholesterol: Yes - no recent lipid  Smoking: Recent user, quit in last 6 months - 1 5 ppd x 35 years - not using the patch - used it for a week   HTN: Yes  DM: Type 2 - did not monitor BG until after MI - am  - monitors 2x/day  Obesity: Yes   Inactivity: Yes  Stress:  perceived  stress: 9/10  ([employer])   Stressors: work   Goals for Stress Management: attend [employer]    Family History:  Family History   Problem Relation Age of Onset    Liver cancer Father     Skin cancer Father     Tremor Mother     Tremor Maternal Grandmother        Allergies: Penicillins and Shellfish allergy  ETOH:   Social History     Substance and Sexual Activity   Alcohol Use Never    Frequency: Never         Current Medications:   Current Outpatient Medications   Medication Sig Dispense Refill    albuterol (PROAIR HFA) 90 mcg/act inhaler Inhale 2 puffs every 4 (four) hours as needed for shortness of breath 1 each 0    aspirin (ECOTRIN LOW STRENGTH) 81 mg EC tablet Take 1 tablet (81 mg total) by mouth daily 30 tablet 0    ezetimibe (Donalyannia Cedar) 10 mg tablet Take 1 tablet (10 mg total) by mouth daily 90 tablet 1    fluticasone (FLONASE) 50 mcg/act nasal spray 2 sprays into each nostril daily (Patient taking differently: 2 sprays into each nostril daily as needed ) 1 Bottle 11    losartan (COZAAR) 25 mg tablet Take 1 tablet (25 mg total) by mouth daily 30 tablet 3    metFORMIN (GLUCOPHAGE) 1000 MG tablet Take 1 tablet (1,000 mg total) by mouth 2 (two) times a day 180 tablet 1    metoprolol tartrate (LOPRESSOR) 50 mg tablet Take 1 tablet (50 mg total) by mouth every 12 (twelve) hours 60 tablet 0    niacin (NIASPAN) 1000 MG CR tablet Take 1 tablet (1,000 mg total) by mouth daily at bedtime 90 tablet 1    nicotine (NICODERM CQ) 21 mg/24 hr TD 24 hr patch Place 1 patch on the skin daily (Patient not taking: Reported on 9/18/2019) 30 patch 0    nitroglycerin (NITROSTAT) 0 4 mg SL tablet Place 1 tablet (0 4 mg total) under the tongue every 5 (five) minutes as needed for chest pain 30 tablet 0    primidone (MYSOLINE) 50 mg tablet Take 2 tablets (100 mg total) by mouth every 12 (twelve) hours (Patient taking differently: Take 100 mg by mouth daily at bedtime ) 120 tablet 3    rosuvastatin (CRESTOR) 40 MG tablet Take 1 tablet (40 mg total) by mouth daily 30 tablet 0    ticagrelor (BRILINTA) 90 MG Take 1 tablet (90 mg total) by mouth every 12 (twelve) hours 60 tablet 0     No current facility-administered medications for this visit  Functional Status Prior to Diagnosis for Treatment   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  Recreation: music, attending [employer]  ADLs: No limitations  Gravel Switch: No limitations  Exercise: none  Other:     Current Functional Status  Occupation: has returned unrestricted/regular duty  Recreation: returned to [employer]  ADLs:hasn't done anything - he was told not to lift anything over 10lbs  Gravel Switch: No limitations  Exercise: none  Other:     Patient Specific Goals:   Wt loss to a appropriate BMI, dietary modifications    Short Term Program Goals: dietary modifications increased strength improved energy/stamina with ADLs exercise 120-150 mins/wk improved BG control wt loss 1-2 ppw    Long Term Goals: Improved functional capacity  Improved Quality of Life - [facility] score reduced  Improved lipid profile  Reduced body fat%  Reduced waist circumference  Abstain from smoking  Improved A1c  Reduced stress  weight loss goal of 20lbs  improved Rate Your Plate Score    Ability to reach goals/rehabilitation potential:  Excellent    Projected return to function: 12 weeks  Objective tests: sub-max TM ETT      Nutritional   Reviewed details of Rate your Plate  Discussed key elements of heart healthy eating  Reviewed patient goals for dietary modifications and their clinical implications  Reviewed most recent lipid profile       Goals for dietary modification: choose lean cuts of meat  poultry without the skin  low fat ground meat and poultry  eliminate processed meats  reduce portions of meat to 3 oz  increase fish intake  more meatless meals  low fat dairy   reduced fat cheese  increase whole grains  increase fruits and vegetables  eliminate butter  low sodium  improved snack choices  more nuts/seeds  reduce sweets/frozen desserts  heathier choices while dining out      Emotional/Social  Patient reports feeling some depression since d/c  - worried about his health and limitations  Has a good support system    SOCIAL SUPPORT NETWORK    Marital status: single    Rate 1-5:    Marriage: n/a   Family: daughter, grand daughter and son in law lives with him   Financial: 5   Relationships: 5   Spirituality: 5   Intellectual: 5      Domestic Violence Screening: No    Comments: Oct  30 appt with dietitian, has quit smoking and drinking  Has difficulty focusing  Carries NTG - reviewed use of NTG  Current event:  Smoking a cigarette and having coffee - sudden onset of severe chest pain radiating into back - tried to lay down to relieve pain - became diaphoretic and started to vomit - sone in law drove him  to urgent care - who called 911 -   Falls asleep quickly but wakes up in the middle of the night and cannot get back to sleep

## 2019-09-26 NOTE — PROGRESS NOTES
Cardiac Rehabilitation Plan of Care   Care Plan       Today's date: 2019   Visits: Initial Evaluation  Patient name: Sea Elias      : [birth year]  Age: 60 y o  MRN: [id number]  Referring Physician: Beverley Rizo DO  Cardiologist: Donna Sanabria MD  Provider: Byron Torres  Clinician: Alli Oreilly MS, CCRP    Dx:   Encounter Diagnoses   Name Primary?  NSTEMI (non-ST elevated myocardial infarction) ([facility] [address] )     S/P coronary artery stent placement      Date of onset: 19      SUMMARY OF PROGRESS:  Katia Zamora is 4 weeks post NSTEMI, stent to LAD  He reports he has been keeping his physical activity light with minimal ADLs and following his 10lb lift restriction given to him at d/c in August   He has not engaged in regular walking or exercise  He carries his NTG and understands its proper use but has remained free of chest tightness and other cardiac sxs  He does reports sudden onset of SOB which occurs at random  He is now monitoring his BG 2x/day and reports his FBG   His PHQ-9 score of 12 suggests moderate depression, however when addressed with Katia Zamora, he denies depression/anxiety and reports excellent social support  His PHQ-9 will be forwarded to his PCP and repeated in 30 days  He has abstained from smoking since his MI and no longer uses the nicotine patch  We discussed his current dietary habits and reviewed heart healthy eating for wt loss, lipid management and DM control  His last lipid profile on record is from 2016  He completed 2 mins of stage III (4 3 METs) in the submaximal TM ETT  Test terminated at RPE -6    He c/o dyspnea 6/10 and mild leg fatigue  No chest pain  NSR on tele with no ectopy observed  Resting /54 with normal hemodynamic response to exercise reaching 140/74  He has an ov scheduled with Dr Fredis Jones on Oct  17   He is scheduled to begin CR  and attend 36 monitored exercise sessions plus risk factor education  Medication compliance: Yes   Comments:   Fall Risk: Low   Comments:     EKG changes: none      EXERCISE ASSESSMENT and PLAN    Current Exercise Program in Rehab:       Frequency: 3 days/week        Minutes: 30-40         METS: 2 5 - 3 5            HR:    RPE: 4-5         Modalities: Treadmill, Airdyne bike, UBE, Lifecycle and Recumbent bike      Exercise Progression 30 Day Goals :    Frequency: 3 days/week  With home exercise   Minutes: 40   METS: 3 0-5 0   HR:     RPE: 4-6   Modalities: Treadmill, Airdyne bike, UBE, Lifecycle and Rower    Strength trainin-3 days / week  12-15 repetitions  1-2 sets per modality   Will be added following 2-3 weeks of monitored exercise sessions   Modalities: Leg Press, Chest Press, Pull Downs, Lateral Raise, Arm Extension and Arm Curl    Progressing: In Progress    Home Exercise: None    Goals: 10% improvement in functional capacity, Reduced Dyspnea with physical activity  0-10, Increase in peak CR METs by 40%, Resume ADLs with increased strength and Exercise 5 days/wk, >150mins/wk  Education: Benefit of exercise for CAD risk factors, signs and sxs and RPE scale   Plan:education on home exercise guidelines, home exercise 30+ mins 2 days opposite CR and Education class: Risk Factors for Heart Disease  Readiness to change: Preparation      NUTRITION ASSESSMENT AND PLAN    Weight control:    Starting weight: 219   Current weight:     Waist circumference:    Startin 5   Current:    Diabetes: Patient reported fasting BS   Lipid management: Discussed diet and lipid management and last lipid profile 2016  Goals:reduced BMI to < 25, decreased body fat% <25%   , fasting BG , improved A1c  < 6 5%, Improved Rate Your Plate score  >43 and 2 5-5%  wt loss  Education: heart healthy eating  low sodium diet  diet and lipid management  diabetes management and exercise  wt  loss  Progressing: In Progress  Plan: Education class: [employer], Education Class: Heart Healthy Eating, Increase PUFA and MUFA, Decrease SFA, Increase whole grains, increase fruits/vegs, eliminate processed meats, reduce red meat 1x/wk, swtich to low fat dairy and Reduce added sugars <25g/day  Readiness to change: Preparation      PSYCHOSOCIAL ASSESSMENT AND PLAN    Emotional:  PHQ-9 = 12 = Moderate Depression  Self-reported stress level: 9-10/10   - work related  Social support: Excellent  Goals:  Reduce perceived stress to 1-3/10, PHQ-9 - reduced severity by one level, behavioral health consult, Feelings in [facility] Score < 3, Physical Fitness in [facility] Score < 3, Daily Activity in [facility] Score < 3, Overall Health in [facility] Score < 3, Increased interest in doing things, improved concentration and increased energy  Education: benefits of positive support system and coping mechanisms  Progressing: In Progress  Plan: Class: Stress and Your Health, Class: Relaxation and PHQ-9 >5 will refer to MD  Readiness to change: Contemplation      OTHER CORE COMPONENTS     Tobacco:   Social History     Tobacco Use   Smoking Status Former Smoker    Packs/day: 1 00    Years: 40 00    Pack years: 40 00    Types: Cigarettes   Smokeless Tobacco Never Used   Tobacco Comment    he has quit several times before       Tobacco Use Intervention: Referral to tobacco expert:   Smoked 1 5 ppd x 35 years                    Has abstained since MI        Brief counseling by cardiac rehab professional  Date: 19    Blood pressure:    Restin/54   Exercise: 140/74    Goals: reduced dietary sodium <2300mg and consistent exercise >150 mins/wk  Education:  understanding HTN and CAD, low sodium diet and HTN, smoking and heart disease and tobacco triggers  Progressing: In Progress  Plan: Class: Understanding Heart Disease, Class: Common Heart Medications and Complete abstention from smoking  Readiness to change: Action

## 2019-09-27 DIAGNOSIS — J40 BRONCHITIS: ICD-10-CM

## 2019-09-27 DIAGNOSIS — Z95.5 S/P CORONARY ARTERY STENT PLACEMENT: ICD-10-CM

## 2019-09-27 DIAGNOSIS — E11.9 TYPE 2 DIABETES MELLITUS WITHOUT COMPLICATION, UNSPECIFIED WHETHER LONG TERM INSULIN USE (HCC): ICD-10-CM

## 2019-09-27 DIAGNOSIS — I21.4 NSTEMI (NON-ST ELEVATED MYOCARDIAL INFARCTION) (HCC): ICD-10-CM

## 2019-09-27 RX ORDER — METOPROLOL TARTRATE 50 MG/1
50 TABLET, FILM COATED ORAL EVERY 12 HOURS SCHEDULED
Qty: 180 TABLET | Refills: 1 | Status: SHIPPED | OUTPATIENT
Start: 2019-09-27 | End: 2019-12-31 | Stop reason: SDUPTHER

## 2019-09-27 RX ORDER — ASPIRIN 81 MG/1
81 TABLET ORAL DAILY
Qty: 30 TABLET | Refills: 0 | Status: SHIPPED | OUTPATIENT
Start: 2019-09-27

## 2019-09-27 RX ORDER — ROSUVASTATIN CALCIUM 40 MG/1
40 TABLET, COATED ORAL DAILY
Qty: 90 TABLET | Refills: 1 | Status: SHIPPED | OUTPATIENT
Start: 2019-09-27 | End: 2019-12-31 | Stop reason: SDUPTHER

## 2019-09-27 RX ORDER — LOSARTAN POTASSIUM 25 MG/1
25 TABLET ORAL DAILY
Qty: 30 TABLET | Refills: 3
Start: 2019-09-27 | End: 2019-10-22 | Stop reason: SDUPTHER

## 2019-09-27 RX ORDER — EZETIMIBE 10 MG/1
10 TABLET ORAL DAILY
Qty: 90 TABLET | Refills: 1 | Status: SHIPPED | OUTPATIENT
Start: 2019-09-27 | End: 2019-12-31 | Stop reason: SDUPTHER

## 2019-09-27 RX ORDER — NIACIN 1000 MG/1
1000 TABLET, EXTENDED RELEASE ORAL
Qty: 90 TABLET | Refills: 1 | Status: SHIPPED | OUTPATIENT
Start: 2019-09-27 | End: 2019-12-31 | Stop reason: SDUPTHER

## 2019-09-30 ENCOUNTER — CLINICAL SUPPORT (OUTPATIENT)
Dept: CARDIAC REHAB | Age: 59
End: 2019-09-30
Payer: COMMERCIAL

## 2019-09-30 DIAGNOSIS — I21.4 NSTEMI (NON-ST ELEVATED MYOCARDIAL INFARCTION) (HCC): ICD-10-CM

## 2019-09-30 DIAGNOSIS — Z95.5 STENTED CORONARY ARTERY: ICD-10-CM

## 2019-09-30 PROCEDURE — 93798 PHYS/QHP OP CAR RHAB W/ECG: CPT

## 2019-10-02 ENCOUNTER — CLINICAL SUPPORT (OUTPATIENT)
Dept: CARDIAC REHAB | Age: 59
End: 2019-10-02
Payer: COMMERCIAL

## 2019-10-02 DIAGNOSIS — Z95.5 STENTED CORONARY ARTERY: ICD-10-CM

## 2019-10-02 DIAGNOSIS — I21.4 NSTEMI (NON-ST ELEVATED MYOCARDIAL INFARCTION) (HCC): ICD-10-CM

## 2019-10-02 PROCEDURE — 93798 PHYS/QHP OP CAR RHAB W/ECG: CPT

## 2019-10-03 ENCOUNTER — OFFICE VISIT (OUTPATIENT)
Dept: FAMILY MEDICINE CLINIC | Facility: CLINIC | Age: 59
End: 2019-10-03
Payer: COMMERCIAL

## 2019-10-03 VITALS
WEIGHT: 213.8 LBS | HEIGHT: 73 IN | TEMPERATURE: 98 F | DIASTOLIC BLOOD PRESSURE: 72 MMHG | SYSTOLIC BLOOD PRESSURE: 100 MMHG | BODY MASS INDEX: 28.34 KG/M2

## 2019-10-03 DIAGNOSIS — R80.9 MICROALBUMINURIA: ICD-10-CM

## 2019-10-03 DIAGNOSIS — Z12.11 SCREENING FOR COLON CANCER: ICD-10-CM

## 2019-10-03 DIAGNOSIS — I10 ESSENTIAL HYPERTENSION: ICD-10-CM

## 2019-10-03 DIAGNOSIS — E78.2 MIXED HYPERLIPIDEMIA: ICD-10-CM

## 2019-10-03 DIAGNOSIS — E11.00 TYPE 2 DIABETES MELLITUS WITH HYPEROSMOLARITY WITHOUT COMA, WITHOUT LONG-TERM CURRENT USE OF INSULIN (HCC): Primary | ICD-10-CM

## 2019-10-03 DIAGNOSIS — I21.4 NSTEMI (NON-ST ELEVATED MYOCARDIAL INFARCTION) (HCC): ICD-10-CM

## 2019-10-03 PROCEDURE — 3074F SYST BP LT 130 MM HG: CPT | Performed by: FAMILY MEDICINE

## 2019-10-03 PROCEDURE — 99214 OFFICE O/P EST MOD 30 MIN: CPT | Performed by: FAMILY MEDICINE

## 2019-10-03 PROCEDURE — 3078F DIAST BP <80 MM HG: CPT | Performed by: FAMILY MEDICINE

## 2019-10-03 PROCEDURE — 3008F BODY MASS INDEX DOCD: CPT | Performed by: FAMILY MEDICINE

## 2019-10-03 NOTE — PROGRESS NOTES
Assessment/Plan:  Patient doing well overall  Chronic conditions stable overall  Patient will follow with cardiac rehab and Cardiology  Continue with current medications  Guidance given  The labs reviewed  Follow up 3 months   Diagnoses and all orders for this visit:    Type 2 diabetes mellitus with hyperosmolarity without coma, without long-term current use of insulin (HCC)  -     CBC and differential; Future  -     Comprehensive metabolic panel; Future  -     Hemoglobin A1C; Future  -     Lipid panel; Future  -     TSH, 3rd generation with Free T4 reflex; Future    Screening for colon cancer  -     Occult Blood, Fecal Immunochemical; Future    Essential hypertension  -     CBC and differential; Future  -     Comprehensive metabolic panel; Future  -     Hemoglobin A1C; Future  -     Lipid panel; Future  -     TSH, 3rd generation with Free T4 reflex; Future    [facility]non[facility]ST [facility]  -     CBC and differential; Future  -     Comprehensive metabolic panel; Future  -     Hemoglobin A1C; Future  -     Lipid panel; Future  -     TSH, 3rd generation with Free T4 reflex; Future    Mixed hyperlipidemia  -     CBC and differential; Future  -     Comprehensive metabolic panel; Future  -     Hemoglobin A1C; Future  -     Lipid panel; Future  -     TSH, 3rd generation with Free T4 reflex; Future    Microalbuminuria            Subjective:        Patient ID: Clarice Christianson is a 60 y o  male  Patient follow-up on CAD, diabetes hypertension hyperlipidemia  Blood sugars are in the 90s to the low 100s  Patient is in cardiac rehab  The patient has been going dietary classes for cardiac issues  Patient has still been smoke-free          The following portions of the patient's history were reviewed and updated as appropriate: allergies, current medications, past family history, past medical history, past social history, past surgical history and problem list       Review of Systems Constitutional: Negative  HENT: Negative  Eyes: Negative  Respiratory: Negative  Cardiovascular: Negative  Gastrointestinal: Negative  Endocrine: Negative  Genitourinary: Negative  Musculoskeletal: Negative  Skin: Negative  Allergic/Immunologic: Negative  Neurological: Negative  Hematological: Negative  Psychiatric/Behavioral: Negative  Objective:      BMI Counseling: Body mass index is 28 21 kg/m²  Discussed the patient's BMI with him  The BMI is above normal  Nutrition recommendations include reducing portion sizes  /72 (BP Location: Left arm, Patient Position: Sitting, Cuff Size: Standard)   Temp 98 °F (36 7 °C) (Tympanic)   Ht 6' 1" (1 854 m)   Wt 97 kg (213 lb 12 8 oz)   BMI 28 21 kg/m²          Physical Exam   Constitutional: He appears well-developed and well-nourished  No distress  HENT:   Head: Normocephalic  Right Ear: External ear normal    Left Ear: External ear normal    Mouth/Throat: Oropharynx is clear and moist  No oropharyngeal exudate  Eyes: Pupils are equal, round, and reactive to light  EOM are normal  Right eye exhibits no discharge  Left eye exhibits no discharge  No scleral icterus  Neck: Normal range of motion  Neck supple  No thyromegaly present  Cardiovascular: Normal rate, regular rhythm, normal heart sounds and intact distal pulses  Exam reveals no gallop and no friction rub  No murmur heard  Pulmonary/Chest: Effort normal and breath sounds normal  No respiratory distress  He has no wheezes  He has no rales  He exhibits no tenderness  Abdominal: Soft  Bowel sounds are normal  He exhibits no distension  There is no tenderness  There is no rebound and no guarding  Musculoskeletal: Normal range of motion  He exhibits no edema or tenderness  Lymphadenopathy:     He has no cervical adenopathy  Neurological: He is alert  No cranial nerve deficit  He exhibits normal muscle tone   Coordination normal    Skin: Skin is warm and dry  No rash noted  He is not diaphoretic  No erythema  No pallor  Psychiatric: He has a normal mood and affect  His behavior is normal  Judgment and thought content normal    Nursing note and vitals reviewed

## 2019-10-04 ENCOUNTER — CLINICAL SUPPORT (OUTPATIENT)
Dept: CARDIAC REHAB | Age: 59
End: 2019-10-04
Payer: COMMERCIAL

## 2019-10-04 DIAGNOSIS — Z95.5 STENTED CORONARY ARTERY: ICD-10-CM

## 2019-10-04 DIAGNOSIS — I21.4 NSTEMI (NON-ST ELEVATED MYOCARDIAL INFARCTION) (HCC): ICD-10-CM

## 2019-10-04 PROCEDURE — 93798 PHYS/QHP OP CAR RHAB W/ECG: CPT

## 2019-10-07 ENCOUNTER — CLINICAL SUPPORT (OUTPATIENT)
Dept: CARDIAC REHAB | Age: 59
End: 2019-10-07
Payer: COMMERCIAL

## 2019-10-07 DIAGNOSIS — Z95.5 STENTED CORONARY ARTERY: ICD-10-CM

## 2019-10-07 DIAGNOSIS — I21.4 NSTEMI (NON-ST ELEVATED MYOCARDIAL INFARCTION) (HCC): ICD-10-CM

## 2019-10-07 PROCEDURE — 93798 PHYS/QHP OP CAR RHAB W/ECG: CPT

## 2019-10-07 NOTE — PROGRESS NOTES
[address] [facility]        RETURN PATIENT NOTE    Patient: Maykel Saleh  Medical Record Number: # [id number]  YOB: 1960  Date of visit: 10/9/2019    Referring provider: No ref  provider found    ASSESSMENT     Diagnoses for this encounter:  1  Essential tremor       Impression of this 59 yo gentleman with more than 20 years of likely familial essential tremor, improved on primidone  He did have NSTEMI in the interim complicating this symptoms but he feels better than before  He has been placed on ticagrelor more recently after primidone was started, and since the two have a possible drug reaction we will continue the dose the same instead of increasing it  Should we notice his tremor control diminishing, will consider switching to topiramate    PLAN     · Will continue the primidone 50mg qHS then since this has improved tremor control satisfactorily and he is on Brilinta (ticagrelor) which can interact with this medication if increased further  · He will recover further from his recent NSTEMI  · The patient has been instructed to call us about any new neurological problems or medication side effects  · Return to Clinic in 4 months    A total of 40 minutes were spent face-to-face with this patient, of which 25% was spent on counseling and coordination of care  HISTORY OF PRESENT ILLNESS:     Mr Mario Desai is a 60 y o  right handed male who has been [facility] the [facility] [address] [place] [address] for evaluation of tremor of hands  Last visit 5/23/19  The patient was accompanied today  History was obtained from patient and daughter  Referred by PCP Dr Dominguez Griffith  Interim History  No interim calls to office  He suffered an NSTEMI in late Aug 2019  He says he started taking the primidone only at night instead of also in the day, because "I think it made me jumpy"   He feels his writing has been better, shakiness has "gotten a lot better"  He does feel it harder to take deep breaths and sometimes a little short of breath  He has cut out smoking cigarettes entirely  INITIAL HISTORY  He say back at least 20+ years ago he his shaking was barely noticeable but now he would not be able to order soup and his handwriting is less legible  He cannot carry a cup without spilling contents  Shaking is worse in the morning  He thinks it started first with the R hand followed by the L hand two years later  He endorses neck tremor starting in 2018  Denies lip/tongue/voice/jaw tremor  Hx of tremors in her mother and his maternal grandmother  He endorses shaking during action rather than rest  He says she has no pain in the neck or arms  He denies ever having evaluation for the tremors and no prior treatment initiated  Current Relevant Medications:   Living Situation + ADLs: He is able to perform all his ADls and IADLs        REVIEW OF PAST MEDICAL, SOCIAL AND FAMILY HISTORY:  This is the list of problems as per our Medical Records:    Patient Active Problem List    Diagnosis Date Noted    Microalbuminuria 10/03/2019    NSTEMI (non-ST elevated myocardial infarction) ([facility] [address] ) 08/24/2019    Essential tremor 08/24/2019    Tremor of both hands 05/14/2019    Pharyngitis due to Streptococcus species 01/29/2019    Acute non-recurrent maxillary sinusitis 11/30/2018    Acute non-recurrent frontal sinusitis 04/25/2018    Bronchitis 04/04/2018    HTN (hypertension) 03/17/2017    Tobacco abuse 06/28/2016    Erectile dysfunction 03/03/2016    Type 2 diabetes mellitus ([facility] [address] ) 01/02/2013    Mixed hyperlipidemia 11/02/2010     Allergies   Allergen Reactions    Penicillins Throat Swelling    Shellfish Allergy Throat Swelling        Outpatient Encounter Medications as of 10/9/2019   Medication Sig Dispense Refill    albuterol (PROAIR HFA) 90 mcg/act inhaler Inhale 2 puffs every 4 (four) hours as needed for shortness of breath 1 each 0    aspirin (ECOTRIN LOW STRENGTH) 81 mg EC tablet Take 1 tablet (81 mg total) by mouth daily 30 tablet 0    ezetimibe (ZETIA) 10 mg tablet Take 1 tablet (10 mg total) by mouth daily 90 tablet 1    fluticasone (FLONASE) 50 mcg/act nasal spray 2 sprays into each nostril daily (Patient taking differently: 2 sprays into each nostril daily as needed ) 1 Bottle 11    losartan (COZAAR) 25 mg tablet Take 1 tablet (25 mg total) by mouth daily 30 tablet 3    metFORMIN (GLUCOPHAGE) 1000 MG tablet Take 1 tablet (1,000 mg total) by mouth 2 (two) times a day 180 tablet 1    metoprolol tartrate (LOPRESSOR) 50 mg tablet Take 1 tablet (50 mg total) by mouth every 12 (twelve) hours 180 tablet 1    niacin (NIASPAN) 1000 MG CR tablet Take 1 tablet (1,000 mg total) by mouth daily at bedtime 90 tablet 1    nitroglycerin (NITROSTAT) 0 4 mg SL tablet Place 1 tablet (0 4 mg total) under the tongue every 5 (five) minutes as needed for chest pain 30 tablet 0    primidone (MYSOLINE) 50 mg tablet Take 2 tablets (100 mg total) by mouth every 12 (twelve) hours (Patient taking differently: Take 100 mg by mouth daily at bedtime ) 120 tablet 3    rosuvastatin (CRESTOR) 40 MG tablet Take 1 tablet (40 mg total) by mouth daily 90 tablet 1    ticagrelor (BRILINTA) 90 MG Take 1 tablet (90 mg total) by mouth every 12 (twelve) hours 180 tablet 1    nicotine (NICODERM CQ) 21 mg/24 hr TD 24 hr patch Place 1 patch on the skin daily (Patient not taking: Reported on 9/18/2019) 30 patch 0     No facility-administered encounter medications on file as of 10/9/2019  REVIEW OF SYSTEMS:  The patient has entered data on an intake form regarding present illness, past medical and surgical history, medications, allergies, family and social history, and a full review of 14 systems  I have reviewed this form with the patient, and all the relevant information has been included on this note   The full review of systems was negative except as stated in HPI and below  Constitutional: Negative  Negative for appetite change and fever  HENT: Negative  Negative for hearing loss, tinnitus, trouble swallowing and voice change  Eyes: Negative  Negative for photophobia and pain  Respiratory: Negative  Negative for shortness of breath  Cardiovascular: Positive for chest pain  Negative for palpitations  Status post Heart Attack in August, was hospitalized for 5 days- [facility]  Gastrointestinal: Negative  Negative for nausea and vomiting  Endocrine: Negative  Negative for cold intolerance and heat intolerance  Genitourinary: Negative  Negative for dysuria, frequency and urgency  Musculoskeletal: Negative  Negative for myalgias and neck pain  Skin: Negative  Negative for rash  Neurological: Positive for dizziness, tremors (are a little better ) and light-headedness  Negative for seizures, syncope, facial asymmetry, speech difficulty, weakness and numbness  Hematological: Negative  Does not bruise/bleed easily  Psychiatric/Behavioral: Positive for sleep disturbance (Wakes up at night )  Negative for confusion and hallucinations  FOCUSED PHYSICAL EXAMINATION:     Vital signs:  /61 (BP Location: Left arm, Patient Position: Sitting, Cuff Size: Standard)   Pulse 78   Ht 6' 1" (1 854 m)   Wt 97 5 kg (215 lb)   BMI 28 37 kg/m²     General:  Well-appearing, well nourished, pleasant patient in no acute distress  Mood and Fund of Knowledge are appropriate  Head:  Normocephalic, atraumatic  Oropharynx and conjunctiva are clear  Speech  No hypophonia, no bradylalia  No scanning speech  +Slight slurring  Language: Comprehension intact  Neck:  Supple, strong 5/5 forward flexion and retroflexion  Extremities: Range of motion is normal       Cognitive and Mental Exam:  The patient is alert, oriented to self, location, date and situation   Memory is normal to provide accurate details of health history    Cranial Nerves:  CN II:  Direct and consensual light reflexes were equally reactive to light symmetrically  No afferent pupillary defect   Visual fields are full to confrontation  CN III / IV / VI: Extraocular movements were full, with normal pursuit and saccades  CN V:   Facial sensation to light touch was intact  CN VII: Face is symmetric with normal strength  CN VIII: Hearing was assessed using the Calibrated Finger Rub Auditory Screening Test (CALFRAST) and was not abnormal (Better than CALFRAST-Strong-70)  CN X:   Palate is up going bilaterally and symmetrically  CN XI:  Neck muscles are strong  CN XII: Tongue protrusion is at midline with normal movements  No dysarthria  Motor:    Spiral Drawing:  Directional and regular waviness in either hand, smaller amplitude compared to May 2019 tracing  R and L approximately equal, and regular spaces  Handwriting: small but equally spaced, tremors in lettering but more articulation of individual letters compared to the last time  Dot-to-Dot: regular wave pattern in both hands R>L       No head tremor    UPDRSIII                Time since last dose:   5/23/19   10/09/19   Speech  1 dysarthric  1 dysarthric   Facial Expression  1 1   Postural Tremor (Right) 2 1   Postural Tremor (Left) 2 1   Kinetic Tremor (Right)  2 1   Kinetic Tremor (Left)  2 1   Rest tremor amplitude RUE 0 0   Rest tremor amplitude LUE 0 0   Rest tremor amplitude RLE 0 0   Rest tremor amplitude LLE 0 0   Lip/Jaw Tremor  0 1   Consistency of tremor 0 0   Finger Taps (Right)   1 -   Finger Taps (Left)  1 -   Hand Movement (Right)  1 -   Hand Movement (Left)   1 -   Pronation/Supination (Right)  1 -   Pronation/Supination (Left)   1 -   Toe Tapping (Right) - -   Toe Tapping (Left) - -   Leg Agility (Right)  - -   Leg Agility (Left)   -  -   Rigidity - Neck  0  -   Rigidity - Upper Extremity (Right)  0  -   Rigidity - Upper Extremity (Left)   0  -   Rigidity - Lower Extremity (Right)  0 -   Rigidity - Lower Extremity (Left)   0 -   Arising from Chair   0  0    Gait   1  0   Freezing of Gait 0  0   Postural Stability  -  -   Posture 0  0   Global spontaneity of movement 1 reduced arm swing bilat  0     -------------------------------------------------------------------------------------    Muscle Strength Right Left  Muscle Strength Right Left   Deltoid 5/5 5/5  Hip Adductors 5/5 5/5   Biceps 5/5 5/5  Hip Abductors 5/5 5/5   Triceps 5/5 5/5  Knee Extensors 5/5 5/5   Wrist Extensors 5/5 5/5  Knee Flexors 5/5 5/5   Wrist Flexors 5/5 5/5  Ankle Extensors 5/5 5/5    5/5 5/5  Ankle Flexors 5/5 5/5   Finger Abductors 5/5 5/5       Hip Flexors 5/5 5/5   Hip Extensors 5/5 5/5     Coordination:  Finger-to-nose-finger: normal with mild kinetic tremor both side and worse than the L than the R       Gait:  Normal comprehensive gait evaluation, has normal raising, stance, gait, turns     Reflexes:    Right Left   Biceps 2/4 2/4   Brachioradialis 2/4 2/4   Triceps 2/4 2/4   Knee 2/4 2/4   Ankle 2/4 2/4

## 2019-10-09 ENCOUNTER — OFFICE VISIT (OUTPATIENT)
Dept: NEUROLOGY | Facility: CLINIC | Age: 59
End: 2019-10-09
Payer: COMMERCIAL

## 2019-10-09 ENCOUNTER — CLINICAL SUPPORT (OUTPATIENT)
Dept: CARDIAC REHAB | Age: 59
End: 2019-10-09
Payer: COMMERCIAL

## 2019-10-09 VITALS
DIASTOLIC BLOOD PRESSURE: 61 MMHG | SYSTOLIC BLOOD PRESSURE: 113 MMHG | HEIGHT: 73 IN | WEIGHT: 215 LBS | BODY MASS INDEX: 28.49 KG/M2 | HEART RATE: 78 BPM

## 2019-10-09 DIAGNOSIS — G25.0 ESSENTIAL TREMOR: Primary | ICD-10-CM

## 2019-10-09 DIAGNOSIS — I21.4 NSTEMI (NON-ST ELEVATED MYOCARDIAL INFARCTION) (HCC): ICD-10-CM

## 2019-10-09 DIAGNOSIS — Z95.5 STENTED CORONARY ARTERY: ICD-10-CM

## 2019-10-09 PROCEDURE — 99215 OFFICE O/P EST HI 40 MIN: CPT | Performed by: PSYCHIATRY & NEUROLOGY

## 2019-10-09 PROCEDURE — 93798 PHYS/QHP OP CAR RHAB W/ECG: CPT

## 2019-10-09 NOTE — LETTER
October 9, 2019     [place] [place], [address]    Patient: Isabela Muniz   YOB: 1960   Date of Visit: 10/9/2019       Dear Dr Antoine Granado:    Earlier today I saw Mr Chi Farias for evaluation of his essential tremor  Below are my notes for this visit for your records and to keep you updated on his health status  If you have questions, please do not hesitate to call me  I look forward to following your patient along with you  Sincerely,        Mortimer Munda, MD        CC: No Recipients  Mortimer Munda, MD  10/9/2019 11:40 PM  Sign at close encounter  [address] [facility] PATIENT NOTE    Patient: Isabela Muniz  Medical Record Number: # [id number]  YOB: 1960  Date of visit: 10/9/2019    Referring provider: Hortencia ref  provider found    ASSESSMENT     Diagnoses for this encounter:  1  Essential tremor       Impression of this 61 yo gentleman with more than 20 years of likely familial essential tremor, improved on primidone  He did have NSTEMI in the interim complicating this symptoms but he feels better than before  He has been placed on ticagrelor more recently after primidone was started, and since the two have a possible drug reaction we will continue the dose the same instead of increasing it  Should we notice his tremor control diminishing, will consider switching to topiramate    PLAN     · Will continue the primidone 50mg qHS then since this has improved tremor control satisfactorily and he is on Brilinta (ticagrelor) which can interact with this medication if increased further  · He will recover further from his recent NSTEMI  · The patient has been instructed to call us about any new neurological problems or medication side effects    · Return to Clinic in 4 months    A total of 40 minutes were spent face-to-face with this patient, of which 25% was spent on counseling and coordination of care  HISTORY OF PRESENT ILLNESS:     Mr  Janee Acevedo is a 60 y o  right handed male who has been referred to the [facility] [address] [facility] for evaluation of tremor of hands  Last visit 5/23/19  The patient was accompanied today  History was obtained from patient and daughter  Referred by PCP Dr Leila Mccullough  Interim History  No interim calls to office  He suffered an NSTEMI in late Aug 2019  He says he started taking the primidone only at night instead of also in the day, because "I think it made me jumpy"  He feels his writing has been better, shakiness has "gotten a lot better"  He does feel it harder to take deep breaths and sometimes a little short of breath  He has cut out smoking cigarettes entirely  INITIAL HISTORY  He say back at least 20+ years ago he his shaking was barely noticeable but now he would not be able to order soup and his handwriting is less legible  He cannot carry a cup without spilling contents  Shaking is worse in the morning  He thinks it started first with the R hand followed by the L hand two years later  He endorses neck tremor starting in 2018  Denies lip/tongue/voice/jaw tremor  Hx of tremors in her mother and his maternal grandmother  He endorses shaking during action rather than rest  He says she has no pain in the neck or arms  He denies ever having evaluation for the tremors and no prior treatment initiated  Current Relevant Medications:   Living Situation + ADLs: He is able to perform all his ADls and IADLs        REVIEW OF PAST MEDICAL, SOCIAL AND FAMILY HISTORY:  This is the list of problems as per our Medical Records:    Patient Active Problem List    Diagnosis Date Noted    Microalbuminuria 10/03/2019    NSTEMI (non-ST elevated myocardial infarction) ([facility] [address] ) 08/24/2019    Essential tremor 08/24/2019    Tremor of both hands 05/14/2019    Pharyngitis due to Streptococcus species 01/29/2019    Acute non-recurrent maxillary sinusitis 11/30/2018    Acute non-recurrent frontal sinusitis 04/25/2018    Bronchitis 04/04/2018    HTN (hypertension) 03/17/2017    Tobacco abuse 06/28/2016    Erectile dysfunction 03/03/2016    Type 2 diabetes mellitus ([facility] [address] ) 01/02/2013    Mixed hyperlipidemia 11/02/2010     Allergies   Allergen Reactions    Penicillins Throat Swelling    Shellfish Allergy Throat Swelling        Outpatient Encounter Medications as of 10/9/2019   Medication Sig Dispense Refill    albuterol (PROAIR HFA) 90 mcg/act inhaler Inhale 2 puffs every 4 (four) hours as needed for shortness of breath 1 each 0    aspirin (ECOTRIN LOW STRENGTH) 81 mg EC tablet Take 1 tablet (81 mg total) by mouth daily 30 tablet 0    ezetimibe (ZETIA) 10 mg tablet Take 1 tablet (10 mg total) by mouth daily 90 tablet 1    fluticasone (FLONASE) 50 mcg/act nasal spray 2 sprays into each nostril daily (Patient taking differently: 2 sprays into each nostril daily as needed ) 1 Bottle 11    losartan (COZAAR) 25 mg tablet Take 1 tablet (25 mg total) by mouth daily 30 tablet 3    metFORMIN (GLUCOPHAGE) 1000 MG tablet Take 1 tablet (1,000 mg total) by mouth 2 (two) times a day 180 tablet 1    metoprolol tartrate (LOPRESSOR) 50 mg tablet Take 1 tablet (50 mg total) by mouth every 12 (twelve) hours 180 tablet 1    niacin (NIASPAN) 1000 MG CR tablet Take 1 tablet (1,000 mg total) by mouth daily at bedtime 90 tablet 1    nitroglycerin (NITROSTAT) 0 4 mg SL tablet Place 1 tablet (0 4 mg total) under the tongue every 5 (five) minutes as needed for chest pain 30 tablet 0    primidone (MYSOLINE) 50 mg tablet Take 2 tablets (100 mg total) by mouth every 12 (twelve) hours (Patient taking differently: Take 100 mg by mouth daily at bedtime ) 120 tablet 3    rosuvastatin (CRESTOR) 40 MG tablet Take 1 tablet (40 mg total) by mouth daily 90 tablet 1    ticagrelor (BRILINTA) 90 MG Take 1 tablet (90 mg total) by mouth every 12 (twelve) hours 180 tablet 1    nicotine (NICODERM CQ) 21 mg/24 hr TD 24 hr patch Place 1 patch on the skin daily (Patient not taking: Reported on 9/18/2019) 30 patch 0     No facility-administered encounter medications on file as of 10/9/2019  REVIEW OF SYSTEMS:  The patient has entered data on an intake form regarding present illness, past medical and surgical history, medications, allergies, family and social history, and a full review of 14 systems  I have reviewed this form with the patient, and all the relevant information has been included on this note  The full review of systems was negative except as stated in HPI and below  Constitutional: Negative  Negative for appetite change and fever  HENT: Negative  Negative for hearing loss, tinnitus, trouble swallowing and voice change  Eyes: Negative  Negative for photophobia and pain  Respiratory: Negative  Negative for shortness of breath  Cardiovascular: Positive for chest pain  Negative for palpitations  Status post Heart Attack in August, was hospitalized for 5 days- [facility]  Gastrointestinal: Negative  Negative for nausea and vomiting  Endocrine: Negative  Negative for cold intolerance and heat intolerance  Genitourinary: Negative  Negative for dysuria, frequency and urgency  Musculoskeletal: Negative  Negative for myalgias and neck pain  Skin: Negative  Negative for rash  Neurological: Positive for dizziness, tremors (are a little better ) and light-headedness  Negative for seizures, syncope, facial asymmetry, speech difficulty, weakness and numbness  Hematological: Negative  Does not bruise/bleed easily  Psychiatric/Behavioral: Positive for sleep disturbance (Wakes up at night )  Negative for confusion and hallucinations       FOCUSED PHYSICAL EXAMINATION:     Vital signs:  /61 (BP Location: Left arm, Patient Position: Sitting, Cuff Size: Standard)   Pulse 78   Ht 6' 1" (1 854 m)   Wt 97 5 kg (215 lb)   BMI 28 37 kg/m² General:  Well-appearing, well nourished, pleasant patient in no acute distress  Mood and Fund of Knowledge are appropriate  Head:  Normocephalic, atraumatic  Oropharynx and conjunctiva are clear  Speech  No hypophonia, no bradylalia  No scanning speech  +Slight slurring  Language: Comprehension intact  Neck:  Supple, strong 5/5 forward flexion and retroflexion  Extremities: Range of motion is normal       Cognitive and Mental Exam:  The patient is alert, oriented to self, location, date and situation  Memory is normal to provide accurate details of health history    Cranial Nerves:  CN II:  Direct and consensual light reflexes were equally reactive to light symmetrically  No afferent pupillary defect   Visual fields are full to confrontation  CN III / IV / VI: Extraocular movements were full, with normal pursuit and saccades  CN V:   Facial sensation to light touch was intact  CN VII: Face is symmetric with normal strength  CN VIII: Hearing was assessed using the Calibrated Finger Rub Auditory Screening Test (CALFRAST) and was not abnormal (Better than CALFRAST-Strong-70)  CN X:   Palate is up going bilaterally and symmetrically  CN XI:  Neck muscles are strong  CN XII: Tongue protrusion is at midline with normal movements  No dysarthria  Motor:    Spiral Drawing:  Directional and regular waviness in either hand, smaller amplitude compared to May 2019 tracing  R and L approximately equal, and regular spaces  Handwriting: small but equally spaced, tremors in lettering but more articulation of individual letters compared to the last time  Dot-to-Dot: regular wave pattern in both hands R>L       No head tremor    UPDRSIII                Time since last dose:    5/23/19   10/09/19   Speech  1 dysarthric   1 dysarthric   Facial Expression  1 1   Postural Tremor (Right) 2 1   Postural Tremor (Left) 2 1   Kinetic Tremor (Right)  2 1   Kinetic Tremor (Left)  2 1   Rest tremor amplitude RUE 0 0   Rest tremor amplitude LUE 0 0   Rest tremor amplitude RLE 0 0   Rest tremor amplitude LLE 0 0   Lip/Jaw Tremor  0 1   Consistency of tremor 0 0   Finger Taps (Right)   1 -   Finger Taps (Left)  1 -   Hand Movement (Right)  1 -   Hand Movement (Left)   1 -   Pronation/Supination (Right)  1 -   Pronation/Supination (Left)   1 -   Toe Tapping (Right) - -   Toe Tapping (Left) - -   Leg Agility (Right)  - -   Leg Agility (Left)   -   -   Rigidity - Neck  0   -   Rigidity - Upper Extremity (Right)  0   -   Rigidity - Upper Extremity (Left)   0   -   Rigidity - Lower Extremity (Right)  0 -   Rigidity - Lower Extremity (Left)   0 -   Arising from Chair   0   0    Gait    1   0   Freezing of Gait 0   0   Postural Stability  -   -   Posture 0   0   Global spontaneity of movement 1 reduced arm swing bilat   0     -------------------------------------------------------------------------------------    Muscle Strength Right Left  Muscle Strength Right Left   Deltoid 5/5 5/5  Hip Adductors 5/5 5/5   Biceps 5/5 5/5  Hip Abductors 5/5 5/5   Triceps 5/5 5/5  Knee Extensors 5/5 5/5   Wrist Extensors 5/5 5/5  Knee Flexors 5/5 5/5   Wrist Flexors 5/5 5/5  Ankle Extensors 5/5 5/5    5/5 5/5  Ankle Flexors 5/5 5/5   Finger Abductors 5/5 5/5       Hip Flexors 5/5 5/5   Hip Extensors 5/5 5/5     Coordination:  Finger-to-nose-finger: normal with mild kinetic tremor both side and worse than the L than the R       Gait:  Normal comprehensive gait evaluation, has normal raising, stance, gait, turns     Reflexes:    Right Left   Biceps 2/4 2/4   Brachioradialis 2/4 2/4   Triceps 2/4 2/4   Knee 2/4 2/4   Ankle 2/4 2/4

## 2019-10-09 NOTE — PROGRESS NOTES
Review of Systems   Constitutional: Negative  Negative for appetite change and fever  HENT: Negative  Negative for hearing loss, tinnitus, trouble swallowing and voice change  Eyes: Negative  Negative for photophobia and pain  Respiratory: Negative  Negative for shortness of breath  Cardiovascular: Positive for chest pain  Negative for palpitations  Status post Heart Attack in August, was hospitalized for 5 days- [facility]  Gastrointestinal: Negative  Negative for nausea and vomiting  Endocrine: Negative  Negative for cold intolerance and heat intolerance  Genitourinary: Negative  Negative for dysuria, frequency and urgency  Musculoskeletal: Negative  Negative for myalgias and neck pain  Skin: Negative  Negative for rash  Neurological: Positive for dizziness, tremors (are a little better ) and light-headedness  Negative for seizures, syncope, facial asymmetry, speech difficulty, weakness and numbness  Hematological: Negative  Does not bruise/bleed easily  Psychiatric/Behavioral: Positive for sleep disturbance (Wakes  up at night )  Negative for confusion and hallucinations

## 2019-10-09 NOTE — PATIENT INSTRUCTIONS
· Will continue the primidone 50mg qHS then since this has improved tremor control satisfactorily and he is on Brilinta (ticagrelor) which can interact with this medication if increased further  · He will recover further from his recent NSTEMI  · The patient has been instructed to call us about any new neurological problems or medication side effects    · Return to Clinic in 4 months

## 2019-10-11 ENCOUNTER — CLINICAL SUPPORT (OUTPATIENT)
Dept: CARDIAC REHAB | Age: 59
End: 2019-10-11
Payer: COMMERCIAL

## 2019-10-11 DIAGNOSIS — I21.4 NSTEMI (NON-ST ELEVATED MYOCARDIAL INFARCTION) (HCC): ICD-10-CM

## 2019-10-11 PROCEDURE — 93798 PHYS/QHP OP CAR RHAB W/ECG: CPT

## 2019-10-14 ENCOUNTER — APPOINTMENT (OUTPATIENT)
Dept: CARDIAC REHAB | Age: 59
End: 2019-10-14
Payer: COMMERCIAL

## 2019-10-16 ENCOUNTER — CLINICAL SUPPORT (OUTPATIENT)
Dept: CARDIAC REHAB | Age: 59
End: 2019-10-16
Payer: COMMERCIAL

## 2019-10-16 DIAGNOSIS — Z95.5 STENTED CORONARY ARTERY: ICD-10-CM

## 2019-10-16 DIAGNOSIS — I21.4 NSTEMI (NON-ST ELEVATED MYOCARDIAL INFARCTION) (HCC): ICD-10-CM

## 2019-10-16 PROCEDURE — 93798 PHYS/QHP OP CAR RHAB W/ECG: CPT

## 2019-10-17 ENCOUNTER — OFFICE VISIT (OUTPATIENT)
Dept: CARDIOLOGY CLINIC | Facility: CLINIC | Age: 59
End: 2019-10-17
Payer: COMMERCIAL

## 2019-10-17 VITALS
SYSTOLIC BLOOD PRESSURE: 110 MMHG | WEIGHT: 218 LBS | HEIGHT: 73 IN | DIASTOLIC BLOOD PRESSURE: 60 MMHG | HEART RATE: 72 BPM | BODY MASS INDEX: 28.89 KG/M2

## 2019-10-17 DIAGNOSIS — E78.2 MIXED HYPERLIPIDEMIA: ICD-10-CM

## 2019-10-17 DIAGNOSIS — I25.10 CORONARY ARTERY DISEASE INVOLVING NATIVE CORONARY ARTERY OF NATIVE HEART WITHOUT ANGINA PECTORIS: ICD-10-CM

## 2019-10-17 DIAGNOSIS — I10 ESSENTIAL HYPERTENSION: ICD-10-CM

## 2019-10-17 DIAGNOSIS — I21.4 NSTEMI (NON-ST ELEVATED MYOCARDIAL INFARCTION) (HCC): Primary | ICD-10-CM

## 2019-10-17 PROCEDURE — 99214 OFFICE O/P EST MOD 30 MIN: CPT | Performed by: INTERNAL MEDICINE

## 2019-10-17 RX ORDER — GLIMEPIRIDE 2 MG/1
1 TABLET ORAL
COMMUNITY
End: 2019-10-30 | Stop reason: ALTCHOICE

## 2019-10-17 NOTE — PROGRESS NOTES
Cardiology Follow Up    Sander Mckeon  [birth year]  [id number]  [facility] [address]  [address]  [phone]    1  NSTEMI (non-ST elevated myocardial infarction) ([facility] [address] )     2  Essential hypertension     3  Mixed hyperlipidemia     4  Coronary artery disease involving native coronary artery of native heart without angina pectoris           Discussion/Summary: All of his assessed cardiac problems are stable  I have reviewed his medications and made no changes  No cardiac testing is ordered  RTO 9 months  I stressed to him that he cannot go back to smoking and that he needs to continue an exercise program after he completes cardiac rehab  Interval History: He is doing well since his anterior wall MI and LAD stenting on 8/26/2019  He is in cardiac rehab and progressing well  He has reproducible CP  He has stopped smoking tobacco and marijuana  BP is well controlled  He is on potent statin therapy  He takes all of his medications faithfully including his ASA and Brilinta      Patient Active Problem List   Diagnosis    Erectile dysfunction    HTN (hypertension)    Mixed hyperlipidemia    Type 2 diabetes mellitus (HCC)    Tobacco abuse    Bronchitis    Acute non-recurrent frontal sinusitis    Acute non-recurrent maxillary sinusitis    Pharyngitis due to Streptococcus species    Tremor of both hands    NSTEMI (non-ST elevated myocardial infarction) (HCC)    Essential tremor    Microalbuminuria    Coronary artery disease involving native coronary artery of native heart without angina pectoris     Past Medical History:   Diagnosis Date    Asthma     Coronary artery disease     Diabetes ([facility] [address] )     HTN (hypertension)     Hypercholesteremia     Myocardial infarction [facility])      Social History     Socioeconomic History    Marital status: /Civil Union     Spouse name: Not on file  Number of children: Not on file    Years of education: Not on file    Highest education level: Not on file   Occupational History    Not on file   Social Needs    Financial resource strain: Not on file    Food insecurity:     Worry: Not on file     Inability: Not on file    Transportation needs:     Medical: Not on file     Non-medical: Not on file   Tobacco Use    Smoking status: Former Smoker     Packs/day: 1 00     Years: 40 00     Pack years: 40 00     Types: Cigarettes     Last attempt to quit: 2019     Years since quittin 1    Smokeless tobacco: Never Used    Tobacco comment: he has quit several times before   Substance and Sexual Activity    Alcohol use: Never     Frequency: Never    Drug use: Yes     Types: Marijuana    Sexual activity: Not Currently   Lifestyle    Physical activity:     Days per week: Not on file     Minutes per session: Not on file    Stress: Not on file   Relationships    Social connections:     Talks on phone: Not on file     Gets together: Not on file     Attends Denominational service: Not on file     Active member of club or organization: Not on file     Attends meetings of clubs or organizations: Not on file     Relationship status: Not on file    Intimate partner violence:     Fear of current or ex partner: Not on file     Emotionally abused: Not on file     Physically abused: Not on file     Forced sexual activity: Not on file   Other Topics Concern    Not on file   Social History Narrative    Not on file      Family History   Problem Relation Age of Onset    Liver cancer Father     Skin cancer Father     Tremor Mother     Tremor Maternal Grandmother      Past Surgical History:   Procedure Laterality Date    CARDIAC CATHETERIZATION      COLONOSCOPY      RESOLVED:     CORONARY STENT PLACEMENT      MASS EXCISION Right     post auricular cyst removal - in office - Dr Ray Sousa - 6/10/19    TONSILLECTOMY         Current Outpatient Medications:   Víctor Moose albuterol (PROAIR HFA) 90 mcg/act inhaler, Inhale 2 puffs every 4 (four) hours as needed for shortness of breath, Disp: 1 each, Rfl: 0    aspirin (ECOTRIN LOW STRENGTH) 81 mg EC tablet, Take 1 tablet (81 mg total) by mouth daily, Disp: 30 tablet, Rfl: 0    ezetimibe (ZETIA) 10 mg tablet, Take 1 tablet (10 mg total) by mouth daily, Disp: 90 tablet, Rfl: 1    fluticasone (FLONASE) 50 mcg/act nasal spray, 2 sprays into each nostril daily (Patient taking differently: 2 sprays into each nostril daily as needed ), Disp: 1 Bottle, Rfl: 11    glimepiride (AMARYL) 2 mg tablet, Take 2 mg by mouth every morning before breakfast, Disp: , Rfl:     losartan (COZAAR) 25 mg tablet, Take 1 tablet (25 mg total) by mouth daily, Disp: 30 tablet, Rfl: 3    metFORMIN (GLUCOPHAGE) 1000 MG tablet, Take 1 tablet (1,000 mg total) by mouth 2 (two) times a day, Disp: 180 tablet, Rfl: 1    metoprolol tartrate (LOPRESSOR) 50 mg tablet, Take 1 tablet (50 mg total) by mouth every 12 (twelve) hours, Disp: 180 tablet, Rfl: 1    niacin (NIASPAN) 1000 MG CR tablet, Take 1 tablet (1,000 mg total) by mouth daily at bedtime, Disp: 90 tablet, Rfl: 1    primidone (MYSOLINE) 50 mg tablet, Take 2 tablets (100 mg total) by mouth every 12 (twelve) hours (Patient taking differently: Take 100 mg by mouth daily at bedtime ), Disp: 120 tablet, Rfl: 3    rosuvastatin (CRESTOR) 40 MG tablet, Take 1 tablet (40 mg total) by mouth daily, Disp: 90 tablet, Rfl: 1    ticagrelor (BRILINTA) 90 MG, Take 1 tablet (90 mg total) by mouth every 12 (twelve) hours, Disp: 180 tablet, Rfl: 1    nitroglycerin (NITROSTAT) 0 4 mg SL tablet, Place 1 tablet (0 4 mg total) under the tongue every 5 (five) minutes as needed for chest pain (Patient not taking: Reported on 10/17/2019), Disp: 30 tablet, Rfl: 0  Allergies   Allergen Reactions    Penicillins Throat Swelling    Shellfish Allergy Throat Swelling     Vitals:    10/17/19 1354   BP: 110/60   BP Location: Right arm Patient Position: Sitting   Cuff Size: Large   Pulse: 72   Weight: 98 9 kg (218 lb)   Height: 6' 1" (1 854 m)     Weight (last 2 days)     Date/Time   Weight    10/17/19 1354   98 9 (218)             Blood pressure 110/60, pulse 72, height 6' 1" (1 854 m), weight 98 9 kg (218 lb)  , Body mass index is 28 76 kg/m²  Labs:  Office Visit on 09/18/2019   Component Date Value    GLUCOSE BLD 09/18/2019 43     Creatinine, Ur 09/18/2019 138 0     Microalbum  ,U,Random 09/18/2019 70 9*    Microalb Creat Ratio 09/18/2019 51*   Admission on 08/24/2019, Discharged on 08/28/2019   Component Date Value    WBC 08/24/2019 7 40     RBC 08/24/2019 4 27     Hemoglobin 08/24/2019 13 5     Hematocrit 08/24/2019 40 4     MCV 08/24/2019 95     MCH 08/24/2019 31 6     MCHC 08/24/2019 33 4     RDW 08/24/2019 11 9     MPV 08/24/2019 9 9     Platelets 71/67/4563 195     nRBC 08/24/2019 0     Neutrophils Relative 08/24/2019 56     Immat GRANS % 08/24/2019 0     Lymphocytes Relative 08/24/2019 30     Monocytes Relative 08/24/2019 8     Eosinophils Relative 08/24/2019 5     Basophils Relative 08/24/2019 1     Neutrophils Absolute 08/24/2019 4 14     Immature Grans Absolute 08/24/2019 0 03     Lymphocytes Absolute 08/24/2019 2 20     Monocytes Absolute 08/24/2019 0 58     Eosinophils Absolute 08/24/2019 0 37     Basophils Absolute 08/24/2019 0 08     Sodium 08/24/2019 138     Potassium 08/24/2019 5 8*    Chloride 08/24/2019 106     CO2 08/24/2019 27     ANION GAP 08/24/2019 5     BUN 08/24/2019 13     Creatinine 08/24/2019 0 76     Glucose 08/24/2019 130     Calcium 08/24/2019 8 9     AST 08/24/2019 48*    ALT 08/24/2019 26     Alkaline Phosphatase 08/24/2019 65     Total Protein 08/24/2019 7 6     Albumin 08/24/2019 3 4*    Total Bilirubin 08/24/2019 0 79     eGFR 08/24/2019 99     Troponin I 08/24/2019 0 39*    Potassium 08/24/2019 4 3     Protime 08/24/2019 13 6     INR 08/24/2019 1 03     PTT 08/24/2019 39*    Troponin I 08/24/2019 0 78*    Hemoglobin A1C 08/24/2019 6 4*    EAG 08/24/2019 137     Hepatitis C Ab 08/24/2019 Non-reactive     POC Glucose 08/24/2019 99     PTT 08/24/2019 52*    Troponin I 08/24/2019 1 22*    POC Glucose 08/24/2019 189*    PTT 08/25/2019 59*    WBC 08/25/2019 10 78*    RBC 08/25/2019 4 24     Hemoglobin 08/25/2019 13 4     Hematocrit 08/25/2019 39 7     MCV 08/25/2019 94     MCH 08/25/2019 31 6     MCHC 08/25/2019 33 8     RDW 08/25/2019 11 7     MPV 08/25/2019 9 7     Platelets 07/59/7257 191     nRBC 08/25/2019 0     Neutrophils Relative 08/25/2019 63     Immat GRANS % 08/25/2019 1     Lymphocytes Relative 08/25/2019 25     Monocytes Relative 08/25/2019 6     Eosinophils Relative 08/25/2019 4     Basophils Relative 08/25/2019 1     Neutrophils Absolute 08/25/2019 6 88     Immature Grans Absolute 08/25/2019 0 05     Lymphocytes Absolute 08/25/2019 2 70     Monocytes Absolute 08/25/2019 0 64     Eosinophils Absolute 08/25/2019 0 45     Basophils Absolute 08/25/2019 0 06     Troponin I 08/25/2019 1 53*    Sodium 08/25/2019 137     Potassium 08/25/2019 3 8     Chloride 08/25/2019 104     CO2 08/25/2019 28     ANION GAP 08/25/2019 5     BUN 08/25/2019 13     Creatinine 08/25/2019 0 88     Glucose 08/25/2019 129     Calcium 08/25/2019 9 2     eGFR 08/25/2019 93     PTT 08/25/2019 66*    POC Glucose 08/25/2019 158*    POC Glucose 08/25/2019 198*    PTT 08/25/2019 62*    POC Glucose 08/25/2019 68     Ventricular Rate 08/24/2019 73     Atrial Rate 08/24/2019 73     MD Interval 08/24/2019 166     QRSD Interval 08/24/2019 92     QT Interval 08/24/2019 374     QTC Interval 08/24/2019 412     P Axis 08/24/2019 71     QRS Axis 08/24/2019 13     T Wave Axis 08/24/2019 55     POC Glucose 08/25/2019 120     PTT 08/26/2019 67*    POC Glucose 08/26/2019 227*    Activated Clotting Time,* 08/26/2019 212*    Specimen Type 08/26/2019 VENOUS     POC Glucose 08/26/2019 174*    WBC 08/26/2019 14 35*    RBC 08/26/2019 4 84     Hemoglobin 08/26/2019 15 0     Hematocrit 08/26/2019 44 8     MCV 08/26/2019 93     MCH 08/26/2019 31 0     MCHC 08/26/2019 33 5     RDW 08/26/2019 11 8     MPV 08/26/2019 9 7     Platelets 47/41/4562 248     nRBC 08/26/2019 0     Neutrophils Relative 08/26/2019 82*    Immat GRANS % 08/26/2019 1     Lymphocytes Relative 08/26/2019 13*    Monocytes Relative 08/26/2019 4     Eosinophils Relative 08/26/2019 0     Basophils Relative 08/26/2019 0     Neutrophils Absolute 08/26/2019 11 74*    Immature Grans Absolute 08/26/2019 0 15     Lymphocytes Absolute 08/26/2019 1 82     Monocytes Absolute 08/26/2019 0 61     Eosinophils Absolute 08/26/2019 0 00     Basophils Absolute 08/26/2019 0 03     Sodium 08/26/2019 135*    Potassium 08/26/2019 4 5     Chloride 08/26/2019 101     CO2 08/26/2019 26     ANION GAP 08/26/2019 8     BUN 08/26/2019 13     Creatinine 08/26/2019 0 86     Glucose 08/26/2019 154*    Calcium 08/26/2019 9 5     eGFR 08/26/2019 94     Troponin I 08/26/2019 0 62*    POC Glucose 08/26/2019 150*    Sodium 08/27/2019 135*    Potassium 08/27/2019 4 0     Chloride 08/27/2019 103     CO2 08/27/2019 26     ANION GAP 08/27/2019 6     BUN 08/27/2019 15     Creatinine 08/27/2019 0 85     Glucose 08/27/2019 141*    Calcium 08/27/2019 9 1     eGFR 08/27/2019 95     WBC 08/27/2019 14 34*    RBC 08/27/2019 4 13     Hemoglobin 08/27/2019 13 1     Hematocrit 08/27/2019 38 6     MCV 08/27/2019 94     MCH 08/27/2019 31 7     MCHC 08/27/2019 33 9     RDW 08/27/2019 11 9     MPV 08/27/2019 9 8     Platelets 18/07/3057 205     nRBC 08/27/2019 0     Neutrophils Relative 08/27/2019 73     Immat GRANS % 08/27/2019 1     Lymphocytes Relative 08/27/2019 17     Monocytes Relative 08/27/2019 8     Eosinophils Relative 08/27/2019 1     Basophils Relative 08/27/2019 0     Neutrophils Absolute 08/27/2019 10 36*    Immature Grans Absolute 08/27/2019 0 11     Lymphocytes Absolute 08/27/2019 2 49     Monocytes Absolute 08/27/2019 1 13     Eosinophils Absolute 08/27/2019 0 20     Basophils Absolute 08/27/2019 0 05     Troponin I 08/27/2019 8 00*    POC Glucose 08/27/2019 184*    POC Glucose 08/27/2019 190*    POC Glucose 08/27/2019 97     Ventricular Rate 08/27/2019 58     Atrial Rate 08/27/2019 58     ME Interval 08/27/2019 172     QRSD Interval 08/27/2019 98     QT Interval 08/27/2019 426     QTC Interval 08/27/2019 418     P Axis 08/27/2019 68     QRS Axis 08/27/2019 19     T Wave Axis 08/27/2019 66     Ventricular Rate 08/28/2019 78     Atrial Rate 08/28/2019 78     ME Interval 08/28/2019 154     QRSD Interval 08/28/2019 86     QT Interval 08/28/2019 366     QTC Interval 08/28/2019 417     P Axis 08/28/2019 70     QRS Axis 08/28/2019 27     T Wave Axis 08/28/2019 69     POC Glucose 08/28/2019 127     POC Glucose 08/28/2019 139    Office Visit on 08/24/2019   Component Date Value    Ventricular Rate 08/24/2019 71     Atrial Rate 08/24/2019 71     ME Interval 08/24/2019 152     QRSD Interval 08/24/2019 98     QT Interval 08/24/2019 388     QTC Interval 08/24/2019 421     P Axis 08/24/2019 70     QRS Axis 08/24/2019 16     T Wave Axis 08/24/2019 46     Ventricular Rate 08/24/2019 71     Atrial Rate 08/24/2019 71     ME Interval 08/24/2019 152     QRSD Interval 08/24/2019 98     QT Interval 08/24/2019 388     QTC Interval 08/24/2019 421     P Axis 08/24/2019 70     QRS Axis 08/24/2019 16     T Wave Axis 08/24/2019 46    Procedure visit on 06/10/2019   Component Date Value    Case Report 06/10/2019                      Value:Surgical Pathology Report                         Case: [id number]                                   Authorizing Provider:  Hazel Gary DO          Collected:           06/10/2019 1456              Ordering Location:     [facility]   Received:            06/10/2019 1457              Pathologist:           Bela Vargas MD                                                                 Specimen:    Cyst, right ear                                                                            Final Diagnosis 06/10/2019                      Value: This result contains rich text formatting which cannot be displayed here   Additional Information 06/10/2019                      Value: This result contains rich text formatting which cannot be displayed here  [facility] Description 06/10/2019                      Value: This result contains rich text formatting which cannot be displayed here   Clinical Information 06/10/2019                      Value:chondroma? ?? cyst???     Imaging: No results found  Review of Systems:  Review of Systems   Constitutional: Negative for diaphoresis, fatigue, fever and unexpected weight change  HENT: Negative  Respiratory: Negative for cough, shortness of breath and wheezing  Cardiovascular: Negative for chest pain, palpitations and leg swelling  Gastrointestinal: Negative for abdominal pain, diarrhea and nausea  Musculoskeletal: Negative for gait problem and myalgias  Skin: Negative for rash  Neurological: Negative for dizziness and numbness  Psychiatric/Behavioral: Negative  Physical Exam:  Physical Exam   Constitutional: He is oriented to person, place, and time  He appears well-developed and well-nourished  HENT:   Head: Normocephalic and atraumatic  Eyes: Pupils are equal, round, and reactive to light  Neck: Normal range of motion  Neck supple  No JVD present  Cardiovascular: Regular rhythm, S1 normal, S2 normal and normal pulses  Pulses:       Carotid pulses are 2+ on the right side, and 2+ on the left side  Pulmonary/Chest: Effort normal and breath sounds normal  He has no wheezes  He has no rales  Abdominal: Soft   Bowel sounds are normal  There is no tenderness  Musculoskeletal: Normal range of motion  He exhibits no edema or tenderness  Neurological: He is alert and oriented to person, place, and time  He has normal reflexes  No cranial nerve deficit  Skin: Skin is warm  Psychiatric: He has a normal mood and affect

## 2019-10-18 ENCOUNTER — CLINICAL SUPPORT (OUTPATIENT)
Dept: CARDIAC REHAB | Age: 59
End: 2019-10-18
Payer: COMMERCIAL

## 2019-10-18 DIAGNOSIS — I21.4 NSTEMI (NON-ST ELEVATED MYOCARDIAL INFARCTION) (HCC): ICD-10-CM

## 2019-10-18 DIAGNOSIS — Z95.5 STENTED CORONARY ARTERY: ICD-10-CM

## 2019-10-18 PROCEDURE — 93798 PHYS/QHP OP CAR RHAB W/ECG: CPT

## 2019-10-21 ENCOUNTER — APPOINTMENT (OUTPATIENT)
Dept: CARDIAC REHAB | Age: 59
End: 2019-10-21
Payer: COMMERCIAL

## 2019-10-22 DIAGNOSIS — Z95.5 S/P CORONARY ARTERY STENT PLACEMENT: ICD-10-CM

## 2019-10-22 RX ORDER — LOSARTAN POTASSIUM 25 MG/1
25 TABLET ORAL DAILY
Qty: 90 TABLET | Refills: 1
Start: 2019-10-22 | End: 2019-12-31 | Stop reason: SDUPTHER

## 2019-10-23 ENCOUNTER — CLINICAL SUPPORT (OUTPATIENT)
Dept: CARDIAC REHAB | Age: 59
End: 2019-10-23
Payer: COMMERCIAL

## 2019-10-23 DIAGNOSIS — I21.4 NSTEMI (NON-ST ELEVATED MYOCARDIAL INFARCTION) (HCC): ICD-10-CM

## 2019-10-23 DIAGNOSIS — Z95.5 STENTED CORONARY ARTERY: ICD-10-CM

## 2019-10-23 PROCEDURE — 93798 PHYS/QHP OP CAR RHAB W/ECG: CPT

## 2019-10-25 ENCOUNTER — CLINICAL SUPPORT (OUTPATIENT)
Dept: CARDIAC REHAB | Age: 59
End: 2019-10-25
Payer: COMMERCIAL

## 2019-10-25 DIAGNOSIS — I21.4 NSTEMI (NON-ST ELEVATED MYOCARDIAL INFARCTION) (HCC): ICD-10-CM

## 2019-10-25 DIAGNOSIS — Z95.5 STENTED CORONARY ARTERY: ICD-10-CM

## 2019-10-25 PROCEDURE — 93798 PHYS/QHP OP CAR RHAB W/ECG: CPT

## 2019-10-28 ENCOUNTER — CLINICAL SUPPORT (OUTPATIENT)
Dept: CARDIAC REHAB | Age: 59
End: 2019-10-28
Payer: COMMERCIAL

## 2019-10-28 DIAGNOSIS — I21.4 NSTEMI (NON-ST ELEVATED MYOCARDIAL INFARCTION) (HCC): ICD-10-CM

## 2019-10-28 DIAGNOSIS — Z95.5 STENTED CORONARY ARTERY: ICD-10-CM

## 2019-10-28 PROCEDURE — 93798 PHYS/QHP OP CAR RHAB W/ECG: CPT

## 2019-10-29 ENCOUNTER — TELEPHONE (OUTPATIENT)
Dept: CARDIOLOGY CLINIC | Facility: CLINIC | Age: 59
End: 2019-10-29

## 2019-10-29 NOTE — TELEPHONE ENCOUNTER
Received a fax for a "treament Plan"  Dr Zaina Cooper stated that I should fax the office note from 10/17/19    Faxed to [phone]

## 2019-10-30 ENCOUNTER — OFFICE VISIT (OUTPATIENT)
Dept: ENDOCRINOLOGY | Facility: CLINIC | Age: 59
End: 2019-10-30
Payer: COMMERCIAL

## 2019-10-30 ENCOUNTER — CLINICAL SUPPORT (OUTPATIENT)
Dept: CARDIAC REHAB | Age: 59
End: 2019-10-30
Payer: COMMERCIAL

## 2019-10-30 VITALS
HEIGHT: 73 IN | WEIGHT: 216.4 LBS | HEART RATE: 74 BPM | DIASTOLIC BLOOD PRESSURE: 80 MMHG | SYSTOLIC BLOOD PRESSURE: 112 MMHG | BODY MASS INDEX: 28.68 KG/M2

## 2019-10-30 DIAGNOSIS — Z13.21 ENCOUNTER FOR VITAMIN DEFICIENCY SCREENING: ICD-10-CM

## 2019-10-30 DIAGNOSIS — I10 ESSENTIAL HYPERTENSION: ICD-10-CM

## 2019-10-30 DIAGNOSIS — Z13.29 SCREENING FOR THYROID DISORDER: ICD-10-CM

## 2019-10-30 DIAGNOSIS — Z95.5 STENTED CORONARY ARTERY: ICD-10-CM

## 2019-10-30 DIAGNOSIS — E11.00 TYPE 2 DIABETES MELLITUS WITH HYPEROSMOLARITY WITHOUT COMA, WITHOUT LONG-TERM CURRENT USE OF INSULIN (HCC): Primary | ICD-10-CM

## 2019-10-30 DIAGNOSIS — I21.4 NSTEMI (NON-ST ELEVATED MYOCARDIAL INFARCTION) (HCC): ICD-10-CM

## 2019-10-30 PROCEDURE — 93798 PHYS/QHP OP CAR RHAB W/ECG: CPT

## 2019-10-30 PROCEDURE — 99204 OFFICE O/P NEW MOD 45 MIN: CPT | Performed by: INTERNAL MEDICINE

## 2019-10-30 RX ORDER — AMOXICILLIN 500 MG
CAPSULE ORAL
COMMUNITY

## 2019-10-30 RX ORDER — DIPHENOXYLATE HYDROCHLORIDE AND ATROPINE SULFATE 2.5; .025 MG/1; MG/1
1 TABLET ORAL DAILY
COMMUNITY

## 2019-10-30 NOTE — PROGRESS NOTES
New Patient Progress Note        Referring Provider  Do Julius [facility] [address]  [place], [address]     CC: Diabetes care    History of Present Illness:   Clarice Christianson is a 60 y o  male with a history of type 2 diabetes diagnosed recently in August 2019 during hospitalization for acute non ST elevation MI  His A1c at that time was 6 4 and post discharge a fasting blood glucose was 140  He was started on Amaryl on top of metformin that he was taking from before  for known pre diabetes  Patient reports a 20 lb weight loss since August 2019 associated with improved dietary habits and ongoing physical activity including cardiac rehabilitation  He reports to blurred vision for a longtime and wears glasses  He denies any numbness or tingling in his toes  Recent urinalysis did show some micro albuminuria and patient is on losartan  No history of strokes  Home blood glucose readings:   Before breakfast: 90-95  Before lunch:   Before dinner: 100-105  Bedtime:     Diet: 2 meals per day, 1 snacks per day  Timing of meals is predictable  Yes   diabetic diet compliance:  compliant most of the time    Activity: Daily activity is predictable Yes The patient maintains a regular, healthy exercise program   3 times a day with Cardio rehab    Current regimen: Amaryl 2mg daily and Metformin 1000mg BID  compliant all of the timedenies any side effects from medications  Hypoglycemia:   Reports 1 episode of hypoglycemia of 47 that was recorded after he started Amaryl  Amaryl dose was reduced to 1 mg daily since then  He also reports some general fogginess and loss of concentration since his MI  Treatment of hypoglycemia:   Apple juice    Medic alert tag: recommended: Yes    Diabetes education: No   Due to see nutritionist today      Diabetic ROS: no polyuria or polydipsia, no chest pain, dyspnea or TIAs, no numbness, tingling or pain in extremities, no unusual visual symptoms        Opthamology: Reports being referred by PCP for ophthalmology review  Podiatry: none,  Last foot exam was a couple of weeks ago by PCP  Has hypertension: followed by PCP; on ACE inhibitor/ARB, compliant all of the time  Has hyperlipidemia: followed by PCP; on statin - tolerating well, no myalgias  compliant all of the time  denies any side effects from medications  Thyroid disorders:   No known thyroid dysfunction with patient  Reports no history from family  History of pancreatitis:none      Reports some tremors of both hands for which she is on primidone by Neurology  Reports similar history with grandmother      Patient Active Problem List   Diagnosis    Erectile dysfunction    HTN (hypertension)    Mixed hyperlipidemia    Type 2 diabetes mellitus (HCC)    Tobacco abuse    Bronchitis    Acute non-recurrent frontal sinusitis    Acute non-recurrent maxillary sinusitis    Pharyngitis due to Streptococcus species    Tremor of both hands    NSTEMI (non-ST elevated myocardial infarction) (HCC)    Essential tremor    Microalbuminuria    Coronary artery disease involving native coronary artery of native heart without angina pectoris      Past Medical History:   Diagnosis Date    Asthma     Coronary artery disease     Diabetes ([facility] [address] )     HTN (hypertension)     Hypercholesteremia     Myocardial infarction [facility])       Past Surgical History:   Procedure Laterality Date    CARDIAC CATHETERIZATION      COLONOSCOPY      RESOLVED: 2003    CORONARY STENT PLACEMENT      MASS EXCISION Right     post auricular cyst removal - in office - Dr Ngozi Stovall - 6/10/19    TONSILLECTOMY        Family History   Problem Relation Age of Onset    Liver cancer Father     Skin cancer Father     Tremor Mother     Tremor Maternal Grandmother      Social History     Tobacco Use    Smoking status: Former Smoker     Packs/day: 1 00     Years: 40 00     Pack years: 40 00     Types: Cigarettes     Last attempt to quit: 8/24/2019 Years since quittin 1    Smokeless tobacco: Never Used    Tobacco comment: he has quit several times before   Substance Use Topics    Alcohol use: Never     Frequency: Never     Allergies   Allergen Reactions    Penicillins Throat Swelling    Shellfish Allergy Throat Swelling         Current Outpatient Medications:     albuterol (PROAIR HFA) 90 mcg/act inhaler, Inhale 2 puffs every 4 (four) hours as needed for shortness of breath, Disp: 1 each, Rfl: 0    aspirin (ECOTRIN LOW STRENGTH) 81 mg EC tablet, Take 1 tablet (81 mg total) by mouth daily, Disp: 30 tablet, Rfl: 0    ezetimibe (ZETIA) 10 mg tablet, Take 1 tablet (10 mg total) by mouth daily, Disp: 90 tablet, Rfl: 1    fluticasone (FLONASE) 50 mcg/act nasal spray, 2 sprays into each nostril daily (Patient taking differently: 2 sprays into each nostril daily as needed ), Disp: 1 Bottle, Rfl: 11    glimepiride (AMARYL) 2 mg tablet, Take 1 mg by mouth , Disp: , Rfl:     losartan (COZAAR) 25 mg tablet, Take 1 tablet (25 mg total) by mouth daily, Disp: 90 tablet, Rfl: 1    metFORMIN (GLUCOPHAGE) 1000 MG tablet, Take 1 tablet (1,000 mg total) by mouth 2 (two) times a day, Disp: 180 tablet, Rfl: 1    metoprolol tartrate (LOPRESSOR) 50 mg tablet, Take 1 tablet (50 mg total) by mouth every 12 (twelve) hours, Disp: 180 tablet, Rfl: 1    multivitamin (THERAGRAN) TABS, Take 1 tablet by mouth daily, Disp: , Rfl:     niacin (NIASPAN) 1000 MG CR tablet, Take 1 tablet (1,000 mg total) by mouth daily at bedtime, Disp: 90 tablet, Rfl: 1    nitroglycerin (NITROSTAT) 0 4 mg SL tablet, Place 1 tablet (0 4 mg total) under the tongue every 5 (five) minutes as needed for chest pain, Disp: 30 tablet, Rfl: 0    Omega-3 Fatty Acids (FISH OIL) 1200 MG CAPS, Take by mouth, Disp: , Rfl:     rosuvastatin (CRESTOR) 40 MG tablet, Take 1 tablet (40 mg total) by mouth daily, Disp: 90 tablet, Rfl: 1    ticagrelor (BRILINTA) 90 MG, Take 1 tablet (90 mg total) by mouth every 12 (twelve) hours, Disp: 180 tablet, Rfl: 1    primidone (MYSOLINE) 50 mg tablet, Take 2 tablets (100 mg total) by mouth every 12 (twelve) hours (Patient not taking: Reported on 10/30/2019), Disp: 120 tablet, Rfl: 3  Review of Systems   Constitutional: Positive for activity change and appetite change  HENT: Negative  Eyes: Negative  Respiratory: Negative  Cardiovascular: Negative  Gastrointestinal: Negative  Endocrine: Negative  Genitourinary: Negative  Musculoskeletal: Negative  Skin: Negative  Allergic/Immunologic: Negative  Neurological: Negative  Hematological: Negative  Psychiatric/Behavioral: Negative  Physical Exam:  Body mass index is 28 55 kg/m²  /80   Pulse 74   Ht 6' 1" (1 854 m)   Wt 98 2 kg (216 lb 6 4 oz)   BMI 28 55 kg/m²    Wt Readings from Last 3 Encounters:   10/30/19 98 2 kg (216 lb 6 4 oz)   10/17/19 98 9 kg (218 lb)   10/09/19 97 5 kg (215 lb)       Physical Exam   Constitutional: He is oriented to person, place, and time  He appears well-developed  HENT:   Head: Normocephalic  Mouth/Throat: Oropharynx is clear and moist    Eyes: Pupils are equal, round, and reactive to light  Neck: No thyromegaly present  Cardiovascular: Normal rate and normal heart sounds  Pulmonary/Chest: Effort normal and breath sounds normal    Abdominal: Soft  Bowel sounds are normal    Musculoskeletal:   Course tremor both hands  Neurological: He is oriented to person, place, and time  Skin: Capillary refill takes less than 2 seconds     Normal exam         Labs:   Lab Results   Component Value Date    HGBA1C 6 4 (H) 08/24/2019       No components found for: GLU    Lab Results   Component Value Date    CREATININE 0 85 08/27/2019    CREATININE 0 86 08/26/2019    CREATININE 0 88 08/25/2019    BUN 15 08/27/2019    K 4 0 08/27/2019     08/27/2019    CO2 26 08/27/2019     eGFR   Date Value Ref Range Status   08/27/2019 95 ml/min/1 73sq m Final     No components found for: MALBCRER    No results found for: CHOL, HDL, TRIG, CHOLHDL    Lab Results   Component Value Date    ALT 26 08/24/2019    AST 48 (H) 08/24/2019    ALKPHOS 65 08/24/2019       No results found for: TSH, FREET4, TSI    Impression:  1  Type 2 diabetes mellitus with hyperosmolarity without coma, without long-term current use of insulin ([address] )    2  Essential hypertension    3  Screening for thyroid disorder    4  Encounter for vitamin deficiency screening           Plan:    Diagnoses and all orders for this visit:      Newly diagnosed Type 2 diabetes mellitus with hyperosmolarity without coma, without long-term current use of insulin ([address] )  Patient is presently motivated and has made dietary and lifestyle changes, quit smoking and is now doing regular physical activity  In this context it is expected that his A1c will improve into the pre diabetes range  Given history of hypoglycemia, will stop Amaryl now and patient will send us a 14 day blood glucose logs based on which further medications could be considered  GLP 1 inhibitors versus SGLT 2 inhibitors would be a better choice given his heart disease  He was also advised to see an ophthalmologist for retinopathy screening  Will follow in 3 months in office   -     Hemoglobin A1C; Future    Essential hypertension  He is presently on losartan and metoprolol with good blood pressures reported  Will follow with PCP  Screening for thyroid disorder  -     TSH, 3rd generation; Future  -     T4, free; Future    Encounter for vitamin deficiency screening  -     Vitamin D 25 hydroxy; Future    Other orders  -     Mychart glucose flowsheet      Discussed with the patient and all questioned fully answered  He will call me if any problems arise      Counseled patient on diagnostic results, prognosis, risk and benefit of treatment options, instruction for management, importance of treatment compliance, Risk  factor reduction and impressions      Azalia Olmedo

## 2019-10-31 ENCOUNTER — CLINICAL SUPPORT (OUTPATIENT)
Dept: NUTRITION | Facility: HOSPITAL | Age: 59
End: 2019-10-31
Payer: COMMERCIAL

## 2019-10-31 VITALS — BODY MASS INDEX: 27.68 KG/M2 | WEIGHT: 209.8 LBS

## 2019-10-31 DIAGNOSIS — Z95.5 S/P CORONARY ARTERY STENT PLACEMENT: ICD-10-CM

## 2019-10-31 PROCEDURE — 97802 MEDICAL NUTRITION INDIV IN: CPT

## 2019-10-31 NOTE — PROGRESS NOTES
Initial Nutrition Assessment Form    Patient Name: Fariha Gao    YOB: 1960    Sex:  Male     Assessment Date: 10/31/2019  Start Time: 205 Stop Time: 305 Total Minutes: 61     Data:  Present at session: self   Parent/Patient Concerns: Heart healthy diet   Medical Dx/Reason for Referral: Cardiac stent   Past Medical History:   Diagnosis Date    Asthma     Coronary artery disease     Diabetes ([facility] [address] )     HTN (hypertension)     Hypercholesteremia     Myocardial infarction ([facility] [address] )        Current Outpatient Medications   Medication Sig Dispense Refill    albuterol (PROAIR HFA) 90 mcg/act inhaler Inhale 2 puffs every 4 (four) hours as needed for shortness of breath 1 each 0    aspirin (ECOTRIN LOW STRENGTH) 81 mg EC tablet Take 1 tablet (81 mg total) by mouth daily 30 tablet 0    ezetimibe (ZETIA) 10 mg tablet Take 1 tablet (10 mg total) by mouth daily 90 tablet 1    fluticasone (FLONASE) 50 mcg/act nasal spray 2 sprays into each nostril daily (Patient taking differently: 2 sprays into each nostril daily as needed ) 1 Bottle 11    losartan (COZAAR) 25 mg tablet Take 1 tablet (25 mg total) by mouth daily 90 tablet 1    metFORMIN (GLUCOPHAGE) 1000 MG tablet Take 1 tablet (1,000 mg total) by mouth 2 (two) times a day 180 tablet 1    metoprolol tartrate (LOPRESSOR) 50 mg tablet Take 1 tablet (50 mg total) by mouth every 12 (twelve) hours 180 tablet 1    multivitamin (THERAGRAN) TABS Take 1 tablet by mouth daily      niacin (NIASPAN) 1000 MG CR tablet Take 1 tablet (1,000 mg total) by mouth daily at bedtime 90 tablet 1    nitroglycerin (NITROSTAT) 0 4 mg SL tablet Place 1 tablet (0 4 mg total) under the tongue every 5 (five) minutes as needed for chest pain 30 tablet 0    Omega-3 Fatty Acids (FISH OIL) 1200 MG CAPS Take by mouth      primidone (MYSOLINE) 50 mg tablet Take 2 tablets (100 mg total) by mouth every 12 (twelve) hours (Patient not taking: Reported on 10/30/2019) 120 tablet 3    rosuvastatin (CRESTOR) 40 MG tablet Take 1 tablet (40 mg total) by mouth daily 90 tablet 1    ticagrelor (BRILINTA) 90 MG Take 1 tablet (90 mg total) by mouth every 12 (twelve) hours 180 tablet 1     No current facility-administered medications for this visit  Additional Meds/Supplements: Vit E   Special Learning Needs: n/a   Height: HC Readings from Last 3 Encounters:   No data found for [facility]       Weight: Wt Readings from Last 10 Encounters:   10/31/19 95 2 kg (209 lb 12 8 oz)   10/30/19 98 2 kg (216 lb 6 4 oz)   10/17/19 98 9 kg (218 lb)   10/09/19 97 5 kg (215 lb)   10/03/19 97 kg (213 lb 12 8 oz)   19 98 4 kg (217 lb)   19 99 2 kg (218 lb 12 8 oz)   19 98 9 kg (218 lb)   19 105 kg (232 lb 5 8 oz)   19 103 kg (226 lb)     Estimated body mass index is 27 68 kg/m² as calculated from the following:    Height as of 10/30/19: 6' 1" (1 854 m)  Weight as of this encounter: 95 2 kg (209 lb 12 8 oz)  Recent Weight Change: [x]Yes     []No  Amount: Gradually losing wt intentionally      Energy Needs: 2375cals (25cals/kg BW)   Allergies   Allergen Reactions    Penicillins Throat Swelling    Shellfish Allergy Throat Swelling       Social History     Substance and Sexual Activity   Alcohol Use Never    Frequency: Never    n/a   Social History     Tobacco Use   Smoking Status Former Smoker    Packs/day: 1 00    Years: 40 00    Pack years: 40 00    Types: Cigarettes    Last attempt to quit: 2019    Years since quittin 1   Smokeless Tobacco Never Used   Tobacco Comment    he has quit several times before    n/a   Who shops? patient   Who cooks? patient   Exercise: cardio rehab- biking 17mins treadmill 17mins 10 mins biking 3x/wk   Prior Counseling? []Yes     [x]No  When:      Why:         Diet Hx: water  Breakfast: ezekial bread 1 slice cream cheese lettuce and tomato; coffee- 8 oz caffeine international delight 2oz   7-730   a m     Lunch:      p m  skips         Dinner: turkey/tuna/ catfish and sweet potaotes  5-6   p m  Snacks: AM - applesauce- NSA 1-2x/wk  PM -   HS - fruit        Nutrition Diagnosis:   Inappropriate intake of fats  related to Food and nutrition-related knowledge deficit concerning appropriate amount of dietary fat as  evidenced by Cholesterol >200 mg/dL , LDL cholesterol >100 mg/dL, HDL cholesterol <40 mg/dL, triglycerides >150 mg/dL       Medical Nutrition Therapy Intervention:  [x]Individualized Meal Plan []Understanding Lab Values   []Basic Pathophysiology of Disease []Food/Medication Interactions   [x]Food Diary [x]Exercise   [x]Lifestyle/Behavior Modification Techniques []Medication, Mechanism of Action   []Label Reading []Self Blood Glucose Monitoring; checking before B and D and some variability added in as of yesterday, DM meds still on metformin, other DM med d/c'ed yesterday   [x]Weight/BMI Goals- wants to weigh 195# [x]Other - asleep by 10; up several times during the night now   Other Notes: Awake by 7; naps during the day minimal       Comprehension: []Excellent  [x]Very Good  []Good  []Fair   []Poor    Receptivity: []Excellent  [x]Very Good  []Good  []Fair   []Poor    Expected Compliance: []Excellent  [x]Very Good  []Good  []Fair   []Poor        Goals:  1  3 meals a day no skipping   2  Portions per plate method    3  activity as able and cleared by cardiac       No follow-ups on file    Labs:  CMP  Lab Results   Component Value Date    K 4 0 08/27/2019     08/27/2019    CO2 26 08/27/2019    BUN 15 08/27/2019    CREATININE 0 85 08/27/2019    CALCIUM 9 1 08/27/2019    AST 48 (H) 08/24/2019    ALT 26 08/24/2019    ALKPHOS 65 08/24/2019    EGFR 95 08/27/2019       BMP  Lab Results   Component Value Date    CALCIUM 9 1 08/27/2019    K 4 0 08/27/2019    CO2 26 08/27/2019     08/27/2019    BUN 15 08/27/2019    CREATININE 0 85 08/27/2019       Lipids  No results found for: CHOL  No results found for: HDL  No results found for: [address]  No results found for: TRIG  No results found for: CHOLHDL    Hemoglobin A1C  Lab Results   Component Value Date    HGBA1C 6 4 (H) 08/24/2019       Fasting Glucose  No results found for: GLUF    Insulin     Thyroid  No results found for: TSH, C4KZPBY, Q8SJDQH, THYROIDAB    Hepatic Function Panel  Lab Results   Component Value Date    ALT 26 08/24/2019    AST 48 (H) 08/24/2019    ALKPHOS 65 08/24/2019       Celiac Disease Antibody Panel  No results found for: ENDOMYSIAL IGA, GLIADIN IGA, GLIADIN IGG, IGA, TISSUE TRANSGLUT AB, TTG IGA   Iron  No results found for: IRON, TIBC, FERRITIN    Vitamins  No results found for: VITAMIN B2   No results found for: NICOTINAMIDE, NICOTINIC ACID   No results found for: VITAMINB6  No results found for: [id number]  No results found for: VITB5  No results found for: B1TFUMTZ  No results found for: THYROGLB  No results found for: VITAMIN K   No results found for: 25-HYDROXY VIT D   No components found for: VITAMINE     DSalmon MS RD LDN  [facility]  [address] [place] [id number]

## 2019-11-01 ENCOUNTER — CLINICAL SUPPORT (OUTPATIENT)
Dept: CARDIAC REHAB | Age: 59
End: 2019-11-01
Payer: COMMERCIAL

## 2019-11-01 DIAGNOSIS — Z95.5 STENTED CORONARY ARTERY: ICD-10-CM

## 2019-11-01 DIAGNOSIS — I21.4 NSTEMI (NON-ST ELEVATED MYOCARDIAL INFARCTION) (HCC): ICD-10-CM

## 2019-11-01 PROCEDURE — 93798 PHYS/QHP OP CAR RHAB W/ECG: CPT

## 2019-11-01 NOTE — PROGRESS NOTES
Cardiac Rehabilitation Plan of Care   30 day       Today's date: 2019   Visits: 14  Patient name: Felicitas Reyna      : [birth year]  Age: 60 y o  MRN: [id number]  Referring Physician: Lali Galeana PA-C  Cardiologist: Monica Hoang MD  Provider: [facility]  Clinician: Raheel Ferrera MS, CEP    Dx:   Encounter Diagnoses   Name Primary?  NSTEMI (non-ST elevated myocardial infarction) ([facility] [address] )     Stented coronary artery      Date of onset: 19      SUMMARY OF PROGRESS:  Frida Arndt is doing well in cardiac rehab so far  He has completed 14 exercise sessions and continues to abstain completely from smoking  He completes 35-40 min of exercise at 1 8-3 5 METs plus weight training without cardiac complaint, NSR, and normal hemodynamic response to exercise  His exercise HR ranges from  bpm and his highest exercise BP was 142/70  Frida Arndt has not started any home exercise due to time constraints with work so I suggested going for walks a few times/week with his dogs to keep him active  He looks forward to coming to cardiac rehab and plans to join a gym as soon as he is finished to keep up with his exercise  Frida Arndt has lost 4 lbs since starting cardiac with a final goal of 195 lb to help with his dropping Hemoglobin A1c  He reports that his FBG has been anywhere between  and he has made many changes to his diet, not eating anything white and cutting out red meat completely  Regarding his mental health, a repeat PHQ-9 was not completed today but Frida Arndt denies depression/anxiety and states that his stress level is a 6/10  He states that anything that will stress him out more than that he doesn't worry about         Medication compliance: Yes   Comments:   Fall Risk: Low   Comments:     EKG changes: none      EXERCISE ASSESSMENT and PLAN    Current Exercise Program in Rehab:       Frequency: 3 days/week        Minutes: 35-40        METS: 1 8-3 5            HR:    RPE: 3-4         Modalities: Treadmill, Airdyne bike, UBE and Lifecycle      Exercise Progression 30 Day Goals :    Frequency: 3 days/week  With home exercise   Minutes: 40   METS: 2 5-4   HR:     RPE: 4-6   Modalities: Treadmill, Airdyne bike, UBE, Lifecycle and Rower    Strength trainin-3 days / week  12-15 repetitions  1-2 sets per modality    Modalities: Leg Press, Chest Press, Pull Downs, Lateral Raise, Arm Extension and Arm Curl    Progressing:  Yes - added weight training to exercise    Home Exercise: None    Goals: 10% improvement in functional capacity, Reduced Dyspnea with physical activity  0-1/10, Increase in peak CR METs by 40%, Resume ADLs with increased strength and Exercise 5 days/wk, >150mins/wk  Education: Benefit of exercise for CAD risk factors, signs and sxs, RPE scale and class: Risk Factors for Heart Disease   Plan:education on home exercise guidelines and home exercise 30+ mins 2 days opposite CR  Readiness to change: Action      NUTRITION ASSESSMENT AND PLAN    Weight control:    Starting weight: 219   Current weight:  208   Waist circumference:    Startin 5   Current:    Diabetes: Patient reported fasting BS   Lipid management: Discussed diet and lipid management and last lipid profile 2016  Goals:reduced BMI to < 25, decreased body fat% <25%   , fasting BG , improved A1c  < 6 5%, Improved Rate Your Plate score  >54 and 2 5-5%  wt loss  Education: heart healthy eating  low sodium diet  diet and lipid management  diabetes management and exercise  wt   Loss  Education class: Reading Food Labels  Education Class: Heart Healthy Eating  Progressing: Yes - patient reports cutting out red meat and reducing refined grains  Plan: Increase PUFA and MUFA, Decrease SFA, Increase whole grains, increase fruits/vegs, eliminate processed meats, reduce red meat 1x/wk, swtich to low fat dairy and Reduce added sugars <25g/day  Readiness to change: Action      PSYCHOSOCIAL ASSESSMENT AND PLAN    Emotional: PHQ-9 = 12 = Moderate Depression  Self-reported stress level: 6/10   - work related  Social support: Excellent  Goals:  Reduce perceived stress to 1-3/10, PHQ-9 - reduced severity by one level, behavioral health consult, Feelings in [facility] Score < 3, Physical Fitness in [facility] Score < 3, Daily Activity in [facility] Score < 3, Overall Health in [facility] Score < 3, Increased interest in doing things, improved concentration and increased energy  Education: benefits of positive support system and coping mechanisms  Progressing: Yes - reduced stress level and reports that he doesn't worry about things if they are going to increase his stress over -7/10  Plan: Class: Stress and Your Health and Class: Relaxation  Readiness to change: Preparation      OTHER CORE COMPONENTS     Tobacco:   Social History     Tobacco Use   Smoking Status Former Smoker    Packs/day: 1 00    Years: 40 00    Pack years: 40 00    Types: Cigarettes    Last attempt to quit: 2019    Years since quittin 1   Smokeless Tobacco Never Used   Tobacco Comment    he has quit several times before       Tobacco Use Intervention: Referral to tobacco expert:   Smoked 1 5 ppd x 35 years                    Has abstained since MI        Brief counseling by cardiac rehab professional  Date: 19    Blood pressure:    Restin/58 - 110/70   Exercise: 110/58 - 142/70    Goals: reduced dietary sodium <2300mg and consistent exercise >150 mins/wk  Education:  understanding HTN and CAD, low sodium diet and HTN, Education class:  Common Heart Medications, Education class: Understanding Heart Disease, smoking and heart disease and tobacco triggers  Progressing: Yes - attended both education classes offered  Plan: Complete abstention from smoking  Readiness to change: Action

## 2019-11-04 ENCOUNTER — CLINICAL SUPPORT (OUTPATIENT)
Dept: CARDIAC REHAB | Age: 59
End: 2019-11-04
Payer: COMMERCIAL

## 2019-11-04 DIAGNOSIS — I21.4 NSTEMI (NON-ST ELEVATED MYOCARDIAL INFARCTION) (HCC): ICD-10-CM

## 2019-11-04 DIAGNOSIS — Z95.5 STENTED CORONARY ARTERY: ICD-10-CM

## 2019-11-04 PROCEDURE — 93798 PHYS/QHP OP CAR RHAB W/ECG: CPT

## 2019-11-06 ENCOUNTER — CLINICAL SUPPORT (OUTPATIENT)
Dept: CARDIAC REHAB | Age: 59
End: 2019-11-06
Payer: COMMERCIAL

## 2019-11-06 DIAGNOSIS — Z95.5 STENTED CORONARY ARTERY: ICD-10-CM

## 2019-11-06 DIAGNOSIS — I21.4 NSTEMI (NON-ST ELEVATED MYOCARDIAL INFARCTION) (HCC): ICD-10-CM

## 2019-11-06 PROCEDURE — 93798 PHYS/QHP OP CAR RHAB W/ECG: CPT

## 2019-11-08 ENCOUNTER — CLINICAL SUPPORT (OUTPATIENT)
Dept: CARDIAC REHAB | Age: 59
End: 2019-11-08
Payer: COMMERCIAL

## 2019-11-08 DIAGNOSIS — I21.4 NSTEMI (NON-ST ELEVATED MYOCARDIAL INFARCTION) (HCC): ICD-10-CM

## 2019-11-08 DIAGNOSIS — Z95.5 STENTED CORONARY ARTERY: ICD-10-CM

## 2019-11-08 PROCEDURE — 93798 PHYS/QHP OP CAR RHAB W/ECG: CPT

## 2019-11-11 ENCOUNTER — CLINICAL SUPPORT (OUTPATIENT)
Dept: CARDIAC REHAB | Age: 59
End: 2019-11-11
Payer: COMMERCIAL

## 2019-11-11 DIAGNOSIS — Z95.5 STENTED CORONARY ARTERY: ICD-10-CM

## 2019-11-11 DIAGNOSIS — I21.4 NSTEMI (NON-ST ELEVATED MYOCARDIAL INFARCTION) (HCC): ICD-10-CM

## 2019-11-11 PROCEDURE — 93798 PHYS/QHP OP CAR RHAB W/ECG: CPT

## 2019-11-13 ENCOUNTER — CLINICAL SUPPORT (OUTPATIENT)
Dept: CARDIAC REHAB | Age: 59
End: 2019-11-13
Payer: COMMERCIAL

## 2019-11-13 DIAGNOSIS — I21.4 NSTEMI (NON-ST ELEVATED MYOCARDIAL INFARCTION) (HCC): ICD-10-CM

## 2019-11-13 DIAGNOSIS — Z95.5 STENTED CORONARY ARTERY: ICD-10-CM

## 2019-11-13 PROCEDURE — 93798 PHYS/QHP OP CAR RHAB W/ECG: CPT

## 2019-11-15 ENCOUNTER — CLINICAL SUPPORT (OUTPATIENT)
Dept: CARDIAC REHAB | Age: 59
End: 2019-11-15
Payer: COMMERCIAL

## 2019-11-15 DIAGNOSIS — I21.4 NSTEMI (NON-ST ELEVATED MYOCARDIAL INFARCTION) (HCC): ICD-10-CM

## 2019-11-15 DIAGNOSIS — Z95.5 STENTED CORONARY ARTERY: ICD-10-CM

## 2019-11-15 PROCEDURE — 93798 PHYS/QHP OP CAR RHAB W/ECG: CPT

## 2019-11-20 ENCOUNTER — CLINICAL SUPPORT (OUTPATIENT)
Dept: CARDIAC REHAB | Age: 59
End: 2019-11-20
Payer: COMMERCIAL

## 2019-11-20 DIAGNOSIS — I21.4 NSTEMI (NON-ST ELEVATED MYOCARDIAL INFARCTION) (HCC): ICD-10-CM

## 2019-11-20 DIAGNOSIS — Z95.5 STENTED CORONARY ARTERY: ICD-10-CM

## 2019-11-20 PROCEDURE — 93798 PHYS/QHP OP CAR RHAB W/ECG: CPT

## 2019-11-21 ENCOUNTER — OFFICE VISIT (OUTPATIENT)
Dept: FAMILY MEDICINE CLINIC | Facility: CLINIC | Age: 59
End: 2019-11-21
Payer: COMMERCIAL

## 2019-11-21 VITALS
WEIGHT: 206 LBS | BODY MASS INDEX: 27.3 KG/M2 | DIASTOLIC BLOOD PRESSURE: 68 MMHG | HEIGHT: 73 IN | SYSTOLIC BLOOD PRESSURE: 118 MMHG | TEMPERATURE: 97.9 F

## 2019-11-21 DIAGNOSIS — J01.10 ACUTE NON-RECURRENT FRONTAL SINUSITIS: Primary | ICD-10-CM

## 2019-11-21 PROCEDURE — 99213 OFFICE O/P EST LOW 20 MIN: CPT | Performed by: FAMILY MEDICINE

## 2019-11-21 PROCEDURE — 1036F TOBACCO NON-USER: CPT | Performed by: FAMILY MEDICINE

## 2019-11-21 RX ORDER — CEFUROXIME AXETIL 500 MG/1
500 TABLET ORAL EVERY 12 HOURS SCHEDULED
Qty: 14 TABLET | Refills: 0 | Status: SHIPPED | OUTPATIENT
Start: 2019-11-21 | End: 2019-11-28

## 2019-11-21 NOTE — PROGRESS NOTES
Assessment/Plan:             Diagnoses and all orders for this visit:    Acute non-recurrent frontal sinusitis  -     cefuroxime (CEFTIN) 500 mg tablet; Take 1 tablet (500 mg total) by mouth every 12 (twelve) hours for 7 days            Subjective:        Patient ID: Isabela Muniz is a 60 y o  male  Patient presents with:  Cold Like Symptoms: Heavy chest and ear congestion past few day's not improving  Medication Problem: [place] [facility] on file for today's visit  The following portions of the patient's history were reviewed and updated as appropriate: allergies, current medications, past family history, past medical history, past social history, past surgical history and problem list       Review of Systems   Constitutional: Negative  HENT: Positive for congestion, rhinorrhea, sinus pressure and sore throat  Eyes: Negative  Respiratory: Negative  Negative for cough and shortness of breath  Cardiovascular: Negative  Gastrointestinal: Negative  Endocrine: Negative  Genitourinary: Negative  Musculoskeletal: Negative  Skin: Negative  Allergic/Immunologic: Negative  Neurological: Negative  Hematological: Negative  Psychiatric/Behavioral: Negative  All other systems reviewed and are negative  Objective:             /68 (BP Location: Left arm)   Temp 97 9 °F (36 6 °C)   Ht 6' 1" (1 854 m)   Wt 93 4 kg (206 lb)   BMI 27 18 kg/m²          Physical Exam   Constitutional: He is oriented to person, place, and time  He appears well-developed and well-nourished  HENT:   Head: Normocephalic and atraumatic  Right Ear: External ear normal    Left Ear: External ear normal    Nose: Nose normal    Mouth/Throat: Oropharyngeal exudate present  Eyes: Pupils are equal, round, and reactive to light  Conjunctivae and EOM are normal    Neck: Normal range of motion  Neck supple  Cardiovascular: Normal rate, regular rhythm and normal heart sounds  Pulmonary/Chest: Effort normal and breath sounds normal  He has no wheezes  He has no rales  Abdominal: Soft  Bowel sounds are normal    Musculoskeletal: Normal range of motion  Neurological: He is alert and oriented to person, place, and time  He has normal reflexes  Skin: Skin is warm and dry  Psychiatric: He has a normal mood and affect  His behavior is normal    Nursing note and vitals reviewed

## 2019-11-27 ENCOUNTER — CLINICAL SUPPORT (OUTPATIENT)
Dept: CARDIAC REHAB | Age: 59
End: 2019-11-27
Payer: COMMERCIAL

## 2019-11-27 DIAGNOSIS — Z95.5 STENTED CORONARY ARTERY: ICD-10-CM

## 2019-11-27 DIAGNOSIS — I21.4 NSTEMI (NON-ST ELEVATED MYOCARDIAL INFARCTION) (HCC): ICD-10-CM

## 2019-11-27 PROCEDURE — 93798 PHYS/QHP OP CAR RHAB W/ECG: CPT

## 2019-11-27 NOTE — PROGRESS NOTES
Cardiac Rehabilitation Plan of Care   60 day      Today's date: 2019   Visits: 22  Patient name: Fariha Gao      : [id number]  Age: 60 y o  MRN: [id number]  Referring Physician: Jemima Saez PA-C  Cardiologist: Cee Pepper MD  Provider: Arnulfo Bauer  Clinician: Shawn Mallory RN    Dx:   Encounter Diagnoses   Name Primary?  NSTEMI (non-ST elevated myocardial infarction) ([facility] [address] )     Stented coronary artery      Date of onset: 19      SUMMARY OF PROGRESS:   Cheryl Cheney has been completing 40 mins of exercise at 3 0-5 2 METs plus strength training and is tolerating well  NSR without ectopy on telemetry  Denies cardiac complaints  Resting BP's ranging 90//70, occasionally low BP in recovery  Exercise BP's with normal hemodynamic response typically run 118//56  No home exercise  Encouraged walking 20-30 mins twice a week opposite CR days which he agreed with this plan  Reviewed AHA recommendation of 150 mins/week of cardio exercise  Cheryl Cheney reports that he rarely eats red meat, once every 2 months  Follows low sodium diet, and rarely has sweets/baked goods  His weight has decreased from 219 to 205 lbs since starting CR  His goal is to be at 195 lbs  He checks his FBS daily which he reports run   His stress is usually minimal though work issues can raise it to 9/10  Reviewed stress techniques and plan is to attend our stress class  Denies depression  Repeat PHQ-9 today decreased from 12 (moderate depression) to 3 (minimal depression)          Medication compliance: Yes   Comments:   Fall Risk: Low   Comments:     EKG changes: none      EXERCISE ASSESSMENT and PLAN    Current Exercise Program in Rehab:       Frequency: 3 days/week        Minutes: 40        METS: 3 0-5 2            HR:    RPE: 4-6         Modalities: Treadmill, Airdyne bike, UBE, Lifecycle and Rower      Exercise Progression 30 Day Goals :    Frequency: 3 days/week  With home exercise   Minutes: 40   METS: 3 3-5 5   HR:     RPE: 4-6   Modalities: Treadmill, Airdyne bike, UBE, Lifecycle, Elliptical and Rower    Strength trainin-3 days / week  12-15 repetitions  1-2 sets per modality    Modalities: Leg Press, Chest Press, Pull Downs, Lateral Raise, Arm Extension and Arm Curl    Progressing:  Yes - increased intensity levels and duration    Home Exercise: None    Goals: 10% improvement in functional capacity, Reduced Dyspnea with physical activity  0-1/10, Increase in peak CR METs by 40%, Resume ADLs with increased strength and Exercise 5 days/wk, >150mins/wk  Education: Benefit of exercise for CAD risk factors, signs and sxs, RPE scale and class: Risk Factors for Heart Disease   Plan:education on home exercise guidelines and home exercise 30+ mins 2 days opposite CR  Readiness to change: Action      NUTRITION ASSESSMENT AND PLAN    Weight control:    Starting weight: 219   Current weight:  205   Waist circumference:    Startin 5   Current:    Diabetes: Patient reported fasting BS   Lipid management: Discussed diet and lipid management and last lipid profile 2016  Goals:reduced BMI to < 25, decreased body fat% <25%   , fasting BG , improved A1c  < 6 5%, Improved Rate Your Plate score  >26 and 2 5-5%  wt loss  Education: heart healthy eating  low sodium diet  diet and lipid management  diabetes management and exercise  wt   Loss  Education class: Reading Food Labels  Education Class: Heart Healthy Eating  Progressing: Yes - patient reports cutting out red meat and reducing refined grains, no added salt  Plan: Increase PUFA and MUFA, Decrease SFA, Increase whole grains, increase fruits/vegs, eliminate processed meats, reduce red meat 1x/wk, swtich to low fat dairy and Reduce added sugars <25g/day  Readiness to change: Action      PSYCHOSOCIAL ASSESSMENT AND PLAN    Emotional:  PHQ-9 = 3 = Minimal Depression  Self-reported stress level: 2/10   - work related  Social support: Excellent  Goals:  Reduce perceived stress to 1-3/10, PHQ-9 - reduced severity by one level, behavioral health consult, Feelings in [facility] Score < 3, Physical Fitness in [facility] Score < 3, Daily Activity in [facility] Score < 3, Overall Health in [facility] Score < 3, Increased interest in doing things, improved concentration and increased energy  Education: benefits of positive support system and coping mechanisms  Progressing: Yes - reduced stress level and reports that he doesn't worry about things if they are going to increase his stress    Plan: Class: Stress and Your Health and Class: Relaxation  Readiness to change: Preparation      OTHER CORE COMPONENTS     Tobacco:   Social History     Tobacco Use   Smoking Status Former Smoker    Packs/day: 1 00    Years: 40 00    Pack years: 40 00    Types: Cigarettes    Last attempt to quit: 2019    Years since quittin 2   Smokeless Tobacco Never Used   Tobacco Comment    he has quit several times before       Tobacco Use Intervention: Referral to tobacco expert:   Smoked 1 5 ppd x 35 years                    Has abstained since MI        Brief counseling by cardiac rehab professional  Date: 19    Blood pressure:    Restin//70   Exercise: 118//56    Goals: reduced dietary sodium <2300mg and consistent exercise >150 mins/wk  Education:  understanding HTN and CAD, low sodium diet and HTN, Education class:  Common Heart Medications, Education class: Understanding Heart Disease, smoking and heart disease and tobacco triggers  Progressing: Yes - attended both education classes offered, has abstained from smoking  Plan: Complete abstention from smoking  Readiness to change: Action

## 2019-11-29 ENCOUNTER — CLINICAL SUPPORT (OUTPATIENT)
Dept: CARDIAC REHAB | Age: 59
End: 2019-11-29
Payer: COMMERCIAL

## 2019-11-29 DIAGNOSIS — I21.4 NSTEMI (NON-ST ELEVATED MYOCARDIAL INFARCTION) (HCC): ICD-10-CM

## 2019-11-29 PROCEDURE — 93798 PHYS/QHP OP CAR RHAB W/ECG: CPT

## 2019-12-02 ENCOUNTER — APPOINTMENT (OUTPATIENT)
Dept: CARDIAC REHAB | Age: 59
End: 2019-12-02
Payer: COMMERCIAL

## 2019-12-04 ENCOUNTER — CLINICAL SUPPORT (OUTPATIENT)
Dept: CARDIAC REHAB | Age: 59
End: 2019-12-04
Payer: COMMERCIAL

## 2019-12-04 DIAGNOSIS — I21.4 NSTEMI (NON-ST ELEVATED MYOCARDIAL INFARCTION) (HCC): ICD-10-CM

## 2019-12-04 PROCEDURE — 93798 PHYS/QHP OP CAR RHAB W/ECG: CPT

## 2019-12-06 ENCOUNTER — CLINICAL SUPPORT (OUTPATIENT)
Dept: CARDIAC REHAB | Age: 59
End: 2019-12-06
Payer: COMMERCIAL

## 2019-12-06 DIAGNOSIS — I21.4 NSTEMI (NON-ST ELEVATED MYOCARDIAL INFARCTION) (HCC): ICD-10-CM

## 2019-12-06 PROCEDURE — 93798 PHYS/QHP OP CAR RHAB W/ECG: CPT

## 2019-12-09 ENCOUNTER — APPOINTMENT (OUTPATIENT)
Dept: CARDIAC REHAB | Age: 59
End: 2019-12-09
Payer: COMMERCIAL

## 2019-12-11 ENCOUNTER — CLINICAL SUPPORT (OUTPATIENT)
Dept: CARDIAC REHAB | Age: 59
End: 2019-12-11
Payer: COMMERCIAL

## 2019-12-11 DIAGNOSIS — Z95.5 STENTED CORONARY ARTERY: ICD-10-CM

## 2019-12-11 DIAGNOSIS — I21.4 NSTEMI (NON-ST ELEVATED MYOCARDIAL INFARCTION) (HCC): ICD-10-CM

## 2019-12-11 PROCEDURE — 93798 PHYS/QHP OP CAR RHAB W/ECG: CPT

## 2019-12-13 ENCOUNTER — CLINICAL SUPPORT (OUTPATIENT)
Dept: CARDIAC REHAB | Age: 59
End: 2019-12-13
Payer: COMMERCIAL

## 2019-12-13 DIAGNOSIS — I21.4 NSTEMI (NON-ST ELEVATED MYOCARDIAL INFARCTION) (HCC): ICD-10-CM

## 2019-12-13 DIAGNOSIS — Z95.5 STENTED CORONARY ARTERY: ICD-10-CM

## 2019-12-13 PROCEDURE — 93798 PHYS/QHP OP CAR RHAB W/ECG: CPT

## 2019-12-16 ENCOUNTER — CLINICAL SUPPORT (OUTPATIENT)
Dept: CARDIAC REHAB | Age: 59
End: 2019-12-16
Payer: COMMERCIAL

## 2019-12-16 DIAGNOSIS — I21.4 NSTEMI (NON-ST ELEVATED MYOCARDIAL INFARCTION) (HCC): ICD-10-CM

## 2019-12-16 DIAGNOSIS — Z95.5 STENTED CORONARY ARTERY: ICD-10-CM

## 2019-12-16 PROCEDURE — 93798 PHYS/QHP OP CAR RHAB W/ECG: CPT

## 2019-12-18 ENCOUNTER — CLINICAL SUPPORT (OUTPATIENT)
Dept: CARDIAC REHAB | Age: 59
End: 2019-12-18
Payer: COMMERCIAL

## 2019-12-18 DIAGNOSIS — Z95.5 STENTED CORONARY ARTERY: ICD-10-CM

## 2019-12-18 DIAGNOSIS — I21.4 NSTEMI (NON-ST ELEVATED MYOCARDIAL INFARCTION) (HCC): ICD-10-CM

## 2019-12-18 PROCEDURE — 93798 PHYS/QHP OP CAR RHAB W/ECG: CPT

## 2019-12-20 ENCOUNTER — CLINICAL SUPPORT (OUTPATIENT)
Dept: CARDIAC REHAB | Age: 59
End: 2019-12-20
Payer: COMMERCIAL

## 2019-12-20 DIAGNOSIS — Z95.5 STENTED CORONARY ARTERY: ICD-10-CM

## 2019-12-20 DIAGNOSIS — I21.4 NSTEMI (NON-ST ELEVATED MYOCARDIAL INFARCTION) (HCC): ICD-10-CM

## 2019-12-20 PROCEDURE — 93798 PHYS/QHP OP CAR RHAB W/ECG: CPT

## 2019-12-24 ENCOUNTER — APPOINTMENT (OUTPATIENT)
Dept: CARDIAC REHAB | Age: 59
End: 2019-12-24
Payer: COMMERCIAL

## 2019-12-27 ENCOUNTER — APPOINTMENT (OUTPATIENT)
Dept: URGENT CARE | Age: 59
End: 2019-12-27
Payer: COMMERCIAL

## 2019-12-27 ENCOUNTER — APPOINTMENT (OUTPATIENT)
Dept: LAB | Age: 59
End: 2019-12-27
Payer: COMMERCIAL

## 2019-12-27 DIAGNOSIS — Z13.29 SCREENING FOR THYROID DISORDER: ICD-10-CM

## 2019-12-27 DIAGNOSIS — I21.4 NSTEMI (NON-ST ELEVATED MYOCARDIAL INFARCTION) (HCC): ICD-10-CM

## 2019-12-27 DIAGNOSIS — E11.00 TYPE 2 DIABETES MELLITUS WITH HYPEROSMOLARITY WITHOUT COMA, WITHOUT LONG-TERM CURRENT USE OF INSULIN (HCC): ICD-10-CM

## 2019-12-27 DIAGNOSIS — I10 ESSENTIAL HYPERTENSION: ICD-10-CM

## 2019-12-27 DIAGNOSIS — E78.2 MIXED HYPERLIPIDEMIA: ICD-10-CM

## 2019-12-27 DIAGNOSIS — Z13.21 ENCOUNTER FOR VITAMIN DEFICIENCY SCREENING: ICD-10-CM

## 2019-12-27 LAB
25(OH)D3 SERPL-MCNC: 37.9 NG/ML (ref 30–100)
ALBUMIN SERPL BCP-MCNC: 4.1 G/DL (ref 3.5–5)
ALP SERPL-CCNC: 79 U/L (ref 46–116)
ALT SERPL W P-5'-P-CCNC: 88 U/L (ref 12–78)
ANION GAP SERPL CALCULATED.3IONS-SCNC: 5 MMOL/L (ref 4–13)
AST SERPL W P-5'-P-CCNC: 39 U/L (ref 5–45)
BASOPHILS # BLD AUTO: 0.08 THOUSANDS/ΜL (ref 0–0.1)
BASOPHILS NFR BLD AUTO: 1 % (ref 0–1)
BILIRUB SERPL-MCNC: 1.33 MG/DL (ref 0.2–1)
BUN SERPL-MCNC: 12 MG/DL (ref 5–25)
CALCIUM SERPL-MCNC: 10.1 MG/DL (ref 8.3–10.1)
CHLORIDE SERPL-SCNC: 106 MMOL/L (ref 100–108)
CHOLEST SERPL-MCNC: 76 MG/DL (ref 50–200)
CO2 SERPL-SCNC: 29 MMOL/L (ref 21–32)
CREAT SERPL-MCNC: 1.06 MG/DL (ref 0.6–1.3)
CREAT UR-MCNC: 303 MG/DL
EOSINOPHIL # BLD AUTO: 0.65 THOUSAND/ΜL (ref 0–0.61)
EOSINOPHIL NFR BLD AUTO: 8 % (ref 0–6)
ERYTHROCYTE [DISTWIDTH] IN BLOOD BY AUTOMATED COUNT: 12.1 % (ref 11.6–15.1)
EST. AVERAGE GLUCOSE BLD GHB EST-MCNC: 111 MG/DL
GFR SERPL CREATININE-BSD FRML MDRD: 76 ML/MIN/1.73SQ M
GLUCOSE P FAST SERPL-MCNC: 106 MG/DL (ref 65–99)
HBA1C MFR BLD: 5.5 % (ref 4.2–6.3)
HCT VFR BLD AUTO: 42 % (ref 36.5–49.3)
HDLC SERPL-MCNC: 35 MG/DL
HGB BLD-MCNC: 13.6 G/DL (ref 12–17)
IMM GRANULOCYTES # BLD AUTO: 0.01 THOUSAND/UL (ref 0–0.2)
IMM GRANULOCYTES NFR BLD AUTO: 0 % (ref 0–2)
LDLC SERPL CALC-MCNC: 16 MG/DL (ref 0–100)
LYMPHOCYTES # BLD AUTO: 2.58 THOUSANDS/ΜL (ref 0.6–4.47)
LYMPHOCYTES NFR BLD AUTO: 31 % (ref 14–44)
MCH RBC QN AUTO: 31.1 PG (ref 26.8–34.3)
MCHC RBC AUTO-ENTMCNC: 32.4 G/DL (ref 31.4–37.4)
MCV RBC AUTO: 96 FL (ref 82–98)
MICROALBUMIN UR-MCNC: 646 MG/L (ref 0–20)
MICROALBUMIN/CREAT 24H UR: 213 MG/G CREATININE (ref 0–30)
MONOCYTES # BLD AUTO: 0.62 THOUSAND/ΜL (ref 0.17–1.22)
MONOCYTES NFR BLD AUTO: 8 % (ref 4–12)
NEUTROPHILS # BLD AUTO: 4.28 THOUSANDS/ΜL (ref 1.85–7.62)
NEUTS SEG NFR BLD AUTO: 52 % (ref 43–75)
NONHDLC SERPL-MCNC: 41 MG/DL
NRBC BLD AUTO-RTO: 0 /100 WBCS
PLATELET # BLD AUTO: 247 THOUSANDS/UL (ref 149–390)
PMV BLD AUTO: 9.9 FL (ref 8.9–12.7)
POTASSIUM SERPL-SCNC: 4.4 MMOL/L (ref 3.5–5.3)
PROT SERPL-MCNC: 8.5 G/DL (ref 6.4–8.2)
RBC # BLD AUTO: 4.37 MILLION/UL (ref 3.88–5.62)
SODIUM SERPL-SCNC: 140 MMOL/L (ref 136–145)
T4 FREE SERPL-MCNC: 1.03 NG/DL (ref 0.76–1.46)
TRIGL SERPL-MCNC: 126 MG/DL
TSH SERPL DL<=0.05 MIU/L-ACNC: 3.18 UIU/ML (ref 0.36–3.74)
WBC # BLD AUTO: 8.22 THOUSAND/UL (ref 4.31–10.16)

## 2019-12-27 PROCEDURE — 80053 COMPREHEN METABOLIC PANEL: CPT

## 2019-12-27 PROCEDURE — 83036 HEMOGLOBIN GLYCOSYLATED A1C: CPT

## 2019-12-27 PROCEDURE — 85025 COMPLETE CBC W/AUTO DIFF WBC: CPT

## 2019-12-27 PROCEDURE — 82306 VITAMIN D 25 HYDROXY: CPT

## 2019-12-27 PROCEDURE — 36415 COLL VENOUS BLD VENIPUNCTURE: CPT

## 2019-12-27 PROCEDURE — 82570 ASSAY OF URINE CREATININE: CPT | Performed by: FAMILY MEDICINE

## 2019-12-27 PROCEDURE — 82043 UR ALBUMIN QUANTITATIVE: CPT | Performed by: FAMILY MEDICINE

## 2019-12-27 PROCEDURE — 80061 LIPID PANEL: CPT

## 2019-12-27 PROCEDURE — 84443 ASSAY THYROID STIM HORMONE: CPT

## 2019-12-27 PROCEDURE — 84439 ASSAY OF FREE THYROXINE: CPT

## 2019-12-27 PROCEDURE — 3060F POS MICROALBUMINURIA REV: CPT | Performed by: FAMILY MEDICINE

## 2019-12-30 ENCOUNTER — CLINICAL SUPPORT (OUTPATIENT)
Dept: CARDIAC REHAB | Age: 59
End: 2019-12-30
Payer: COMMERCIAL

## 2019-12-30 DIAGNOSIS — I21.4 NSTEMI (NON-ST ELEVATED MYOCARDIAL INFARCTION) (HCC): ICD-10-CM

## 2019-12-30 DIAGNOSIS — Z95.5 STENTED CORONARY ARTERY: ICD-10-CM

## 2019-12-30 PROCEDURE — 93798 PHYS/QHP OP CAR RHAB W/ECG: CPT

## 2019-12-31 ENCOUNTER — TELEPHONE (OUTPATIENT)
Dept: ENDOCRINOLOGY | Facility: CLINIC | Age: 59
End: 2019-12-31

## 2019-12-31 ENCOUNTER — OFFICE VISIT (OUTPATIENT)
Dept: FAMILY MEDICINE CLINIC | Facility: CLINIC | Age: 59
End: 2019-12-31
Payer: COMMERCIAL

## 2019-12-31 VITALS
SYSTOLIC BLOOD PRESSURE: 100 MMHG | DIASTOLIC BLOOD PRESSURE: 68 MMHG | BODY MASS INDEX: 27.3 KG/M2 | HEIGHT: 73 IN | WEIGHT: 206 LBS | TEMPERATURE: 96.1 F

## 2019-12-31 DIAGNOSIS — E78.2 MIXED HYPERLIPIDEMIA: ICD-10-CM

## 2019-12-31 DIAGNOSIS — I10 ESSENTIAL HYPERTENSION: ICD-10-CM

## 2019-12-31 DIAGNOSIS — E11.00 TYPE 2 DIABETES MELLITUS WITH HYPEROSMOLARITY WITHOUT COMA, WITHOUT LONG-TERM CURRENT USE OF INSULIN (HCC): Primary | ICD-10-CM

## 2019-12-31 DIAGNOSIS — K04.7 DENTAL ABSCESS: ICD-10-CM

## 2019-12-31 DIAGNOSIS — Z12.11 SCREENING FOR COLON CANCER: ICD-10-CM

## 2019-12-31 DIAGNOSIS — Z95.5 S/P CORONARY ARTERY STENT PLACEMENT: ICD-10-CM

## 2019-12-31 DIAGNOSIS — J40 BRONCHITIS: ICD-10-CM

## 2019-12-31 DIAGNOSIS — I25.10 CORONARY ARTERY DISEASE INVOLVING NATIVE CORONARY ARTERY OF NATIVE HEART WITHOUT ANGINA PECTORIS: ICD-10-CM

## 2019-12-31 DIAGNOSIS — I21.4 NSTEMI (NON-ST ELEVATED MYOCARDIAL INFARCTION) (HCC): ICD-10-CM

## 2019-12-31 DIAGNOSIS — E11.9 TYPE 2 DIABETES MELLITUS WITHOUT COMPLICATION, UNSPECIFIED WHETHER LONG TERM INSULIN USE (HCC): ICD-10-CM

## 2019-12-31 PROCEDURE — 3074F SYST BP LT 130 MM HG: CPT | Performed by: FAMILY MEDICINE

## 2019-12-31 PROCEDURE — 99214 OFFICE O/P EST MOD 30 MIN: CPT | Performed by: FAMILY MEDICINE

## 2019-12-31 PROCEDURE — 3008F BODY MASS INDEX DOCD: CPT | Performed by: FAMILY MEDICINE

## 2019-12-31 PROCEDURE — 1036F TOBACCO NON-USER: CPT | Performed by: FAMILY MEDICINE

## 2019-12-31 PROCEDURE — 3078F DIAST BP <80 MM HG: CPT | Performed by: FAMILY MEDICINE

## 2019-12-31 RX ORDER — NIACIN 1000 MG/1
1000 TABLET, EXTENDED RELEASE ORAL
Qty: 30 TABLET | Refills: 5 | Status: SHIPPED | OUTPATIENT
Start: 2019-12-31 | End: 2020-04-20 | Stop reason: SDUPTHER

## 2019-12-31 RX ORDER — EZETIMIBE 10 MG/1
10 TABLET ORAL DAILY
Qty: 30 TABLET | Refills: 5 | Status: SHIPPED | OUTPATIENT
Start: 2019-12-31 | End: 2020-04-20 | Stop reason: SDUPTHER

## 2019-12-31 RX ORDER — ROSUVASTATIN CALCIUM 40 MG/1
40 TABLET, COATED ORAL DAILY
Qty: 30 TABLET | Refills: 5 | Status: SHIPPED | OUTPATIENT
Start: 2019-12-31 | End: 2020-04-20 | Stop reason: SDUPTHER

## 2019-12-31 RX ORDER — METOPROLOL TARTRATE 50 MG/1
50 TABLET, FILM COATED ORAL EVERY 12 HOURS SCHEDULED
Qty: 60 TABLET | Refills: 5 | Status: SHIPPED | OUTPATIENT
Start: 2019-12-31 | End: 2020-04-20 | Stop reason: SDUPTHER

## 2019-12-31 RX ORDER — LOSARTAN POTASSIUM 25 MG/1
12.5 TABLET ORAL DAILY
Qty: 30 TABLET | Refills: 5 | Status: SHIPPED | OUTPATIENT
Start: 2019-12-31 | End: 2020-04-20 | Stop reason: SDUPTHER

## 2019-12-31 NOTE — PROGRESS NOTES
Assessment/Plan:  A1c 5 5  Labs urine reviewed the patient  Diabetes hypertension hyperlipidemia all stable at this present time  Patient is in cardiac rehab  Patient will increase water intake  Patient will see oral surgeon for dental issues  Patient will complete clindamycin  Diagnoses and all orders for this visit:    Screening for colon cancer  -     Katelyn; Future    Type 2 diabetes mellitus with hyperosmolarity without coma, without long-term current use of insulin ([facility] )  -     Ambulatory Referral to Ophthalmology; Future    Type 2 diabetes mellitus without complication, unspecified whether long term insulin use ([facility])  -     ezetimibe (ZETIA) 10 mg tablet; Take 1 tablet (10 mg total) by mouth daily  -     metFORMIN (GLUCOPHAGE) 1000 MG tablet; Take 1 tablet (1,000 mg total) by mouth 2 (two) times a day  -     niacin (NIASPAN) 1000 MG CR tablet; Take 1 tablet (1,000 mg total) by mouth daily at bedtime  -     rosuvastatin (CRESTOR) 40 MG tablet; Take 1 tablet (40 mg total) by mouth daily    S/P coronary artery stent placement  -     losartan (COZAAR) 25 mg tablet; Take 0 5 tablets (12 5 mg total) by mouth daily    Bronchitis  -     metFORMIN (GLUCOPHAGE) 1000 MG tablet; Take 1 tablet (1,000 mg total) by mouth 2 (two) times a day    NSTEMI (non-ST elevated myocardial infarction) ([facility])  -     metoprolol tartrate (LOPRESSOR) 50 mg tablet; Take 1 tablet (50 mg total) by mouth every 12 (twelve) hours  -     ticagrelor (BRILINTA) 90 MG; Take 1 tablet (90 mg total) by mouth every 12 (twelve) hours    Coronary artery disease involving native coronary artery of native heart without angina pectoris    Essential hypertension    Mixed hyperlipidemia            Subjective:        Patient ID: Morenita Montana is a 60 y o  male  Patient here to follow-up on diabetes hypertension hyperlipidemia CAD  Patient had laboratory studies done  Patient with some low blood pressures with cardiac rehab    The no syncope  No chest pain shortness of breath  Patient with ongoing dental issues  Patient is seeing dentist         The following portions of the patient's history were reviewed and updated as appropriate: allergies, current medications, past family history, past medical history, past social history, past surgical history and problem list       Review of Systems   Constitutional: Negative  HENT: Negative  Eyes: Negative  Respiratory: Negative  Cardiovascular: Negative  Gastrointestinal: Negative  Endocrine: Negative  Genitourinary: Negative  Musculoskeletal: Negative  Skin: Negative  Allergic/Immunologic: Negative  Neurological: Negative  Hematological: Negative  Psychiatric/Behavioral: Negative  Objective:               /68 (BP Location: Right arm, Patient Position: Sitting, Cuff Size: Adult)   Temp (!) 96 1 °F (35 6 °C) (Tympanic)   Ht 6' 1" (1 854 m)   Wt 93 4 kg (206 lb)   BMI 27 18 kg/m²          Physical Exam   Constitutional: He appears well-developed and well-nourished  No distress  HENT:   Head: Normocephalic  Right Ear: External ear normal    Left Ear: External ear normal    Mouth/Throat: Oropharyngeal exudate present  Dental caries   Eyes: Pupils are equal, round, and reactive to light  EOM are normal  Right eye exhibits no discharge  Left eye exhibits no discharge  No scleral icterus  Neck: Normal range of motion  Neck supple  No thyromegaly present  Cardiovascular: Normal rate, regular rhythm, normal heart sounds and intact distal pulses  Exam reveals no gallop and no friction rub  No murmur heard  Pulmonary/Chest: Effort normal and breath sounds normal  No respiratory distress  He has no wheezes  He has no rales  He exhibits no tenderness  Abdominal: Soft  Bowel sounds are normal  He exhibits no distension  There is no tenderness  There is no rebound and no guarding  Musculoskeletal: Normal range of motion   He exhibits no edema or tenderness  Lymphadenopathy:     He has no cervical adenopathy  Neurological: He is alert  No cranial nerve deficit  He exhibits normal muscle tone  Coordination normal    Skin: Skin is warm and dry  No rash noted  He is not diaphoretic  No erythema  No pallor  Psychiatric: He has a normal mood and affect  His behavior is normal  Judgment and thought content normal    Nursing note and vitals reviewed

## 2019-12-31 NOTE — TELEPHONE ENCOUNTER
Called pt and left message  Reviewed labs normal A1c 5 5, vitamin-D 37, free T4 1 02, TSH 3 1, lipid profile normal, CMP normal except for transaminitis  CBC within normal limits  This can be reviewed during follow-up on 2/5/20

## 2020-01-02 ENCOUNTER — CLINICAL SUPPORT (OUTPATIENT)
Dept: CARDIAC REHAB | Age: 60
End: 2020-01-02
Payer: COMMERCIAL

## 2020-01-02 DIAGNOSIS — I21.4 NSTEMI (NON-ST ELEVATED MYOCARDIAL INFARCTION) (HCC): ICD-10-CM

## 2020-01-02 DIAGNOSIS — Z95.5 STENTED CORONARY ARTERY: ICD-10-CM

## 2020-01-02 PROCEDURE — 93798 PHYS/QHP OP CAR RHAB W/ECG: CPT

## 2020-01-03 ENCOUNTER — CLINICAL SUPPORT (OUTPATIENT)
Dept: CARDIAC REHAB | Age: 60
End: 2020-01-03
Payer: COMMERCIAL

## 2020-01-03 DIAGNOSIS — I21.4 NSTEMI (NON-ST ELEVATED MYOCARDIAL INFARCTION) (HCC): ICD-10-CM

## 2020-01-03 DIAGNOSIS — Z95.5 STENTED CORONARY ARTERY: ICD-10-CM

## 2020-01-03 PROCEDURE — 93798 PHYS/QHP OP CAR RHAB W/ECG: CPT

## 2020-01-06 ENCOUNTER — CLINICAL SUPPORT (OUTPATIENT)
Dept: CARDIAC REHAB | Age: 60
End: 2020-01-06
Payer: COMMERCIAL

## 2020-01-06 DIAGNOSIS — Z95.5 STENTED CORONARY ARTERY: ICD-10-CM

## 2020-01-06 DIAGNOSIS — I21.4 NSTEMI (NON-ST ELEVATED MYOCARDIAL INFARCTION) (HCC): ICD-10-CM

## 2020-01-06 PROCEDURE — 93798 PHYS/QHP OP CAR RHAB W/ECG: CPT

## 2020-01-08 ENCOUNTER — CLINICAL SUPPORT (OUTPATIENT)
Dept: CARDIAC REHAB | Age: 60
End: 2020-01-08
Payer: COMMERCIAL

## 2020-01-08 DIAGNOSIS — I21.4 NSTEMI (NON-ST ELEVATED MYOCARDIAL INFARCTION) (HCC): ICD-10-CM

## 2020-01-08 PROCEDURE — 93798 PHYS/QHP OP CAR RHAB W/ECG: CPT

## 2020-01-10 ENCOUNTER — CLINICAL SUPPORT (OUTPATIENT)
Dept: CARDIAC REHAB | Age: 60
End: 2020-01-10
Payer: COMMERCIAL

## 2020-01-10 ENCOUNTER — OFFICE VISIT (OUTPATIENT)
Dept: FAMILY MEDICINE CLINIC | Facility: CLINIC | Age: 60
End: 2020-01-10
Payer: COMMERCIAL

## 2020-01-10 VITALS
BODY MASS INDEX: 27.7 KG/M2 | WEIGHT: 209 LBS | TEMPERATURE: 96.8 F | DIASTOLIC BLOOD PRESSURE: 66 MMHG | SYSTOLIC BLOOD PRESSURE: 102 MMHG | HEIGHT: 73 IN

## 2020-01-10 DIAGNOSIS — Z95.5 STENTED CORONARY ARTERY: ICD-10-CM

## 2020-01-10 DIAGNOSIS — J01.10 ACUTE NON-RECURRENT FRONTAL SINUSITIS: Primary | ICD-10-CM

## 2020-01-10 DIAGNOSIS — I21.4 NSTEMI (NON-ST ELEVATED MYOCARDIAL INFARCTION) (HCC): ICD-10-CM

## 2020-01-10 PROCEDURE — 99213 OFFICE O/P EST LOW 20 MIN: CPT | Performed by: FAMILY MEDICINE

## 2020-01-10 PROCEDURE — 3008F BODY MASS INDEX DOCD: CPT | Performed by: FAMILY MEDICINE

## 2020-01-10 PROCEDURE — 93798 PHYS/QHP OP CAR RHAB W/ECG: CPT

## 2020-01-10 RX ORDER — CLINDAMYCIN HYDROCHLORIDE 150 MG/1
CAPSULE ORAL
COMMUNITY
Start: 2019-12-27 | End: 2020-01-10

## 2020-01-10 RX ORDER — LEVOFLOXACIN 500 MG/1
500 TABLET, FILM COATED ORAL EVERY 24 HOURS
Qty: 10 TABLET | Refills: 0 | Status: SHIPPED | OUTPATIENT
Start: 2020-01-10 | End: 2020-01-20

## 2020-01-10 NOTE — PROGRESS NOTES
Assessment/Plan:       Diagnoses and all orders for this visit:    Acute non-recurrent frontal sinusitis  -     levofloxacin (LEVAQUIN) 500 mg tablet; Take 1 tablet (500 mg total) by mouth every 24 hours for 10 days    Other orders  -     Discontinue: clindamycin (CLEOCIN) 150 mg capsule            Subjective:        Patient ID: Charles Thurman is a 63 y o  male  Patient is here with bilateral ear pain  Patient did use Keflex as well as clindamycin  Patient having dental issues  No fever        The following portions of the patient's history were reviewed and updated as appropriate: allergies, current medications, past family history, past medical history, past social history, past surgical history and problem list       Review of Systems   Constitutional: Negative  Negative for fever  HENT: Positive for ear pain, postnasal drip, rhinorrhea, sinus pressure, sinus pain and sore throat  Eyes: Negative  Respiratory: Negative  Cardiovascular: Negative  Gastrointestinal: Negative  Endocrine: Negative  Genitourinary: Negative  Musculoskeletal: Negative  Skin: Negative  Allergic/Immunologic: Negative  Neurological: Negative  Hematological: Negative  Psychiatric/Behavioral: Negative  Objective:      BMI Counseling: Body mass index is 27 57 kg/m²  The BMI is above normal  Nutrition recommendations include decreasing portion sizes  Exercise recommendations include moderate physical activity 150 minutes/week  /66 (BP Location: Right arm, Patient Position: Sitting, Cuff Size: Adult)   Temp (!) 96 8 °F (36 °C) (Tympanic)   Ht 6' 1" (1 854 m)   Wt 94 8 kg (209 lb)   BMI 27 57 kg/m²          Physical Exam   Constitutional: He appears well-developed and well-nourished  No distress  HENT:   Head: Normocephalic  Right Ear: External ear normal    Left Ear: External ear normal    Mouth/Throat: Oropharyngeal exudate present  Multiple dental issues  Eyes: Pupils are equal, round, and reactive to light  EOM are normal  Right eye exhibits no discharge  Left eye exhibits no discharge  No scleral icterus  Neck: Normal range of motion  Neck supple  No thyromegaly present  Cardiovascular: Normal rate, regular rhythm, normal heart sounds and intact distal pulses  Exam reveals no gallop and no friction rub  No murmur heard  Pulmonary/Chest: Effort normal and breath sounds normal  No respiratory distress  He has no wheezes  He has no rales  He exhibits no tenderness  Musculoskeletal: Normal range of motion  He exhibits no edema or tenderness  Lymphadenopathy:     He has no cervical adenopathy  Neurological: He is alert  No cranial nerve deficit  He exhibits normal muscle tone  Coordination normal    Skin: Skin is warm and dry  No rash noted  He is not diaphoretic  No erythema  No pallor  Nursing note and vitals reviewed

## 2020-01-13 NOTE — PROGRESS NOTES
Cardiac Rehabilitation Plan of Care   Discharge      Today's date: 2020   Visits: 36  Patient name: Rock Parikh      : [birth year]  Age: 63 y o  MRN: [id number]  Referring Physician: Frieda Duval PA-C  Cardiologist: Delbert Jarrett MD  Provider: Arnulfo Bauer  Clinician: Yari Quintanilla, MS, CEP, CCRP    Dx:   Encounter Diagnoses   Name Primary?  NSTEMI (non-ST elevated myocardial infarction) ([facility] [address] )     Stented coronary artery      Date of onset: 19      SUMMARY OF PROGRESS:   This is a final note for Renaldo  He had amazing outcomes d/t his focus and dedication to making and sustaining healthy lifestyle modifications  He had a 35% improvement in his functional capacity completing 5 8 METs in the submaximal TM ETT  Test terminated at RPE 6  His exercise capacity increased by 43%  He completes 40 mins at 3 5-5 8 METs plus wt training  No cardiac complaints  RHR 75-80, ExHR   NSR on tele with no ectopy observed  His resting BP is always low 102/62 - 110/60 and increases appropriately with exercise reaching 132/70 - 146/80  Recovery BP drops as expected however, there has been a few sessions where it has gone as low as 70/50  He maintains good hydration and is never symptomatic with this low BP  He has focused on healthy eating and lost 18lbs and 2 5 inches on his waist   His A1c dropped to 5 5% from 6 5%  He has abstained from smoking  He attended all education classes  He reports high stress but is coping better and keeps it all in perspective  PHQ-9 depression score reduced from a 12 to a 0  He plans to join a gym near his home and continue with at least 3 days/wk  I advised a minimum of 150 mins/wk at a moderate intensity  He hopes to continue loosing wt with the goal of 195lbs      Medication compliance: Yes   Comments:   Fall Risk: Low   Comments:     EKG changes: none      EXERCISE ASSESSMENT and PLAN    Current Exercise Program in Rehab:       Frequency: 3 days/week        Minutes: 40        METS: 3 5-5 8           HR:    RPE: 4-6         Modalities: Treadmill, Airdyne bike, UBE, Lifecycle and Rower      Exercise Goals :    Frequency: 3 -5 days/week    Minutes: 40- 60   150-200 mins/wk   METS: 3 5-6 0   HR:    RPE: 4-6   Modalities: Treadmill, Airdyne bike, UBE, Lifecycle, Elliptical and Rower    Strength trainin-3 days / week  12-15 repetitions  1-2 sets per modality    Modalities: Leg Press, Chest Press, Pull Downs, Lateral Raise, Arm Extension and Arm Curl    Progressing:  Yes - increased intensity levels and duration, goals met: 10% improvement in functional capacity, Reduced Dyspnea with physical activity  0-1/10, Increase in peak CR METs by 40%, Resume ADLs with increased strength     Home Exercise: None  Goals: Exercise 5 days/wk, >150mins/wk  Education: Benefit of exercise for CAD risk factors, signs and sxs, RPE scale and class: Risk Factors for Heart Disease , education on home exercise guidelines  Plan: gym near his home  Readiness to change: Maintenance: (Maintaining the behavior change)      NUTRITION ASSESSMENT AND PLAN    Weight control:    Starting weight: 219   Current weight:  201  Waist circumference:    Startin 5   Current:    Diabetes: Patient reported fasting BS   Lipid management: Discussed diet and lipid management and last lipid profile 2016  Goals:reduced BMI to < 25, decreased body fat% <25%    Education: heart healthy eating  low sodium diet  diet and lipid management  diabetes management and exercise  wt   Loss  Education class: Reading Food Labels  Education Class: Heart Healthy Eating  Progressing: Yes - patient reports cutting out red meat and reducing refined grains, no added salt, goals met:  , fasting BG , improved A1c  < 6 5%, Improved Rate Your Plate score  >76 and 2 5-5%  wt loss  Goals not met:  reduced BMI to < 25, decreased body fat% <25%  Plan: Increase PUFA and MUFA, Decrease SFA, Increase whole grains, increase fruits/vegs, eliminate processed meats, reduce red meat 1x/wk, swtich to low fat dairy and Reduce added sugars <25g/day  Readiness to change: Action:  (Changing behavior) and Maintenance: (Maintaining the behavior change)      PSYCHOSOCIAL ASSESSMENT AND PLAN    Emotional:  PHQ-9 = 0 - no depression  Self-reported stress level: 2/10   - work related  Social support: Excellent  Goals:  Manage stress  Education: benefits of positive support system and coping mechanisms, Class: Stress and Your Health and Class: Relaxation  Progressing: Yes - reduced stress level and reports that he doesn't worry about things if they are going to increase his stress   Goals met:  Reduce perceived stress to 1-310, PHQ-9 - reduced severity by one level, behavioral health consult, Feelings in [facility] Score < 3, Physical Fitness in [facility] Score < 3, Daily Activity in [facility] Score < 3, Overall Health in [facility] Score < 3, Increased interest in doing things, improved concentration and increased energy  Plan: regular exercise, practice relaxation methods  Readiness to change: Action:  (Changing behavior)      OTHER CORE COMPONENTS     Tobacco:   Social History     Tobacco Use   Smoking Status Former Smoker    Packs/day: 1 00    Years: 40 00    Pack years: 40 00    Types: Cigarettes    Last attempt to quit: 2019    Years since quittin 3   Smokeless Tobacco Never Used   Tobacco Comment    he has quit several times before       Tobacco Use Intervention: Referral to tobacco expert:   Smoked 1 5 ppd x 35 years                    Has abstained since MI        Brief counseling by cardiac rehab professional  Date: 19, 1/10/20    Blood pressure:    Restin/62 - 110/60   Exercise: 132/70 - 146/80    Goals: reduced dietary sodium <2300mg and consistent exercise >150 mins/wk  Education:  understanding HTN and CAD, low sodium diet and HTN, Education class:  Common Heart Medications, Education class: Understanding Heart Disease, smoking and heart disease and tobacco triggers  Progressing: Yes - attended both education classes offered, has abstained from smoking, goals met: reduced dietary sodium <2300mg and consistent exercise >150 mins/wk, complete abstention from smoking  Plan: Complete abstention from smoking  Readiness to change: Maintenance: (Maintaining the behavior change)

## 2020-02-06 ENCOUNTER — TELEPHONE (OUTPATIENT)
Dept: CARDIOLOGY CLINIC | Facility: CLINIC | Age: 60
End: 2020-02-06

## 2020-02-06 NOTE — TELEPHONE ENCOUNTER
Pt having dental extraction of remaining upper teeth on 2/19/20  Had stent on 8/26/19  They would like him to hold Brilinta 2-3 days prior to procedure          Please advise

## 2020-02-08 NOTE — TELEPHONE ENCOUNTER
Unless he absolutely needs to stop his Brilinta, I do not recommend it being held until August 2020

## 2020-02-10 ENCOUNTER — OFFICE VISIT (OUTPATIENT)
Dept: FAMILY MEDICINE CLINIC | Facility: CLINIC | Age: 60
End: 2020-02-10
Payer: COMMERCIAL

## 2020-02-10 VITALS
SYSTOLIC BLOOD PRESSURE: 98 MMHG | DIASTOLIC BLOOD PRESSURE: 92 MMHG | WEIGHT: 205 LBS | HEIGHT: 73 IN | BODY MASS INDEX: 27.17 KG/M2 | TEMPERATURE: 96.9 F

## 2020-02-10 DIAGNOSIS — E11.00 TYPE 2 DIABETES MELLITUS WITH HYPEROSMOLARITY WITHOUT COMA, WITHOUT LONG-TERM CURRENT USE OF INSULIN (HCC): Primary | ICD-10-CM

## 2020-02-10 DIAGNOSIS — I10 ESSENTIAL HYPERTENSION: ICD-10-CM

## 2020-02-10 DIAGNOSIS — E78.2 MIXED HYPERLIPIDEMIA: ICD-10-CM

## 2020-02-10 DIAGNOSIS — I21.4 NSTEMI (NON-ST ELEVATED MYOCARDIAL INFARCTION) (HCC): ICD-10-CM

## 2020-02-10 PROCEDURE — 3066F NEPHROPATHY DOC TX: CPT | Performed by: FAMILY MEDICINE

## 2020-02-10 PROCEDURE — 99214 OFFICE O/P EST MOD 30 MIN: CPT | Performed by: FAMILY MEDICINE

## 2020-02-10 PROCEDURE — 1036F TOBACCO NON-USER: CPT | Performed by: FAMILY MEDICINE

## 2020-02-10 PROCEDURE — 3008F BODY MASS INDEX DOCD: CPT | Performed by: FAMILY MEDICINE

## 2020-02-10 PROCEDURE — 3080F DIAST BP >= 90 MM HG: CPT | Performed by: FAMILY MEDICINE

## 2020-02-10 PROCEDURE — 3074F SYST BP LT 130 MM HG: CPT | Performed by: FAMILY MEDICINE

## 2020-02-10 NOTE — PROGRESS NOTES
Assessment/Plan:  Labs reviewed  Diabetes hypertension hyperlipidemia stable at the present time  Continue with current regimen  Patient will be going for dental extractions in the near future  Patient will see ophthalmologist   Patient will follow with Cardiology per routine  Follow-up 3 months       Diagnoses and all orders for this visit:    Type 2 diabetes mellitus with hyperosmolarity without coma, without long-term current use of insulin (HCC)    NSTEMI (non-ST elevated myocardial infarction) ([facility] [address] )    Essential hypertension    Mixed hyperlipidemia            Subjective:        Patient ID: Fior Thomas is a 63 y o  male  Patient follow-up on diabetes hypertension hyperlipidemia CAD and dental issues  Patient is having some dental pain  Patient is having some extractions on the 19th  Patient is going to the gym daily  Patient is trying to lose weight  No new chest pain shortness of breath  No problems urinating or defecating  Patient is going to see ophthalmologist   No foot related issues  All other review systems negative  The following portions of the patient's history were reviewed and updated as appropriate: allergies, current medications, past family history, past medical history, past social history, past surgical history and problem list       Review of Systems   Constitutional: Negative  HENT: Positive for dental problem  Eyes: Negative  Respiratory: Negative  Cardiovascular: Negative  Gastrointestinal: Negative  Endocrine: Negative  Genitourinary: Negative  Musculoskeletal: Negative  Skin: Negative  Allergic/Immunologic: Negative  Neurological: Negative  Hematological: Negative  Psychiatric/Behavioral: Negative              Objective:               BP 98/92 (BP Location: Right arm, Patient Position: Sitting, Cuff Size: Large)   Temp (!) 96 9 °F (36 1 °C) (Tympanic)   Ht 6' 1" (1 854 m)   Wt 93 kg (205 lb)   BMI 27 05 kg/m² Physical Exam   Constitutional: He appears well-developed and well-nourished  No distress  HENT:   Head: Normocephalic  Right Ear: External ear normal    Left Ear: External ear normal    Mouth/Throat: Oropharynx is clear and moist  No oropharyngeal exudate  Dental caries   Eyes: Pupils are equal, round, and reactive to light  EOM are normal  Right eye exhibits no discharge  Left eye exhibits no discharge  No scleral icterus  Neck: Normal range of motion  Neck supple  No thyromegaly present  Cardiovascular: Normal rate, regular rhythm, normal heart sounds and intact distal pulses  Exam reveals no gallop and no friction rub  No murmur heard  Pulmonary/Chest: Effort normal and breath sounds normal  No respiratory distress  He has no wheezes  He has no rales  He exhibits no tenderness  Abdominal: Soft  Bowel sounds are normal  He exhibits no distension  There is no tenderness  There is no rebound and no guarding  Musculoskeletal: Normal range of motion  He exhibits no edema or tenderness  Lymphadenopathy:     He has no cervical adenopathy  Neurological: He is alert  No cranial nerve deficit  He exhibits normal muscle tone  Coordination normal    Skin: Skin is warm and dry  No rash noted  He is not diaphoretic  No erythema  No pallor  Psychiatric: He has a normal mood and affect  His behavior is normal  Judgment and thought content normal    Nursing note and vitals reviewed

## 2020-02-11 ENCOUNTER — TELEPHONE (OUTPATIENT)
Dept: CARDIOLOGY CLINIC | Facility: CLINIC | Age: 60
End: 2020-02-11

## 2020-02-11 NOTE — TELEPHONE ENCOUNTER
Oral surgeon is calling back, They are aware you do not recommend pt to go off Brilinta until August       They are asking again for 2 day hold of Brilinta because he is having 13 teeth extracted at one time  OR come off of ASA and stay on Brilinta?       Please advise

## 2020-02-11 NOTE — TELEPHONE ENCOUNTER
Patient called and would like to speak to Dr Cecilia Cantu directly regarding the [address]    He would not accept a call back from the nurse, Dr Cecilia Cantu only    Thank you

## 2020-03-06 ENCOUNTER — TELEPHONE (OUTPATIENT)
Dept: CARDIOLOGY CLINIC | Facility: CLINIC | Age: 60
End: 2020-03-06

## 2020-03-06 NOTE — TELEPHONE ENCOUNTER
Pt called, asked to speak with you  He is c/o erectile dysfunction and would like your advice / recommendations  Asked if it could be medication related      Pt Ph:  [phone]

## 2020-03-12 DIAGNOSIS — N52.01 ERECTILE DYSFUNCTION DUE TO ARTERIAL INSUFFICIENCY: ICD-10-CM

## 2020-03-12 DIAGNOSIS — I21.4 NSTEMI (NON-ST ELEVATED MYOCARDIAL INFARCTION) (HCC): ICD-10-CM

## 2020-03-12 DIAGNOSIS — I10 ESSENTIAL HYPERTENSION: Primary | ICD-10-CM

## 2020-03-12 NOTE — TELEPHONE ENCOUNTER
Preeti Ma called back and he would appreciate a medication for erectile dysfunction  Script entered

## 2020-03-14 RX ORDER — TADALAFIL 10 MG/1
10 TABLET ORAL DAILY PRN
Qty: 10 TABLET | Refills: 3 | Status: SHIPPED | OUTPATIENT
Start: 2020-03-14 | End: 2020-04-20 | Stop reason: SDUPTHER

## 2020-03-16 ENCOUNTER — TELEPHONE (OUTPATIENT)
Dept: CARDIOLOGY CLINIC | Facility: CLINIC | Age: 60
End: 2020-03-16

## 2020-03-16 NOTE — TELEPHONE ENCOUNTER
----- Message from Damion Guzman MD sent at 3/14/2020  9:39 AM EDT -----  Please let him know that I filled his Viagra but that he cannot take SL nitro for 12 hours prior to or after taking the Viagra

## 2020-03-16 NOTE — TELEPHONE ENCOUNTER
I called & judy/[facility], advised him of the contraindications with the nitro  He said he has never taken the nitro anyway

## 2020-03-18 ENCOUNTER — OFFICE VISIT (OUTPATIENT)
Dept: FAMILY MEDICINE CLINIC | Facility: CLINIC | Age: 60
End: 2020-03-18
Payer: COMMERCIAL

## 2020-03-18 VITALS
RESPIRATION RATE: 16 BRPM | SYSTOLIC BLOOD PRESSURE: 110 MMHG | HEART RATE: 68 BPM | WEIGHT: 203 LBS | HEIGHT: 73 IN | DIASTOLIC BLOOD PRESSURE: 64 MMHG | TEMPERATURE: 97.5 F | BODY MASS INDEX: 26.9 KG/M2

## 2020-03-18 DIAGNOSIS — J01.10 ACUTE NON-RECURRENT FRONTAL SINUSITIS: Primary | ICD-10-CM

## 2020-03-18 PROCEDURE — 99213 OFFICE O/P EST LOW 20 MIN: CPT | Performed by: FAMILY MEDICINE

## 2020-03-18 PROCEDURE — 3066F NEPHROPATHY DOC TX: CPT | Performed by: FAMILY MEDICINE

## 2020-03-18 PROCEDURE — 3074F SYST BP LT 130 MM HG: CPT | Performed by: FAMILY MEDICINE

## 2020-03-18 PROCEDURE — 1036F TOBACCO NON-USER: CPT | Performed by: FAMILY MEDICINE

## 2020-03-18 PROCEDURE — 3078F DIAST BP <80 MM HG: CPT | Performed by: FAMILY MEDICINE

## 2020-03-18 PROCEDURE — 3008F BODY MASS INDEX DOCD: CPT | Performed by: FAMILY MEDICINE

## 2020-03-18 RX ORDER — CHLORHEXIDINE GLUCONATE 0.12 MG/ML
RINSE ORAL
COMMUNITY
Start: 2020-02-24 | End: 2020-06-25

## 2020-03-18 RX ORDER — LEVOFLOXACIN 500 MG/1
500 TABLET, FILM COATED ORAL EVERY 24 HOURS
Qty: 10 TABLET | Refills: 0 | Status: SHIPPED | OUTPATIENT
Start: 2020-03-18 | End: 2020-03-28

## 2020-03-18 NOTE — PROGRESS NOTES
Assessment/Plan:       Diagnoses and all orders for this visit:    Acute non-recurrent frontal sinusitis  -     levofloxacin (LEVAQUIN) 500 mg tablet; Take 1 tablet (500 mg total) by mouth every 24 hours for 10 days    Other orders  -     chlorhexidine (PERIDEX) 0 12 % solution            Subjective:        Patient ID: Sander Mckeon is a 63 y o  male  Patient with bilateral ear pain  Patient status post dental procedure  No Fever  Patient with some sinus issues  No cough or sputum production noted  The following portions of the patient's history were reviewed and updated as appropriate: allergies, current medications, past family history, past medical history, past social history, past surgical history and problem list       Review of Systems   Constitutional: Negative  Negative for chills and fever  HENT: Positive for ear pain, postnasal drip, rhinorrhea, sinus pressure and sinus pain  Eyes: Negative  Respiratory: Negative  Negative for cough  Cardiovascular: Negative  Gastrointestinal: Negative  Endocrine: Negative  Genitourinary: Negative  Musculoskeletal: Negative  Skin: Negative  Allergic/Immunologic: Negative  Neurological: Negative  Hematological: Negative  Psychiatric/Behavioral: Negative  Objective:               /64 (BP Location: Left arm, Patient Position: Sitting, Cuff Size: Standard)   Pulse 68   Temp 97 5 °F (36 4 °C) (Tympanic)   Resp 16   Ht 6' 1" (1 854 m)   Wt 92 1 kg (203 lb)   BMI 26 78 kg/m²          Physical Exam   Constitutional: He appears well-developed and well-nourished  No distress  HENT:   Head: Normocephalic  Right Ear: External ear normal    Left Ear: External ear normal    Mouth/Throat: Oropharynx is clear and moist  No oropharyngeal exudate  Eyes: Pupils are equal, round, and reactive to light  EOM are normal  Right eye exhibits no discharge  Left eye exhibits no discharge  No scleral icterus  Neck: Normal range of motion  Neck supple  No thyromegaly present  Cardiovascular: Normal rate, regular rhythm, normal heart sounds and intact distal pulses  Exam reveals no gallop and no friction rub  No murmur heard  Pulmonary/Chest: Effort normal and breath sounds normal  No respiratory distress  He has no wheezes  He has no rales  He exhibits no tenderness  Musculoskeletal: Normal range of motion  He exhibits no edema or tenderness  Lymphadenopathy:     He has no cervical adenopathy  Neurological: He is alert  No cranial nerve deficit  He exhibits normal muscle tone  Coordination normal    Skin: Skin is warm and dry  No rash noted  He is not diaphoretic  No erythema  No pallor  Psychiatric: He has a normal mood and affect  His behavior is normal  Judgment and thought content normal    Nursing note and vitals reviewed

## 2020-04-02 ENCOUNTER — TELEMEDICINE (OUTPATIENT)
Dept: FAMILY MEDICINE CLINIC | Facility: CLINIC | Age: 60
End: 2020-04-02
Payer: COMMERCIAL

## 2020-04-02 DIAGNOSIS — J01.90 ACUTE SINUSITIS, RECURRENCE NOT SPECIFIED, UNSPECIFIED LOCATION: Primary | ICD-10-CM

## 2020-04-02 PROCEDURE — 99441 PR PHYS/QHP TELEPHONE EVALUATION 5-10 MIN: CPT | Performed by: FAMILY MEDICINE

## 2020-04-02 RX ORDER — ALBUTEROL SULFATE 90 UG/1
2 AEROSOL, METERED RESPIRATORY (INHALATION) EVERY 4 HOURS PRN
Qty: 1 INHALER | Refills: 0 | Status: SHIPPED | OUTPATIENT
Start: 2020-04-02 | End: 2020-06-25

## 2020-04-02 RX ORDER — CLINDAMYCIN HYDROCHLORIDE 300 MG/1
300 CAPSULE ORAL 3 TIMES DAILY
Qty: 21 CAPSULE | Refills: 0 | Status: SHIPPED | OUTPATIENT
Start: 2020-04-02 | End: 2020-04-09

## 2020-04-02 RX ORDER — FLUTICASONE PROPIONATE 50 MCG
2 SPRAY, SUSPENSION (ML) NASAL DAILY
Qty: 1 BOTTLE | Refills: 0 | Status: SHIPPED | OUTPATIENT
Start: 2020-04-02 | End: 2020-04-20

## 2020-04-20 ENCOUNTER — OFFICE VISIT (OUTPATIENT)
Dept: FAMILY MEDICINE CLINIC | Facility: CLINIC | Age: 60
End: 2020-04-20
Payer: COMMERCIAL

## 2020-04-20 VITALS
WEIGHT: 192.6 LBS | TEMPERATURE: 96.2 F | HEIGHT: 73 IN | DIASTOLIC BLOOD PRESSURE: 66 MMHG | BODY MASS INDEX: 25.53 KG/M2 | SYSTOLIC BLOOD PRESSURE: 104 MMHG

## 2020-04-20 DIAGNOSIS — E11.00 TYPE 2 DIABETES MELLITUS WITH HYPEROSMOLARITY WITHOUT COMA, WITHOUT LONG-TERM CURRENT USE OF INSULIN (HCC): Primary | ICD-10-CM

## 2020-04-20 DIAGNOSIS — Z95.5 S/P CORONARY ARTERY STENT PLACEMENT: ICD-10-CM

## 2020-04-20 DIAGNOSIS — E78.2 MIXED HYPERLIPIDEMIA: ICD-10-CM

## 2020-04-20 DIAGNOSIS — I21.4 NSTEMI (NON-ST ELEVATED MYOCARDIAL INFARCTION) (HCC): ICD-10-CM

## 2020-04-20 DIAGNOSIS — Z12.2 ENCOUNTER FOR SCREENING FOR LUNG CANCER: ICD-10-CM

## 2020-04-20 DIAGNOSIS — E11.9 TYPE 2 DIABETES MELLITUS WITHOUT COMPLICATION, UNSPECIFIED WHETHER LONG TERM INSULIN USE (HCC): ICD-10-CM

## 2020-04-20 DIAGNOSIS — I10 ESSENTIAL HYPERTENSION: ICD-10-CM

## 2020-04-20 DIAGNOSIS — N52.01 ERECTILE DYSFUNCTION DUE TO ARTERIAL INSUFFICIENCY: ICD-10-CM

## 2020-04-20 DIAGNOSIS — Z88.9 H/O SEASONAL ALLERGIES: ICD-10-CM

## 2020-04-20 DIAGNOSIS — I25.10 CORONARY ARTERY DISEASE INVOLVING NATIVE CORONARY ARTERY OF NATIVE HEART WITHOUT ANGINA PECTORIS: ICD-10-CM

## 2020-04-20 DIAGNOSIS — J40 BRONCHITIS: ICD-10-CM

## 2020-04-20 LAB — SL AMB POCT HEMOGLOBIN AIC: 5.6 (ref ?–6.5)

## 2020-04-20 PROCEDURE — 3044F HG A1C LEVEL LT 7.0%: CPT | Performed by: FAMILY MEDICINE

## 2020-04-20 PROCEDURE — 83036 HEMOGLOBIN GLYCOSYLATED A1C: CPT | Performed by: FAMILY MEDICINE

## 2020-04-20 PROCEDURE — 3074F SYST BP LT 130 MM HG: CPT | Performed by: FAMILY MEDICINE

## 2020-04-20 PROCEDURE — 3078F DIAST BP <80 MM HG: CPT | Performed by: FAMILY MEDICINE

## 2020-04-20 PROCEDURE — 3066F NEPHROPATHY DOC TX: CPT | Performed by: FAMILY MEDICINE

## 2020-04-20 PROCEDURE — 3044F HG A1C LEVEL LT 7.0%: CPT | Performed by: INTERNAL MEDICINE

## 2020-04-20 PROCEDURE — 3008F BODY MASS INDEX DOCD: CPT | Performed by: FAMILY MEDICINE

## 2020-04-20 PROCEDURE — 4010F ACE/ARB THERAPY RXD/TAKEN: CPT | Performed by: FAMILY MEDICINE

## 2020-04-20 PROCEDURE — 1036F TOBACCO NON-USER: CPT | Performed by: FAMILY MEDICINE

## 2020-04-20 PROCEDURE — 99214 OFFICE O/P EST MOD 30 MIN: CPT | Performed by: FAMILY MEDICINE

## 2020-04-20 RX ORDER — EZETIMIBE 10 MG/1
10 TABLET ORAL DAILY
Qty: 30 TABLET | Refills: 5 | Status: SHIPPED | OUTPATIENT
Start: 2020-04-20 | End: 2020-06-25 | Stop reason: SDUPTHER

## 2020-04-20 RX ORDER — NIACIN 1000 MG/1
1000 TABLET, EXTENDED RELEASE ORAL
Qty: 30 TABLET | Refills: 5 | Status: SHIPPED | OUTPATIENT
Start: 2020-04-20 | End: 2020-06-25 | Stop reason: SDUPTHER

## 2020-04-20 RX ORDER — LOSARTAN POTASSIUM 25 MG/1
12.5 TABLET ORAL DAILY
Qty: 30 TABLET | Refills: 5 | Status: SHIPPED | OUTPATIENT
Start: 2020-04-20 | End: 2020-06-25 | Stop reason: SDUPTHER

## 2020-04-20 RX ORDER — LORATADINE 10 MG/1
10 TABLET ORAL DAILY
Qty: 90 TABLET | Refills: 3 | Status: SHIPPED | OUTPATIENT
Start: 2020-04-20 | End: 2020-06-25 | Stop reason: SDUPTHER

## 2020-04-20 RX ORDER — METOPROLOL TARTRATE 50 MG/1
50 TABLET, FILM COATED ORAL EVERY 12 HOURS SCHEDULED
Qty: 60 TABLET | Refills: 5 | Status: SHIPPED | OUTPATIENT
Start: 2020-04-20 | End: 2020-06-25 | Stop reason: SDUPTHER

## 2020-04-20 RX ORDER — ROSUVASTATIN CALCIUM 40 MG/1
40 TABLET, COATED ORAL DAILY
Qty: 30 TABLET | Refills: 5 | Status: SHIPPED | OUTPATIENT
Start: 2020-04-20 | End: 2020-06-25 | Stop reason: SDUPTHER

## 2020-04-20 RX ORDER — TADALAFIL 10 MG/1
10 TABLET ORAL DAILY PRN
Qty: 10 TABLET | Refills: 3 | Status: SHIPPED | OUTPATIENT
Start: 2020-04-20 | End: 2021-01-20

## 2020-04-22 ENCOUNTER — APPOINTMENT (OUTPATIENT)
Dept: LAB | Age: 60
End: 2020-04-22

## 2020-04-22 DIAGNOSIS — E78.2 MIXED HYPERLIPIDEMIA: ICD-10-CM

## 2020-04-22 DIAGNOSIS — I10 ESSENTIAL HYPERTENSION: ICD-10-CM

## 2020-04-22 DIAGNOSIS — E11.00 TYPE 2 DIABETES MELLITUS WITH HYPEROSMOLARITY WITHOUT COMA, WITHOUT LONG-TERM CURRENT USE OF INSULIN (HCC): ICD-10-CM

## 2020-04-22 DIAGNOSIS — I25.10 CORONARY ARTERY DISEASE INVOLVING NATIVE CORONARY ARTERY OF NATIVE HEART WITHOUT ANGINA PECTORIS: ICD-10-CM

## 2020-04-22 DIAGNOSIS — I21.4 NSTEMI (NON-ST ELEVATED MYOCARDIAL INFARCTION) (HCC): ICD-10-CM

## 2020-04-22 DIAGNOSIS — N52.01 ERECTILE DYSFUNCTION DUE TO ARTERIAL INSUFFICIENCY: ICD-10-CM

## 2020-04-22 LAB
ALBUMIN SERPL BCP-MCNC: 3.9 G/DL (ref 3.5–5)
ALP SERPL-CCNC: 57 U/L (ref 46–116)
ALT SERPL W P-5'-P-CCNC: 38 U/L (ref 12–78)
ANION GAP SERPL CALCULATED.3IONS-SCNC: 5 MMOL/L (ref 4–13)
AST SERPL W P-5'-P-CCNC: 18 U/L (ref 5–45)
BASOPHILS # BLD AUTO: 0.08 THOUSANDS/ΜL (ref 0–0.1)
BASOPHILS NFR BLD AUTO: 1 % (ref 0–1)
BILIRUB SERPL-MCNC: 0.85 MG/DL (ref 0.2–1)
BUN SERPL-MCNC: 11 MG/DL (ref 5–25)
CALCIUM SERPL-MCNC: 9.2 MG/DL (ref 8.3–10.1)
CHLORIDE SERPL-SCNC: 109 MMOL/L (ref 100–108)
CHOLEST SERPL-MCNC: 122 MG/DL (ref 50–200)
CO2 SERPL-SCNC: 30 MMOL/L (ref 21–32)
CREAT SERPL-MCNC: 0.86 MG/DL (ref 0.6–1.3)
CREAT UR-MCNC: 233 MG/DL
EOSINOPHIL # BLD AUTO: 0.33 THOUSAND/ΜL (ref 0–0.61)
EOSINOPHIL NFR BLD AUTO: 5 % (ref 0–6)
ERYTHROCYTE [DISTWIDTH] IN BLOOD BY AUTOMATED COUNT: 13.3 % (ref 11.6–15.1)
GFR SERPL CREATININE-BSD FRML MDRD: 94 ML/MIN/1.73SQ M
GLUCOSE P FAST SERPL-MCNC: 109 MG/DL (ref 65–99)
HCT VFR BLD AUTO: 39.5 % (ref 36.5–49.3)
HDLC SERPL-MCNC: 52 MG/DL
HGB BLD-MCNC: 12.5 G/DL (ref 12–17)
IMM GRANULOCYTES # BLD AUTO: 0.01 THOUSAND/UL (ref 0–0.2)
IMM GRANULOCYTES NFR BLD AUTO: 0 % (ref 0–2)
LDLC SERPL CALC-MCNC: 53 MG/DL (ref 0–100)
LYMPHOCYTES # BLD AUTO: 2.46 THOUSANDS/ΜL (ref 0.6–4.47)
LYMPHOCYTES NFR BLD AUTO: 37 % (ref 14–44)
MCH RBC QN AUTO: 31.3 PG (ref 26.8–34.3)
MCHC RBC AUTO-ENTMCNC: 31.6 G/DL (ref 31.4–37.4)
MCV RBC AUTO: 99 FL (ref 82–98)
MICROALBUMIN UR-MCNC: 23.2 MG/L (ref 0–20)
MICROALBUMIN/CREAT 24H UR: 10 MG/G CREATININE (ref 0–30)
MONOCYTES # BLD AUTO: 0.54 THOUSAND/ΜL (ref 0.17–1.22)
MONOCYTES NFR BLD AUTO: 8 % (ref 4–12)
NEUTROPHILS # BLD AUTO: 3.18 THOUSANDS/ΜL (ref 1.85–7.62)
NEUTS SEG NFR BLD AUTO: 49 % (ref 43–75)
NONHDLC SERPL-MCNC: 70 MG/DL
NRBC BLD AUTO-RTO: 0 /100 WBCS
PLATELET # BLD AUTO: 216 THOUSANDS/UL (ref 149–390)
PMV BLD AUTO: 9.9 FL (ref 8.9–12.7)
POTASSIUM SERPL-SCNC: 4.2 MMOL/L (ref 3.5–5.3)
PROT SERPL-MCNC: 7.6 G/DL (ref 6.4–8.2)
RBC # BLD AUTO: 3.99 MILLION/UL (ref 3.88–5.62)
SODIUM SERPL-SCNC: 144 MMOL/L (ref 136–145)
TRIGL SERPL-MCNC: 86 MG/DL
TSH SERPL DL<=0.05 MIU/L-ACNC: 2.4 UIU/ML (ref 0.36–3.74)
WBC # BLD AUTO: 6.6 THOUSAND/UL (ref 4.31–10.16)

## 2020-04-22 PROCEDURE — 80061 LIPID PANEL: CPT

## 2020-04-22 PROCEDURE — 36415 COLL VENOUS BLD VENIPUNCTURE: CPT

## 2020-04-22 PROCEDURE — 82570 ASSAY OF URINE CREATININE: CPT | Performed by: FAMILY MEDICINE

## 2020-04-22 PROCEDURE — 3061F NEG MICROALBUMINURIA REV: CPT | Performed by: INTERNAL MEDICINE

## 2020-04-22 PROCEDURE — 82043 UR ALBUMIN QUANTITATIVE: CPT | Performed by: FAMILY MEDICINE

## 2020-04-22 PROCEDURE — 80053 COMPREHEN METABOLIC PANEL: CPT

## 2020-04-22 PROCEDURE — 85025 COMPLETE CBC W/AUTO DIFF WBC: CPT

## 2020-04-22 PROCEDURE — 84443 ASSAY THYROID STIM HORMONE: CPT

## 2020-05-18 ENCOUNTER — HOSPITAL ENCOUNTER (OUTPATIENT)
Dept: RADIOLOGY | Facility: IMAGING CENTER | Age: 60
Discharge: HOME | End: 2020-05-18
Payer: COMMERCIAL

## 2020-05-18 DIAGNOSIS — Z12.2 ENCOUNTER FOR SCREENING FOR LUNG CANCER: ICD-10-CM

## 2020-05-18 PROCEDURE — G0297 LDCT FOR LUNG CA SCREEN: HCPCS

## 2020-06-25 ENCOUNTER — OFFICE VISIT (OUTPATIENT)
Dept: CARDIOLOGY CLINIC | Facility: CLINIC | Age: 60
End: 2020-06-25
Payer: COMMERCIAL

## 2020-06-25 ENCOUNTER — OFFICE VISIT (OUTPATIENT)
Dept: FAMILY MEDICINE CLINIC | Facility: CLINIC | Age: 60
End: 2020-06-25
Payer: COMMERCIAL

## 2020-06-25 VITALS
DIASTOLIC BLOOD PRESSURE: 78 MMHG | BODY MASS INDEX: 25.71 KG/M2 | HEIGHT: 73 IN | HEART RATE: 60 BPM | SYSTOLIC BLOOD PRESSURE: 128 MMHG | WEIGHT: 194 LBS | TEMPERATURE: 96.9 F

## 2020-06-25 VITALS
DIASTOLIC BLOOD PRESSURE: 74 MMHG | BODY MASS INDEX: 25.31 KG/M2 | WEIGHT: 191 LBS | SYSTOLIC BLOOD PRESSURE: 126 MMHG | HEIGHT: 73 IN | TEMPERATURE: 96.8 F

## 2020-06-25 DIAGNOSIS — E11.9 TYPE 2 DIABETES MELLITUS WITHOUT COMPLICATION, UNSPECIFIED WHETHER LONG TERM INSULIN USE (HCC): ICD-10-CM

## 2020-06-25 DIAGNOSIS — I25.10 CORONARY ARTERY DISEASE INVOLVING NATIVE CORONARY ARTERY OF NATIVE HEART WITHOUT ANGINA PECTORIS: ICD-10-CM

## 2020-06-25 DIAGNOSIS — I21.4 NSTEMI (NON-ST ELEVATED MYOCARDIAL INFARCTION) (HCC): ICD-10-CM

## 2020-06-25 DIAGNOSIS — I10 ESSENTIAL HYPERTENSION: ICD-10-CM

## 2020-06-25 DIAGNOSIS — J40 BRONCHITIS: ICD-10-CM

## 2020-06-25 DIAGNOSIS — I25.10 CORONARY ARTERY DISEASE INVOLVING NATIVE CORONARY ARTERY OF NATIVE HEART WITHOUT ANGINA PECTORIS: Primary | ICD-10-CM

## 2020-06-25 DIAGNOSIS — Z88.9 H/O SEASONAL ALLERGIES: ICD-10-CM

## 2020-06-25 DIAGNOSIS — Z95.5 S/P CORONARY ARTERY STENT PLACEMENT: ICD-10-CM

## 2020-06-25 DIAGNOSIS — I21.4 NSTEMI (NON-ST ELEVATED MYOCARDIAL INFARCTION) (HCC): Primary | ICD-10-CM

## 2020-06-25 DIAGNOSIS — E78.2 MIXED HYPERLIPIDEMIA: ICD-10-CM

## 2020-06-25 PROCEDURE — 3074F SYST BP LT 130 MM HG: CPT | Performed by: FAMILY MEDICINE

## 2020-06-25 PROCEDURE — 3066F NEPHROPATHY DOC TX: CPT | Performed by: INTERNAL MEDICINE

## 2020-06-25 PROCEDURE — 3078F DIAST BP <80 MM HG: CPT | Performed by: INTERNAL MEDICINE

## 2020-06-25 PROCEDURE — 99214 OFFICE O/P EST MOD 30 MIN: CPT | Performed by: INTERNAL MEDICINE

## 2020-06-25 PROCEDURE — 3044F HG A1C LEVEL LT 7.0%: CPT | Performed by: FAMILY MEDICINE

## 2020-06-25 PROCEDURE — 99214 OFFICE O/P EST MOD 30 MIN: CPT | Performed by: FAMILY MEDICINE

## 2020-06-25 PROCEDURE — 3078F DIAST BP <80 MM HG: CPT | Performed by: FAMILY MEDICINE

## 2020-06-25 PROCEDURE — 3074F SYST BP LT 130 MM HG: CPT | Performed by: INTERNAL MEDICINE

## 2020-06-25 PROCEDURE — 3008F BODY MASS INDEX DOCD: CPT | Performed by: FAMILY MEDICINE

## 2020-06-25 PROCEDURE — 4010F ACE/ARB THERAPY RXD/TAKEN: CPT | Performed by: FAMILY MEDICINE

## 2020-06-25 PROCEDURE — 3066F NEPHROPATHY DOC TX: CPT | Performed by: FAMILY MEDICINE

## 2020-06-25 PROCEDURE — 3044F HG A1C LEVEL LT 7.0%: CPT | Performed by: INTERNAL MEDICINE

## 2020-06-25 PROCEDURE — 3060F POS MICROALBUMINURIA REV: CPT | Performed by: INTERNAL MEDICINE

## 2020-06-25 PROCEDURE — 3060F POS MICROALBUMINURIA REV: CPT | Performed by: FAMILY MEDICINE

## 2020-06-25 PROCEDURE — 1036F TOBACCO NON-USER: CPT | Performed by: FAMILY MEDICINE

## 2020-06-25 PROCEDURE — 1036F TOBACCO NON-USER: CPT | Performed by: INTERNAL MEDICINE

## 2020-06-25 RX ORDER — LORATADINE 10 MG/1
10 TABLET ORAL DAILY
Qty: 90 TABLET | Refills: 3 | Status: SHIPPED | OUTPATIENT
Start: 2020-06-25 | End: 2021-06-30 | Stop reason: SDUPTHER

## 2020-06-25 RX ORDER — METOPROLOL TARTRATE 50 MG/1
50 TABLET, FILM COATED ORAL EVERY 12 HOURS SCHEDULED
Qty: 180 TABLET | Refills: 1 | Status: SHIPPED | OUTPATIENT
Start: 2020-06-25 | End: 2020-09-03 | Stop reason: SDUPTHER

## 2020-06-25 RX ORDER — ROSUVASTATIN CALCIUM 40 MG/1
40 TABLET, COATED ORAL DAILY
Qty: 90 TABLET | Refills: 1 | Status: SHIPPED | OUTPATIENT
Start: 2020-06-25 | End: 2020-09-03 | Stop reason: SDUPTHER

## 2020-06-25 RX ORDER — EZETIMIBE 10 MG/1
10 TABLET ORAL DAILY
Qty: 90 TABLET | Refills: 1 | Status: SHIPPED | OUTPATIENT
Start: 2020-06-25 | End: 2020-09-03 | Stop reason: SDUPTHER

## 2020-06-25 RX ORDER — NIACIN 1000 MG/1
1000 TABLET, EXTENDED RELEASE ORAL
Qty: 90 TABLET | Refills: 1 | Status: SHIPPED | OUTPATIENT
Start: 2020-06-25 | End: 2020-12-18 | Stop reason: SDUPTHER

## 2020-06-25 RX ORDER — LOSARTAN POTASSIUM 25 MG/1
12.5 TABLET ORAL DAILY
Qty: 90 TABLET | Refills: 1 | Status: SHIPPED | OUTPATIENT
Start: 2020-06-25 | End: 2020-09-03 | Stop reason: SDUPTHER

## 2020-08-19 DIAGNOSIS — N52.01 ERECTILE DYSFUNCTION DUE TO ARTERIAL INSUFFICIENCY: Primary | ICD-10-CM

## 2020-08-19 RX ORDER — SILDENAFIL 50 MG/1
100 TABLET, FILM COATED ORAL DAILY PRN
Qty: 10 TABLET | Refills: 0 | Status: SHIPPED | OUTPATIENT
Start: 2020-08-19 | End: 2020-09-03 | Stop reason: SDUPTHER

## 2020-09-03 ENCOUNTER — TELEMEDICINE (OUTPATIENT)
Dept: FAMILY MEDICINE CLINIC | Facility: CLINIC | Age: 60
End: 2020-09-03
Payer: COMMERCIAL

## 2020-09-03 VITALS — WEIGHT: 186 LBS | HEIGHT: 73 IN | TEMPERATURE: 98.6 F | BODY MASS INDEX: 24.65 KG/M2

## 2020-09-03 DIAGNOSIS — N52.01 ERECTILE DYSFUNCTION DUE TO ARTERIAL INSUFFICIENCY: ICD-10-CM

## 2020-09-03 DIAGNOSIS — J40 BRONCHITIS: ICD-10-CM

## 2020-09-03 DIAGNOSIS — H65.06 RECURRENT ACUTE SEROUS OTITIS MEDIA OF BOTH EARS: Primary | ICD-10-CM

## 2020-09-03 DIAGNOSIS — E11.9 TYPE 2 DIABETES MELLITUS WITHOUT COMPLICATION, UNSPECIFIED WHETHER LONG TERM INSULIN USE (HCC): ICD-10-CM

## 2020-09-03 DIAGNOSIS — Z95.5 S/P CORONARY ARTERY STENT PLACEMENT: ICD-10-CM

## 2020-09-03 DIAGNOSIS — I21.4 NSTEMI (NON-ST ELEVATED MYOCARDIAL INFARCTION) (HCC): ICD-10-CM

## 2020-09-03 PROCEDURE — 99213 OFFICE O/P EST LOW 20 MIN: CPT | Performed by: FAMILY MEDICINE

## 2020-09-03 RX ORDER — LEVOFLOXACIN 500 MG/1
500 TABLET, FILM COATED ORAL EVERY 24 HOURS
Qty: 7 TABLET | Refills: 0 | Status: SHIPPED | OUTPATIENT
Start: 2020-09-03 | End: 2020-09-10

## 2020-09-03 RX ORDER — SILDENAFIL 50 MG/1
100 TABLET, FILM COATED ORAL DAILY PRN
Qty: 90 TABLET | Refills: 0 | Status: SHIPPED | OUTPATIENT
Start: 2020-09-03 | End: 2020-11-12 | Stop reason: SDUPTHER

## 2020-09-03 RX ORDER — METOPROLOL TARTRATE 50 MG/1
50 TABLET, FILM COATED ORAL EVERY 12 HOURS SCHEDULED
Qty: 180 TABLET | Refills: 1 | Status: SHIPPED | OUTPATIENT
Start: 2020-09-03 | End: 2020-09-24 | Stop reason: SDUPTHER

## 2020-09-03 RX ORDER — ROSUVASTATIN CALCIUM 40 MG/1
40 TABLET, COATED ORAL DAILY
Qty: 90 TABLET | Refills: 1 | Status: SHIPPED | OUTPATIENT
Start: 2020-09-03 | End: 2020-09-24 | Stop reason: SDUPTHER

## 2020-09-03 RX ORDER — LOSARTAN POTASSIUM 25 MG/1
12.5 TABLET ORAL DAILY
Qty: 90 TABLET | Refills: 1 | Status: SHIPPED | OUTPATIENT
Start: 2020-09-03 | End: 2020-09-24 | Stop reason: SDUPTHER

## 2020-09-03 RX ORDER — EZETIMIBE 10 MG/1
10 TABLET ORAL DAILY
Qty: 90 TABLET | Refills: 1 | Status: SHIPPED | OUTPATIENT
Start: 2020-09-03 | End: 2020-12-18 | Stop reason: SDUPTHER

## 2020-09-03 NOTE — PROGRESS NOTES
Virtual Regular Visit      Assessment/Plan:    Problem List Items Addressed This Visit        Endocrine    Type 2 diabetes mellitus (HCC)    Relevant Medications    ezetimibe (ZETIA) 10 mg tablet    metFORMIN (GLUCOPHAGE) 1000 MG tablet    rosuvastatin (CRESTOR) 40 MG tablet       Respiratory    Bronchitis    Relevant Medications    metFORMIN (GLUCOPHAGE) 1000 MG tablet       Cardiovascular and Mediastinum    NSTEMI (non-ST elevated myocardial infarction) (HCC)    Relevant Medications    metoprolol tartrate (LOPRESSOR) 50 mg tablet    sildenafil (VIAGRA) 50 MG tablet    ticagrelor (BRILINTA) 90 MG       Nervous and Auditory    Recurrent acute serous otitis media of both ears - Primary    Relevant Medications    levofloxacin (LEVAQUIN) 500 mg tablet       Other    Erectile dysfunction    Relevant Medications    sildenafil (VIAGRA) 50 MG tablet      Other Visit Diagnoses     S/P coronary artery stent placement        Relevant Medications    losartan (COZAAR) 25 mg tablet        Guidance given overall  Patient use start Levaquin as directed  Patient will have refills for chronic conditions listed  Patient will follow-up in the office for Pneumovax 23  Patient will otherwise follow-up in 3 months       Reason for visit is   Chief Complaint   Patient presents with    Sore Throat     Patient has had sore throat for the past 3 days  Patient has not been using anything for this  Patient denies any other sxs   Medication Refill     Please send to [facility] Virtual Regular Visit        Encounter provider Giselle Jimenez DO    Provider located at [address] [place], [place] [id number]      Recent Visits  No visits were found meeting these conditions     Showing recent visits within past 7 days and meeting all other requirements     Today's Visits  Date Type Provider Dept   09/03/20 3839 Warern Ashley DO [address] Showing today's visits and meeting all other requirements     Future Appointments  No visits were found meeting these conditions  Showing future appointments within next 150 days and meeting all other requirements        The patient was identified by name and date of birth  Renae Lieberman was informed that this is a telemedicine visit and that the visit is being conducted through [email or web address]  My office door was closed  No one else was in the room  He acknowledged consent and understanding of privacy and security of the video platform  The patient has agreed to participate and understands they can discontinue the visit at any time  Patient is aware this is a billable service  Subjective  Renae Lieberman is a 63 y o  male as below   Patient needs refills for chronic conditions listed  Patient also with some ear discomfort over the past few days  No fevers  No other significant symptoms    Patient would like to get pneumonia vaccine in the near future       Past Medical History:   Diagnosis Date    Asthma     Coronary artery disease     Diabetes ([facility] [address] )     HTN (hypertension)     Hypercholesteremia     Myocardial infarction [facility])        Past Surgical History:   Procedure Laterality Date    CARDIAC CATHETERIZATION      COLONOSCOPY      RESOLVED: 2003    CORONARY STENT PLACEMENT      MASS EXCISION Right     post auricular cyst removal - in office - Dr Pedro Sanches - 6/10/19    TONSILLECTOMY         Current Outpatient Medications   Medication Sig Dispense Refill    aspirin (ECOTRIN LOW STRENGTH) 81 mg EC tablet Take 1 tablet (81 mg total) by mouth daily 30 tablet 0    ezetimibe (ZETIA) 10 mg tablet Take 1 tablet (10 mg total) by mouth daily 90 tablet 1    losartan (COZAAR) 25 mg tablet Take 0 5 tablets (12 5 mg total) by mouth daily 90 tablet 1    metFORMIN (GLUCOPHAGE) 1000 MG tablet Take 1 tablet (1,000 mg total) by mouth 2 (two) times a day 180 tablet 1    metoprolol tartrate (LOPRESSOR) 50 mg tablet Take 1 tablet (50 mg total) by mouth every 12 (twelve) hours 180 tablet 1    multivitamin (THERAGRAN) TABS Take 1 tablet by mouth daily      niacin (NIASPAN) 1000 MG CR tablet Take 1 tablet (1,000 mg total) by mouth daily at bedtime 90 tablet 1    Omega-3 Fatty Acids (FISH OIL) 1200 MG CAPS Take by mouth      rosuvastatin (CRESTOR) 40 MG tablet Take 1 tablet (40 mg total) by mouth daily 90 tablet 1    sildenafil (VIAGRA) 50 MG tablet Take 2 tablets (100 mg total) by mouth daily as needed for erectile dysfunction 90 tablet 0    ticagrelor (BRILINTA) 90 MG Take 1 tablet (90 mg total) by mouth every 12 (twelve) hours 180 tablet 1    levofloxacin (LEVAQUIN) 500 mg tablet Take 1 tablet (500 mg total) by mouth every 24 hours for 7 days 7 tablet 0    loratadine (CLARITIN) 10 mg tablet Take 1 tablet (10 mg total) by mouth daily (Patient not taking: Reported on 6/25/2020) 90 tablet 3    nitroglycerin (NITROSTAT) 0 4 mg SL tablet Place 1 tablet (0 4 mg total) under the tongue every 5 (five) minutes as needed for chest pain (Patient not taking: Reported on 3/18/2020) 30 tablet 0    tadalafil (CIALIS) 10 MG tablet Take 1 tablet (10 mg total) by mouth daily as needed for erectile dysfunction (Patient not taking: Reported on 6/25/2020) 10 tablet 3     No current facility-administered medications for this visit  Allergies   Allergen Reactions    Penicillins Throat Swelling    Shellfish Allergy Throat Swelling       Review of Systems   Constitutional: Negative  HENT: Positive for ear pain  Eyes: Negative  Respiratory: Negative  Cardiovascular: Negative  Gastrointestinal: Negative  Endocrine: Negative  Genitourinary: Negative  Musculoskeletal: Negative  Skin: Negative  Allergic/Immunologic: Negative  Neurological: Negative  Hematological: Negative  Psychiatric/Behavioral: Negative          Video Exam    Vitals:    09/03/20 0915   Temp: 98 6 °F (37 °C) TempSrc: Oral   Weight: 84 4 kg (186 lb)   Height: 6' 1" (1 854 m)       Physical Exam  Neurological:      Mental Status: He is alert  Psychiatric:         Mood and Affect: Mood normal           I spent 15 minutes directly with the patient during this visit      VIRTUAL VISIT C[address]ya 52 acknowledges that he has consented to an online visit or consultation  He understands that the online visit is based solely on information provided by him, and that, in the absence of a face-to-face physical evaluation by the physician, the diagnosis he receives is both limited and provisional in terms of accuracy and completeness  This is not intended to replace a full medical face-to-face evaluation by the physician  Sixto Meneses understands and accepts these terms

## 2020-09-04 ENCOUNTER — CLINICAL SUPPORT (OUTPATIENT)
Dept: FAMILY MEDICINE CLINIC | Facility: CLINIC | Age: 60
End: 2020-09-04
Payer: COMMERCIAL

## 2020-09-04 DIAGNOSIS — Z23 ENCOUNTER FOR IMMUNIZATION: Primary | ICD-10-CM

## 2020-09-04 PROCEDURE — 90471 IMMUNIZATION ADMIN: CPT | Performed by: FAMILY MEDICINE

## 2020-09-04 PROCEDURE — 90732 PPSV23 VACC 2 YRS+ SUBQ/IM: CPT | Performed by: FAMILY MEDICINE

## 2020-09-18 DIAGNOSIS — E11.9 TYPE 2 DIABETES MELLITUS WITHOUT COMPLICATION, UNSPECIFIED WHETHER LONG TERM INSULIN USE (HCC): Primary | ICD-10-CM

## 2020-09-24 ENCOUNTER — OFFICE VISIT (OUTPATIENT)
Dept: FAMILY MEDICINE CLINIC | Facility: CLINIC | Age: 60
End: 2020-09-24
Payer: COMMERCIAL

## 2020-09-24 VITALS
DIASTOLIC BLOOD PRESSURE: 78 MMHG | WEIGHT: 192 LBS | BODY MASS INDEX: 25.45 KG/M2 | HEIGHT: 73 IN | SYSTOLIC BLOOD PRESSURE: 122 MMHG

## 2020-09-24 DIAGNOSIS — J40 BRONCHITIS: ICD-10-CM

## 2020-09-24 DIAGNOSIS — I10 ESSENTIAL HYPERTENSION: ICD-10-CM

## 2020-09-24 DIAGNOSIS — E78.2 MIXED HYPERLIPIDEMIA: ICD-10-CM

## 2020-09-24 DIAGNOSIS — Z23 NEED FOR IMMUNIZATION AGAINST INFLUENZA: ICD-10-CM

## 2020-09-24 DIAGNOSIS — N52.01 ERECTILE DYSFUNCTION DUE TO ARTERIAL INSUFFICIENCY: ICD-10-CM

## 2020-09-24 DIAGNOSIS — R80.9 MICROALBUMINURIA: Primary | ICD-10-CM

## 2020-09-24 DIAGNOSIS — Z95.5 S/P CORONARY ARTERY STENT PLACEMENT: ICD-10-CM

## 2020-09-24 DIAGNOSIS — E11.9 TYPE 2 DIABETES MELLITUS WITHOUT COMPLICATION, UNSPECIFIED WHETHER LONG TERM INSULIN USE (HCC): ICD-10-CM

## 2020-09-24 DIAGNOSIS — I21.4 NSTEMI (NON-ST ELEVATED MYOCARDIAL INFARCTION) (HCC): ICD-10-CM

## 2020-09-24 LAB — SL AMB POCT HEMOGLOBIN AIC: 5.5 (ref ?–6.5)

## 2020-09-24 PROCEDURE — 83036 HEMOGLOBIN GLYCOSYLATED A1C: CPT | Performed by: FAMILY MEDICINE

## 2020-09-24 PROCEDURE — 90682 RIV4 VACC RECOMBINANT DNA IM: CPT

## 2020-09-24 PROCEDURE — 3074F SYST BP LT 130 MM HG: CPT | Performed by: FAMILY MEDICINE

## 2020-09-24 PROCEDURE — 90471 IMMUNIZATION ADMIN: CPT

## 2020-09-24 PROCEDURE — 3078F DIAST BP <80 MM HG: CPT | Performed by: FAMILY MEDICINE

## 2020-09-24 PROCEDURE — 99214 OFFICE O/P EST MOD 30 MIN: CPT | Performed by: FAMILY MEDICINE

## 2020-09-24 PROCEDURE — 3044F HG A1C LEVEL LT 7.0%: CPT | Performed by: FAMILY MEDICINE

## 2020-09-24 PROCEDURE — 4010F ACE/ARB THERAPY RXD/TAKEN: CPT | Performed by: FAMILY MEDICINE

## 2020-09-24 PROCEDURE — 3066F NEPHROPATHY DOC TX: CPT | Performed by: FAMILY MEDICINE

## 2020-09-24 RX ORDER — TADALAFIL 10 MG/1
10 TABLET ORAL DAILY PRN
Qty: 10 TABLET | Refills: 3 | Status: CANCELLED | OUTPATIENT
Start: 2020-09-24

## 2020-09-24 RX ORDER — LOSARTAN POTASSIUM 25 MG/1
12.5 TABLET ORAL DAILY
Qty: 90 TABLET | Refills: 1 | Status: SHIPPED | OUTPATIENT
Start: 2020-09-24 | End: 2020-12-18 | Stop reason: SDUPTHER

## 2020-09-24 RX ORDER — METOPROLOL TARTRATE 50 MG/1
50 TABLET, FILM COATED ORAL EVERY 12 HOURS SCHEDULED
Qty: 180 TABLET | Refills: 1 | Status: SHIPPED | OUTPATIENT
Start: 2020-09-24 | End: 2020-11-12 | Stop reason: SDUPTHER

## 2020-09-24 RX ORDER — SILDENAFIL 50 MG/1
100 TABLET, FILM COATED ORAL DAILY PRN
Qty: 90 TABLET | Refills: 0 | Status: CANCELLED | OUTPATIENT
Start: 2020-09-24

## 2020-09-24 RX ORDER — ROSUVASTATIN CALCIUM 40 MG/1
40 TABLET, COATED ORAL DAILY
Qty: 90 TABLET | Refills: 1 | Status: SHIPPED | OUTPATIENT
Start: 2020-09-24 | End: 2020-12-18 | Stop reason: SDUPTHER

## 2020-09-24 NOTE — PROGRESS NOTES
Assessment/Plan:  A1c is 5 5  LDL 53  Blood pressure stable  Labs reviewed  Your refills given at this time  Meds reviewed  Diagnoses and all orders for this visit:    Microalbuminuria    Type 2 diabetes mellitus without complication, unspecified whether long term insulin use (HCC)  -     metFORMIN (GLUCOPHAGE) 1000 MG tablet; Take 1 tablet (1,000 mg total) by mouth 2 (two) times a day  -     rosuvastatin (CRESTOR) 40 MG tablet; Take 1 tablet (40 mg total) by mouth daily  -     POCT hemoglobin A1c    Bronchitis  -     metFORMIN (GLUCOPHAGE) 1000 MG tablet; Take 1 tablet (1,000 mg total) by mouth 2 (two) times a day    Erectile dysfunction due to arterial insufficiency    Essential hypertension    NSTEMI (non-ST elevated myocardial infarction) ([facility])  -     metoprolol tartrate (LOPRESSOR) 50 mg tablet; Take 1 tablet (50 mg total) by mouth every 12 (twelve) hours  -     ticagrelor (BRILINTA) 90 MG; Take 1 tablet (90 mg total) by mouth every 12 (twelve) hours    S/P coronary artery stent placement  -     losartan (COZAAR) 25 mg tablet; Take 0 5 tablets (12 5 mg total) by mouth daily    Mixed hyperlipidemia    Other orders  -     Cancel: sildenafil (VIAGRA) 50 MG tablet; Take 2 tablets (100 mg total) by mouth daily as needed for erectile dysfunction  -     Cancel: tadalafil (CIALIS) 10 MG tablet; Take 1 tablet (10 mg total) by mouth daily as needed for erectile dysfunction            Subjective:        Patient ID: Lana Reyes is a 63 y o  male  Patient follow-up on diabetes hypertension hyperlipidemia erectile dysfunction etcetera  Patient doing fairly well this time  A1c is 5 5  no significant chest pain or shortness of breath with ambulation/walking  Patient has been trying to walk more frequently and watch diet  No significant change in urination or defecation  Patient does notice some eye strain with computer work and telephone  Work  Patient otherwise feeling well          The following portions of the patient's history were reviewed and updated as appropriate: allergies, current medications, past family history, past medical history, past social history, past surgical history and problem list       Review of Systems   Constitutional: Negative  HENT: Negative  Eyes: Negative  Respiratory: Negative  Cardiovascular: Negative  Gastrointestinal: Negative  Endocrine: Negative  Genitourinary: Negative  Musculoskeletal: Negative  Skin: Negative  Allergic/Immunologic: Negative  Neurological: Negative  Hematological: Negative  Psychiatric/Behavioral: Negative              Objective:               /78 (BP Location: Right arm, Patient Position: Sitting, Cuff Size: Standard)   Ht 6' 1" (1 854 m)   Wt 87 1 kg (192 lb)   BMI 25 33 kg/m²          Physical Exam

## 2020-10-07 ENCOUNTER — TELEMEDICINE (OUTPATIENT)
Dept: FAMILY MEDICINE CLINIC | Facility: CLINIC | Age: 60
End: 2020-10-07
Payer: COMMERCIAL

## 2020-10-07 VITALS — BODY MASS INDEX: 23.35 KG/M2 | WEIGHT: 177 LBS

## 2020-10-07 DIAGNOSIS — U07.1 COVID-19: Primary | ICD-10-CM

## 2020-10-07 PROCEDURE — 99213 OFFICE O/P EST LOW 20 MIN: CPT | Performed by: FAMILY MEDICINE

## 2020-10-07 PROCEDURE — 1036F TOBACCO NON-USER: CPT | Performed by: FAMILY MEDICINE

## 2020-11-12 DIAGNOSIS — I21.4 NSTEMI (NON-ST ELEVATED MYOCARDIAL INFARCTION) (HCC): ICD-10-CM

## 2020-11-12 DIAGNOSIS — N52.01 ERECTILE DYSFUNCTION DUE TO ARTERIAL INSUFFICIENCY: ICD-10-CM

## 2020-11-12 RX ORDER — SILDENAFIL 50 MG/1
100 TABLET, FILM COATED ORAL DAILY PRN
Qty: 90 TABLET | Refills: 0 | Status: SHIPPED | OUTPATIENT
Start: 2020-11-12 | End: 2020-12-18 | Stop reason: SDUPTHER

## 2020-11-12 RX ORDER — METOPROLOL TARTRATE 50 MG/1
50 TABLET, FILM COATED ORAL EVERY 12 HOURS SCHEDULED
Qty: 180 TABLET | Refills: 1 | Status: SHIPPED | OUTPATIENT
Start: 2020-11-12 | End: 2020-12-18 | Stop reason: SDUPTHER

## 2020-12-18 ENCOUNTER — OFFICE VISIT (OUTPATIENT)
Dept: FAMILY MEDICINE CLINIC | Facility: CLINIC | Age: 60
End: 2020-12-18
Payer: COMMERCIAL

## 2020-12-18 VITALS
DIASTOLIC BLOOD PRESSURE: 80 MMHG | TEMPERATURE: 96.1 F | SYSTOLIC BLOOD PRESSURE: 120 MMHG | BODY MASS INDEX: 25.18 KG/M2 | HEIGHT: 73 IN | WEIGHT: 190 LBS

## 2020-12-18 DIAGNOSIS — J40 BRONCHITIS: ICD-10-CM

## 2020-12-18 DIAGNOSIS — I25.10 CORONARY ARTERY DISEASE INVOLVING NATIVE CORONARY ARTERY OF NATIVE HEART WITHOUT ANGINA PECTORIS: Primary | ICD-10-CM

## 2020-12-18 DIAGNOSIS — I10 ESSENTIAL HYPERTENSION: ICD-10-CM

## 2020-12-18 DIAGNOSIS — Z95.5 S/P CORONARY ARTERY STENT PLACEMENT: ICD-10-CM

## 2020-12-18 DIAGNOSIS — J01.10 ACUTE NON-RECURRENT FRONTAL SINUSITIS: ICD-10-CM

## 2020-12-18 DIAGNOSIS — N52.01 ERECTILE DYSFUNCTION DUE TO ARTERIAL INSUFFICIENCY: ICD-10-CM

## 2020-12-18 DIAGNOSIS — I21.4 NSTEMI (NON-ST ELEVATED MYOCARDIAL INFARCTION) (HCC): ICD-10-CM

## 2020-12-18 DIAGNOSIS — E11.9 TYPE 2 DIABETES MELLITUS WITHOUT COMPLICATION, UNSPECIFIED WHETHER LONG TERM INSULIN USE (HCC): ICD-10-CM

## 2020-12-18 DIAGNOSIS — E78.2 MIXED HYPERLIPIDEMIA: ICD-10-CM

## 2020-12-18 PROCEDURE — 3074F SYST BP LT 130 MM HG: CPT | Performed by: FAMILY MEDICINE

## 2020-12-18 PROCEDURE — 3008F BODY MASS INDEX DOCD: CPT | Performed by: FAMILY MEDICINE

## 2020-12-18 PROCEDURE — 4010F ACE/ARB THERAPY RXD/TAKEN: CPT | Performed by: FAMILY MEDICINE

## 2020-12-18 PROCEDURE — 1036F TOBACCO NON-USER: CPT | Performed by: FAMILY MEDICINE

## 2020-12-18 PROCEDURE — 3079F DIAST BP 80-89 MM HG: CPT | Performed by: FAMILY MEDICINE

## 2020-12-18 PROCEDURE — 99214 OFFICE O/P EST MOD 30 MIN: CPT | Performed by: FAMILY MEDICINE

## 2020-12-18 RX ORDER — METOPROLOL TARTRATE 50 MG/1
50 TABLET, FILM COATED ORAL EVERY 12 HOURS SCHEDULED
Qty: 180 TABLET | Refills: 1 | Status: SHIPPED | OUTPATIENT
Start: 2020-12-18 | End: 2021-01-20 | Stop reason: SDUPTHER

## 2020-12-18 RX ORDER — SILDENAFIL 50 MG/1
100 TABLET, FILM COATED ORAL DAILY PRN
Qty: 90 TABLET | Refills: 0 | Status: SHIPPED | OUTPATIENT
Start: 2020-12-18 | End: 2021-01-20 | Stop reason: SDUPTHER

## 2020-12-18 RX ORDER — LEVOFLOXACIN 500 MG/1
500 TABLET, FILM COATED ORAL EVERY 24 HOURS
Qty: 10 TABLET | Refills: 0 | Status: SHIPPED | OUTPATIENT
Start: 2020-12-18 | End: 2020-12-28

## 2020-12-18 RX ORDER — NIACIN 1000 MG/1
1000 TABLET, EXTENDED RELEASE ORAL
Qty: 90 TABLET | Refills: 1 | Status: SHIPPED | OUTPATIENT
Start: 2020-12-18 | End: 2021-03-30 | Stop reason: SDUPTHER

## 2020-12-18 RX ORDER — ROSUVASTATIN CALCIUM 40 MG/1
40 TABLET, COATED ORAL DAILY
Qty: 90 TABLET | Refills: 1 | Status: SHIPPED | OUTPATIENT
Start: 2020-12-18 | End: 2021-01-20 | Stop reason: SDUPTHER

## 2020-12-18 RX ORDER — EZETIMIBE 10 MG/1
10 TABLET ORAL DAILY
Qty: 90 TABLET | Refills: 1 | Status: SHIPPED | OUTPATIENT
Start: 2020-12-18 | End: 2021-03-30 | Stop reason: SDUPTHER

## 2020-12-18 RX ORDER — LOSARTAN POTASSIUM 25 MG/1
12.5 TABLET ORAL DAILY
Qty: 90 TABLET | Refills: 1 | Status: SHIPPED | OUTPATIENT
Start: 2020-12-18 | End: 2021-01-20 | Stop reason: SDUPTHER

## 2021-01-20 ENCOUNTER — TELEMEDICINE (OUTPATIENT)
Dept: CARDIOLOGY CLINIC | Facility: CLINIC | Age: 61
End: 2021-01-20
Payer: COMMERCIAL

## 2021-01-20 VITALS
HEIGHT: 73 IN | WEIGHT: 183 LBS | OXYGEN SATURATION: 97 % | SYSTOLIC BLOOD PRESSURE: 141 MMHG | HEART RATE: 63 BPM | BODY MASS INDEX: 24.25 KG/M2 | DIASTOLIC BLOOD PRESSURE: 82 MMHG

## 2021-01-20 DIAGNOSIS — I10 ESSENTIAL HYPERTENSION: ICD-10-CM

## 2021-01-20 DIAGNOSIS — E78.2 MIXED HYPERLIPIDEMIA: ICD-10-CM

## 2021-01-20 DIAGNOSIS — I25.10 CORONARY ARTERY DISEASE INVOLVING NATIVE CORONARY ARTERY OF NATIVE HEART WITHOUT ANGINA PECTORIS: Primary | ICD-10-CM

## 2021-01-20 DIAGNOSIS — I21.4 NSTEMI (NON-ST ELEVATED MYOCARDIAL INFARCTION) (HCC): ICD-10-CM

## 2021-01-20 DIAGNOSIS — N52.01 ERECTILE DYSFUNCTION DUE TO ARTERIAL INSUFFICIENCY: ICD-10-CM

## 2021-01-20 DIAGNOSIS — Z95.5 S/P CORONARY ARTERY STENT PLACEMENT: ICD-10-CM

## 2021-01-20 DIAGNOSIS — E11.9 TYPE 2 DIABETES MELLITUS WITHOUT COMPLICATION, UNSPECIFIED WHETHER LONG TERM INSULIN USE (HCC): ICD-10-CM

## 2021-01-20 PROCEDURE — 99214 OFFICE O/P EST MOD 30 MIN: CPT | Performed by: INTERNAL MEDICINE

## 2021-01-20 PROCEDURE — 1036F TOBACCO NON-USER: CPT | Performed by: INTERNAL MEDICINE

## 2021-01-20 PROCEDURE — 3077F SYST BP >= 140 MM HG: CPT | Performed by: INTERNAL MEDICINE

## 2021-01-20 PROCEDURE — 3008F BODY MASS INDEX DOCD: CPT | Performed by: INTERNAL MEDICINE

## 2021-01-20 PROCEDURE — 3079F DIAST BP 80-89 MM HG: CPT | Performed by: INTERNAL MEDICINE

## 2021-01-20 PROCEDURE — 4010F ACE/ARB THERAPY RXD/TAKEN: CPT | Performed by: INTERNAL MEDICINE

## 2021-01-20 RX ORDER — ROSUVASTATIN CALCIUM 40 MG/1
40 TABLET, COATED ORAL DAILY
Qty: 90 TABLET | Refills: 1 | Status: SHIPPED | OUTPATIENT
Start: 2021-01-20 | End: 2021-03-30 | Stop reason: SDUPTHER

## 2021-01-20 RX ORDER — METOPROLOL TARTRATE 50 MG/1
50 TABLET, FILM COATED ORAL EVERY 12 HOURS SCHEDULED
Qty: 180 TABLET | Refills: 1 | Status: SHIPPED | OUTPATIENT
Start: 2021-01-20 | End: 2021-03-30 | Stop reason: SDUPTHER

## 2021-01-20 RX ORDER — SILDENAFIL 50 MG/1
100 TABLET, FILM COATED ORAL DAILY PRN
Qty: 90 TABLET | Refills: 0 | Status: SHIPPED | OUTPATIENT
Start: 2021-01-20 | End: 2021-03-30 | Stop reason: SDUPTHER

## 2021-01-20 RX ORDER — LOSARTAN POTASSIUM 25 MG/1
12.5 TABLET ORAL DAILY
Qty: 90 TABLET | Refills: 1 | Status: SHIPPED | OUTPATIENT
Start: 2021-01-20 | End: 2021-03-30 | Stop reason: SDUPTHER

## 2021-01-20 NOTE — PROGRESS NOTES
Virtual Regular Visit      Assessment/Plan: All of his assessed cardiac problems are stable  I will d/c his Brilinta and start Xarelto 2 5 mg BID  The rest of his medications remains the same  No cardiac testing is ordered  RTO 9 months  Problem List Items Addressed This Visit        Endocrine    Type 2 diabetes mellitus (HCC)    Relevant Medications    rosuvastatin (CRESTOR) 40 MG tablet       Cardiovascular and Mediastinum    HTN (hypertension)    Relevant Medications    losartan (COZAAR) 25 mg tablet    metoprolol tartrate (LOPRESSOR) 50 mg tablet    NSTEMI (non-ST elevated myocardial infarction) (HCC)    Relevant Medications    sildenafil (VIAGRA) 50 MG tablet    metoprolol tartrate (LOPRESSOR) 50 mg tablet    Coronary artery disease involving native coronary artery of native heart without angina pectoris - Primary    Relevant Medications    sildenafil (VIAGRA) 50 MG tablet    metoprolol tartrate (LOPRESSOR) 50 mg tablet    rivaroxaban (XARELTO) 2 5 mg tablet       Other    Erectile dysfunction    Relevant Medications    sildenafil (VIAGRA) 50 MG tablet    Mixed hyperlipidemia    Relevant Medications    rosuvastatin (CRESTOR) 40 MG tablet      Other Visit Diagnoses     S/P coronary artery stent placement        Relevant Medications    losartan (COZAAR) 25 mg tablet    rivaroxaban (XARELTO) 2 5 mg tablet               Reason for visit is   Chief Complaint   Patient presents with    Follow-up     6 month        Encounter provider Apple Gonzalez MD    Provider located at [address] [place] [id number]      Recent Visits  No visits were found meeting these conditions     Showing recent visits within past 7 days and meeting all other requirements     Today's Visits  Date Type Provider Dept   01/20/21 Telemedicine Apple Gonzalez MD  [facility] today's visits and meeting all other requirements Future Appointments  No visits were found meeting these conditions  Showing future appointments within next 150 days and meeting all other requirements        The patient was identified by name and date of birth  Obdulia Marrero was informed that this is a telemedicine visit and that the visit is being conducted through [employer] and patient was informed that this is a secure, HIPAA-compliant platform  He agrees to proceed     My office door was closed  No one else was in the room  He acknowledged consent and understanding of privacy and security of the video platform  The patient has agreed to participate and understands they can discontinue the visit at any time  Patient is aware this is a billable service  Subjective  Obdulia Marrero is a 57 y o  male  He has CAD with LAD stenting on 8/29/2019  He did have an anterior wall MI at that time  He is doing well  He walks 3 1/2 miles 4x a week  He denies CP, SOB, palpitations  His weight is down to 285 lbs  LDL is 53  /82 donna it is usually controlled              Past Medical History:   Diagnosis Date    Asthma     Coronary artery disease     Diabetes ([facility] [address] )     HTN (hypertension)     Hypercholesteremia     Myocardial infarction [facility])        Past Surgical History:   Procedure Laterality Date    CARDIAC CATHETERIZATION      COLONOSCOPY      RESOLVED: 2003    CORONARY STENT PLACEMENT      MASS EXCISION Right     post auricular cyst removal - in office - Dr Sandor Thurman - 6/10/19    TONSILLECTOMY         Current Outpatient Medications   Medication Sig Dispense Refill    aspirin (ECOTRIN LOW STRENGTH) 81 mg EC tablet Take 1 tablet (81 mg total) by mouth daily 30 tablet 0    ezetimibe (ZETIA) 10 mg tablet Take 1 tablet (10 mg total) by mouth daily 90 tablet 1    losartan (COZAAR) 25 mg tablet Take 0 5 tablets (12 5 mg total) by mouth daily 90 tablet 1    metFORMIN (GLUCOPHAGE) 1000 MG tablet Take 1 tablet (1,000 mg total) by mouth 2 (two) times a day 180 tablet 1    metoprolol tartrate (LOPRESSOR) 50 mg tablet Take 1 tablet (50 mg total) by mouth every 12 (twelve) hours 180 tablet 1    multivitamin (THERAGRAN) TABS Take 1 tablet by mouth daily      niacin (NIASPAN) 1000 MG CR tablet Take 1 tablet (1,000 mg total) by mouth daily at bedtime 90 tablet 1    Omega-3 Fatty Acids (FISH OIL) 1200 MG CAPS Take by mouth      rosuvastatin (CRESTOR) 40 MG tablet Take 1 tablet (40 mg total) by mouth daily 90 tablet 1    sildenafil (VIAGRA) 50 MG tablet Take 2 tablets (100 mg total) by mouth daily as needed for erectile dysfunction 90 tablet 0    guaiFENesin (ROBITUSSIN) 100 MG/5ML oral liquid Take 10 mL (200 mg total) by mouth 3 (three) times a day as needed for cough (Patient not taking: Reported on 12/18/2020) 120 mL 0    loratadine (CLARITIN) 10 mg tablet Take 1 tablet (10 mg total) by mouth daily (Patient not taking: Reported on 12/18/2020) 90 tablet 3    nitroglycerin (NITROSTAT) 0 4 mg SL tablet Place 1 tablet (0 4 mg total) under the tongue every 5 (five) minutes as needed for chest pain (Patient not taking: Reported on 1/20/2021) 30 tablet 0    rivaroxaban (XARELTO) 2 5 mg tablet Take 6 tablets (15 mg total) by mouth 2 (two) times a day 180 tablet 90     No current facility-administered medications for this visit  Allergies   Allergen Reactions    Penicillins Throat Swelling    Shellfish Allergy Throat Swelling       Review of Systems   Constitutional: Negative for diaphoresis, fatigue, fever and unexpected weight change  HENT: Negative  Respiratory: Negative for cough, shortness of breath and wheezing  Cardiovascular: Negative for chest pain, palpitations and leg swelling  Gastrointestinal: Negative for abdominal pain, diarrhea and nausea  Musculoskeletal: Negative for gait problem and myalgias  Skin: Negative for rash  Neurological: Negative for dizziness and numbness  Psychiatric/Behavioral: Negative  Video Exam    Vitals:    01/20/21 0937   BP: 141/82   Pulse: 63   SpO2: 97%   Weight: 83 kg (183 lb)   Height: 6' 1" (1 854 m)       Physical Exam  Constitutional:       Appearance: Normal appearance  Neurological:      Mental Status: He is alert and oriented to person, place, and time  I spent 30 minutes directly with the patient during this visit      VIRTUAL VISIT DISCLAIMER    Bradford Joshua acknowledges that he has consented to an online visit or consultation  He understands that the online visit is based solely on information provided by him, and that, in the absence of a face-to-face physical evaluation by the physician, the diagnosis he receives is both limited and provisional in terms of accuracy and completeness  This is not intended to replace a full medical face-to-face evaluation by the physician  Bradford Joshua understands and accepts these terms

## 2021-01-22 DIAGNOSIS — Z95.5 S/P CORONARY ARTERY STENT PLACEMENT: ICD-10-CM

## 2021-01-22 DIAGNOSIS — I25.10 CORONARY ARTERY DISEASE INVOLVING NATIVE CORONARY ARTERY OF NATIVE HEART WITHOUT ANGINA PECTORIS: ICD-10-CM

## 2021-02-10 DIAGNOSIS — E11.9 TYPE 2 DIABETES MELLITUS WITHOUT COMPLICATION, UNSPECIFIED WHETHER LONG TERM INSULIN USE (HCC): ICD-10-CM

## 2021-02-10 DIAGNOSIS — J40 BRONCHITIS: ICD-10-CM

## 2021-03-25 ENCOUNTER — APPOINTMENT (OUTPATIENT)
Dept: LAB | Age: 61
End: 2021-03-25
Payer: COMMERCIAL

## 2021-03-25 DIAGNOSIS — E11.9 TYPE 2 DIABETES MELLITUS WITHOUT COMPLICATION, UNSPECIFIED WHETHER LONG TERM INSULIN USE (HCC): ICD-10-CM

## 2021-03-25 DIAGNOSIS — I21.4 NSTEMI (NON-ST ELEVATED MYOCARDIAL INFARCTION) (HCC): ICD-10-CM

## 2021-03-25 DIAGNOSIS — I25.10 CORONARY ARTERY DISEASE INVOLVING NATIVE CORONARY ARTERY OF NATIVE HEART WITHOUT ANGINA PECTORIS: ICD-10-CM

## 2021-03-25 DIAGNOSIS — I10 ESSENTIAL HYPERTENSION: ICD-10-CM

## 2021-03-25 DIAGNOSIS — N52.01 ERECTILE DYSFUNCTION DUE TO ARTERIAL INSUFFICIENCY: ICD-10-CM

## 2021-03-25 DIAGNOSIS — E78.2 MIXED HYPERLIPIDEMIA: ICD-10-CM

## 2021-03-25 DIAGNOSIS — Z95.5 S/P CORONARY ARTERY STENT PLACEMENT: ICD-10-CM

## 2021-03-25 LAB
ALBUMIN SERPL BCP-MCNC: 3.9 G/DL (ref 3.5–5)
ALP SERPL-CCNC: 59 U/L (ref 46–116)
ALT SERPL W P-5'-P-CCNC: 23 U/L (ref 12–78)
ANION GAP SERPL CALCULATED.3IONS-SCNC: 1 MMOL/L (ref 4–13)
AST SERPL W P-5'-P-CCNC: 12 U/L (ref 5–45)
BASOPHILS # BLD AUTO: 0.06 THOUSANDS/ΜL (ref 0–0.1)
BASOPHILS NFR BLD AUTO: 1 % (ref 0–1)
BILIRUB SERPL-MCNC: 1.27 MG/DL (ref 0.2–1)
BUN SERPL-MCNC: 14 MG/DL (ref 5–25)
CALCIUM SERPL-MCNC: 9.2 MG/DL (ref 8.3–10.1)
CHLORIDE SERPL-SCNC: 110 MMOL/L (ref 100–108)
CHOLEST SERPL-MCNC: 83 MG/DL (ref 50–200)
CO2 SERPL-SCNC: 31 MMOL/L (ref 21–32)
CREAT SERPL-MCNC: 0.87 MG/DL (ref 0.6–1.3)
CREAT UR-MCNC: 172 MG/DL
EOSINOPHIL # BLD AUTO: 0.22 THOUSAND/ΜL (ref 0–0.61)
EOSINOPHIL NFR BLD AUTO: 4 % (ref 0–6)
ERYTHROCYTE [DISTWIDTH] IN BLOOD BY AUTOMATED COUNT: 12 % (ref 11.6–15.1)
EST. AVERAGE GLUCOSE BLD GHB EST-MCNC: 111 MG/DL
GFR SERPL CREATININE-BSD FRML MDRD: 92 ML/MIN/1.73SQ M
GLUCOSE P FAST SERPL-MCNC: 86 MG/DL (ref 65–99)
HBA1C MFR BLD: 5.5 %
HCT VFR BLD AUTO: 38.6 % (ref 36.5–49.3)
HDLC SERPL-MCNC: 48 MG/DL
HGB BLD-MCNC: 12.5 G/DL (ref 12–17)
IMM GRANULOCYTES # BLD AUTO: 0.01 THOUSAND/UL (ref 0–0.2)
IMM GRANULOCYTES NFR BLD AUTO: 0 % (ref 0–2)
LDLC SERPL CALC-MCNC: 22 MG/DL (ref 0–100)
LYMPHOCYTES # BLD AUTO: 1.71 THOUSANDS/ΜL (ref 0.6–4.47)
LYMPHOCYTES NFR BLD AUTO: 33 % (ref 14–44)
MCH RBC QN AUTO: 30.3 PG (ref 26.8–34.3)
MCHC RBC AUTO-ENTMCNC: 32.4 G/DL (ref 31.4–37.4)
MCV RBC AUTO: 94 FL (ref 82–98)
MICROALBUMIN UR-MCNC: 17.8 MG/L (ref 0–20)
MICROALBUMIN/CREAT 24H UR: 10 MG/G CREATININE (ref 0–30)
MONOCYTES # BLD AUTO: 0.41 THOUSAND/ΜL (ref 0.17–1.22)
MONOCYTES NFR BLD AUTO: 8 % (ref 4–12)
NEUTROPHILS # BLD AUTO: 2.8 THOUSANDS/ΜL (ref 1.85–7.62)
NEUTS SEG NFR BLD AUTO: 54 % (ref 43–75)
NONHDLC SERPL-MCNC: 35 MG/DL
NRBC BLD AUTO-RTO: 0 /100 WBCS
PLATELET # BLD AUTO: 181 THOUSANDS/UL (ref 149–390)
PMV BLD AUTO: 10 FL (ref 8.9–12.7)
POTASSIUM SERPL-SCNC: 4.4 MMOL/L (ref 3.5–5.3)
PROT SERPL-MCNC: 7.4 G/DL (ref 6.4–8.2)
RBC # BLD AUTO: 4.12 MILLION/UL (ref 3.88–5.62)
SODIUM SERPL-SCNC: 142 MMOL/L (ref 136–145)
TRIGL SERPL-MCNC: 64 MG/DL
TSH SERPL DL<=0.05 MIU/L-ACNC: 1.76 UIU/ML (ref 0.36–3.74)
WBC # BLD AUTO: 5.21 THOUSAND/UL (ref 4.31–10.16)

## 2021-03-25 PROCEDURE — 3061F NEG MICROALBUMINURIA REV: CPT | Performed by: FAMILY MEDICINE

## 2021-03-25 PROCEDURE — 80061 LIPID PANEL: CPT

## 2021-03-25 PROCEDURE — 36415 COLL VENOUS BLD VENIPUNCTURE: CPT

## 2021-03-25 PROCEDURE — 85025 COMPLETE CBC W/AUTO DIFF WBC: CPT

## 2021-03-25 PROCEDURE — 82570 ASSAY OF URINE CREATININE: CPT | Performed by: FAMILY MEDICINE

## 2021-03-25 PROCEDURE — 3044F HG A1C LEVEL LT 7.0%: CPT | Performed by: FAMILY MEDICINE

## 2021-03-25 PROCEDURE — 84443 ASSAY THYROID STIM HORMONE: CPT

## 2021-03-25 PROCEDURE — 82043 UR ALBUMIN QUANTITATIVE: CPT | Performed by: FAMILY MEDICINE

## 2021-03-25 PROCEDURE — 80053 COMPREHEN METABOLIC PANEL: CPT

## 2021-03-25 PROCEDURE — 83036 HEMOGLOBIN GLYCOSYLATED A1C: CPT

## 2021-03-30 ENCOUNTER — OFFICE VISIT (OUTPATIENT)
Dept: FAMILY MEDICINE CLINIC | Facility: CLINIC | Age: 61
End: 2021-03-30
Payer: COMMERCIAL

## 2021-03-30 VITALS
HEIGHT: 73 IN | TEMPERATURE: 97.2 F | BODY MASS INDEX: 25.58 KG/M2 | DIASTOLIC BLOOD PRESSURE: 70 MMHG | WEIGHT: 193 LBS | SYSTOLIC BLOOD PRESSURE: 130 MMHG

## 2021-03-30 DIAGNOSIS — I21.4 NSTEMI (NON-ST ELEVATED MYOCARDIAL INFARCTION) (HCC): ICD-10-CM

## 2021-03-30 DIAGNOSIS — E11.9 TYPE 2 DIABETES MELLITUS WITHOUT COMPLICATION, UNSPECIFIED WHETHER LONG TERM INSULIN USE (HCC): ICD-10-CM

## 2021-03-30 DIAGNOSIS — Z12.11 COLON CANCER SCREENING: Primary | ICD-10-CM

## 2021-03-30 DIAGNOSIS — I25.10 CORONARY ARTERY DISEASE INVOLVING NATIVE CORONARY ARTERY OF NATIVE HEART WITHOUT ANGINA PECTORIS: ICD-10-CM

## 2021-03-30 DIAGNOSIS — Z95.5 S/P CORONARY ARTERY STENT PLACEMENT: ICD-10-CM

## 2021-03-30 DIAGNOSIS — N52.01 ERECTILE DYSFUNCTION DUE TO ARTERIAL INSUFFICIENCY: ICD-10-CM

## 2021-03-30 DIAGNOSIS — J40 BRONCHITIS: ICD-10-CM

## 2021-03-30 PROCEDURE — 1036F TOBACCO NON-USER: CPT | Performed by: FAMILY MEDICINE

## 2021-03-30 PROCEDURE — 3078F DIAST BP <80 MM HG: CPT | Performed by: FAMILY MEDICINE

## 2021-03-30 PROCEDURE — 4010F ACE/ARB THERAPY RXD/TAKEN: CPT | Performed by: FAMILY MEDICINE

## 2021-03-30 PROCEDURE — 3075F SYST BP GE 130 - 139MM HG: CPT | Performed by: FAMILY MEDICINE

## 2021-03-30 PROCEDURE — 99214 OFFICE O/P EST MOD 30 MIN: CPT | Performed by: FAMILY MEDICINE

## 2021-03-30 PROCEDURE — 3008F BODY MASS INDEX DOCD: CPT | Performed by: FAMILY MEDICINE

## 2021-03-30 RX ORDER — NIACIN 1000 MG/1
1000 TABLET, EXTENDED RELEASE ORAL
Qty: 90 TABLET | Refills: 1 | Status: SHIPPED | OUTPATIENT
Start: 2021-03-30 | End: 2021-06-30 | Stop reason: SDUPTHER

## 2021-03-30 RX ORDER — LOSARTAN POTASSIUM 25 MG/1
12.5 TABLET ORAL DAILY
Qty: 90 TABLET | Refills: 1 | Status: SHIPPED | OUTPATIENT
Start: 2021-03-30 | End: 2021-05-13 | Stop reason: SDUPTHER

## 2021-03-30 RX ORDER — ROSUVASTATIN CALCIUM 40 MG/1
40 TABLET, COATED ORAL DAILY
Qty: 90 TABLET | Refills: 1 | Status: SHIPPED | OUTPATIENT
Start: 2021-03-30 | End: 2021-06-30 | Stop reason: SDUPTHER

## 2021-03-30 RX ORDER — METOPROLOL TARTRATE 50 MG/1
50 TABLET, FILM COATED ORAL EVERY 12 HOURS SCHEDULED
Qty: 180 TABLET | Refills: 1 | Status: SHIPPED | OUTPATIENT
Start: 2021-03-30 | End: 2021-06-30 | Stop reason: SDUPTHER

## 2021-03-30 RX ORDER — EZETIMIBE 10 MG/1
10 TABLET ORAL DAILY
Qty: 90 TABLET | Refills: 1 | Status: SHIPPED | OUTPATIENT
Start: 2021-03-30 | End: 2021-06-30 | Stop reason: SDUPTHER

## 2021-03-30 RX ORDER — SILDENAFIL 100 MG/1
100 TABLET, FILM COATED ORAL DAILY PRN
Qty: 30 TABLET | Refills: 3 | Status: SHIPPED | OUTPATIENT
Start: 2021-03-30 | End: 2021-06-30 | Stop reason: SDUPTHER

## 2021-03-30 NOTE — PROGRESS NOTES
Assessment/Plan:  He is doing well  Continue current therapy  He will follow-up with Dr Antonio Whitman  Diagnoses and all orders for this visit:    S/P coronary artery stent placement  -     losartan (COZAAR) 25 mg tablet; Take 0 5 tablets (12 5 mg total) by mouth daily  -     rivaroxaban (XARELTO) 2 5 mg tablet; Take 1 tablet (2 5 mg total) by mouth 2 (two) times a day    Type 2 diabetes mellitus without complication, unspecified whether long term insulin use (HCC)  -     metFORMIN (GLUCOPHAGE) 1000 MG tablet; Take 1 tablet (1,000 mg total) by mouth 2 (two) times a day  -     niacin (NIASPAN) 1000 MG CR tablet; Take 1 tablet (1,000 mg total) by mouth daily at bedtime  -     rosuvastatin (CRESTOR) 40 MG tablet; Take 1 tablet (40 mg total) by mouth daily  -     ezetimibe (ZETIA) 10 mg tablet; Take 1 tablet (10 mg total) by mouth daily    Bronchitis  -     metFORMIN (GLUCOPHAGE) 1000 MG tablet; Take 1 tablet (1,000 mg total) by mouth 2 (two) times a day    NSTEMI (non-ST elevated myocardial infarction) ([facility])  -     metoprolol tartrate (LOPRESSOR) 50 mg tablet; Take 1 tablet (50 mg total) by mouth every 12 (twelve) hours    Erectile dysfunction due to arterial insufficiency  -     sildenafil (VIAGRA) 100 mg tablet; Take 1 tablet (100 mg total) by mouth daily as needed for erectile dysfunction    Coronary artery disease involving native coronary artery of native heart without angina pectoris  -     rivaroxaban (XARELTO) 2 5 mg tablet; Take 1 tablet (2 5 mg total) by mouth 2 (two) times a day            Subjective:        Patient ID: Obdulia Marrero is a 57 y o  male  Patient presents with:  Medication Refill: lab work and refill on medication     He is doing well          The following portions of the patient's history were reviewed and updated as appropriate: allergies, current medications, past family history, past medical history, past social history, past surgical history and problem list       Review of Systems   Constitutional: Negative  HENT: Negative  Eyes: Negative  Respiratory: Negative  Cardiovascular: Negative  Gastrointestinal: Negative  Endocrine: Negative  Genitourinary: Negative  Musculoskeletal: Negative  Skin: Negative  Allergic/Immunologic: Negative  Neurological: Negative  Hematological: Negative  Psychiatric/Behavioral: Negative  All other systems reviewed and are negative  Objective:      BMI Counseling: Body mass index is 25 46 kg/m²  The BMI is above normal  Nutrition recommendations include decreasing portion sizes, encouraging healthy choices of fruits and vegetables, decreasing fast food intake, consuming healthier snacks, limiting drinks that contain sugar, moderation in carbohydrate intake, increasing intake of lean protein, reducing intake of saturated and trans fat and reducing intake of cholesterol  Exercise recommendations include moderate physical activity 150 minutes/week  No pharmacotherapy was ordered  /70   Temp (!) 97 2 °F (36 2 °C)   Ht 6' 1" (1 854 m)   Wt 87 5 kg (193 lb)   BMI 25 46 kg/m²          Physical Exam  Vitals signs and nursing note reviewed  Constitutional:       Appearance: He is well-developed  HENT:      Head: Normocephalic and atraumatic  Right Ear: External ear normal       Left Ear: External ear normal       Nose: Nose normal    Eyes:      Conjunctiva/sclera: Conjunctivae normal       Pupils: Pupils are equal, round, and reactive to light  Neck:      Musculoskeletal: Normal range of motion and neck supple  Cardiovascular:      Rate and Rhythm: Normal rate and regular rhythm  Heart sounds: Normal heart sounds  Pulmonary:      Effort: Pulmonary effort is normal       Breath sounds: Normal breath sounds  Abdominal:      General: Bowel sounds are normal       Palpations: Abdomen is soft  Musculoskeletal: Normal range of motion  Skin:     General: Skin is warm and dry  Neurological:      Mental Status: He is alert and oriented to person, place, and time  Deep Tendon Reflexes: Reflexes are normal and symmetric     Psychiatric:         Behavior: Behavior normal

## 2021-05-13 DIAGNOSIS — Z95.5 S/P CORONARY ARTERY STENT PLACEMENT: ICD-10-CM

## 2021-05-13 DIAGNOSIS — I25.10 CORONARY ARTERY DISEASE INVOLVING NATIVE CORONARY ARTERY OF NATIVE HEART WITHOUT ANGINA PECTORIS: ICD-10-CM

## 2021-05-13 NOTE — TELEPHONE ENCOUNTER
Patient is calling from [place] and needs refills of Xarelto and losartan  He was seen in March 2021  Have placed orders for approval  Thank you

## 2021-05-14 RX ORDER — LOSARTAN POTASSIUM 25 MG/1
12.5 TABLET ORAL DAILY
Qty: 30 TABLET | Refills: 0 | Status: SHIPPED | OUTPATIENT
Start: 2021-05-14 | End: 2021-06-30 | Stop reason: SDUPTHER

## 2021-05-19 DIAGNOSIS — I25.10 CORONARY ARTERY DISEASE INVOLVING NATIVE CORONARY ARTERY OF NATIVE HEART WITHOUT ANGINA PECTORIS: ICD-10-CM

## 2021-05-19 DIAGNOSIS — Z95.5 S/P CORONARY ARTERY STENT PLACEMENT: ICD-10-CM

## 2021-05-27 ENCOUNTER — TELEPHONE (OUTPATIENT)
Dept: FAMILY MEDICINE CLINIC | Facility: CLINIC | Age: 61
End: 2021-05-27

## 2021-06-30 ENCOUNTER — OFFICE VISIT (OUTPATIENT)
Dept: FAMILY MEDICINE CLINIC | Facility: CLINIC | Age: 61
End: 2021-06-30
Payer: COMMERCIAL

## 2021-06-30 VITALS — SYSTOLIC BLOOD PRESSURE: 120 MMHG | DIASTOLIC BLOOD PRESSURE: 70 MMHG

## 2021-06-30 DIAGNOSIS — Z95.5 S/P CORONARY ARTERY STENT PLACEMENT: ICD-10-CM

## 2021-06-30 DIAGNOSIS — E11.00 TYPE 2 DIABETES MELLITUS WITH HYPEROSMOLARITY WITHOUT COMA, WITHOUT LONG-TERM CURRENT USE OF INSULIN (HCC): Primary | ICD-10-CM

## 2021-06-30 DIAGNOSIS — J40 BRONCHITIS: ICD-10-CM

## 2021-06-30 DIAGNOSIS — N52.01 ERECTILE DYSFUNCTION DUE TO ARTERIAL INSUFFICIENCY: ICD-10-CM

## 2021-06-30 DIAGNOSIS — I25.10 CORONARY ARTERY DISEASE INVOLVING NATIVE CORONARY ARTERY OF NATIVE HEART WITHOUT ANGINA PECTORIS: ICD-10-CM

## 2021-06-30 DIAGNOSIS — E11.9 TYPE 2 DIABETES MELLITUS WITHOUT COMPLICATION, UNSPECIFIED WHETHER LONG TERM INSULIN USE (HCC): ICD-10-CM

## 2021-06-30 DIAGNOSIS — I10 ESSENTIAL HYPERTENSION: ICD-10-CM

## 2021-06-30 DIAGNOSIS — E78.2 MIXED HYPERLIPIDEMIA: ICD-10-CM

## 2021-06-30 DIAGNOSIS — Z88.9 H/O SEASONAL ALLERGIES: ICD-10-CM

## 2021-06-30 DIAGNOSIS — I21.4 NSTEMI (NON-ST ELEVATED MYOCARDIAL INFARCTION) (HCC): ICD-10-CM

## 2021-06-30 LAB — SL AMB POCT HEMOGLOBIN AIC: 5.3 (ref ?–6.5)

## 2021-06-30 PROCEDURE — 3725F SCREEN DEPRESSION PERFORMED: CPT | Performed by: FAMILY MEDICINE

## 2021-06-30 PROCEDURE — 83036 HEMOGLOBIN GLYCOSYLATED A1C: CPT | Performed by: FAMILY MEDICINE

## 2021-06-30 PROCEDURE — 1036F TOBACCO NON-USER: CPT | Performed by: FAMILY MEDICINE

## 2021-06-30 PROCEDURE — 3044F HG A1C LEVEL LT 7.0%: CPT | Performed by: FAMILY MEDICINE

## 2021-06-30 PROCEDURE — 3078F DIAST BP <80 MM HG: CPT | Performed by: FAMILY MEDICINE

## 2021-06-30 PROCEDURE — 4010F ACE/ARB THERAPY RXD/TAKEN: CPT | Performed by: FAMILY MEDICINE

## 2021-06-30 PROCEDURE — 3074F SYST BP LT 130 MM HG: CPT | Performed by: FAMILY MEDICINE

## 2021-06-30 PROCEDURE — 99214 OFFICE O/P EST MOD 30 MIN: CPT | Performed by: FAMILY MEDICINE

## 2021-06-30 RX ORDER — SILDENAFIL 100 MG/1
100 TABLET, FILM COATED ORAL DAILY PRN
Qty: 30 TABLET | Refills: 3 | Status: SHIPPED | OUTPATIENT
Start: 2021-06-30 | End: 2022-07-18 | Stop reason: SDUPTHER

## 2021-06-30 RX ORDER — NIACIN 1000 MG/1
1000 TABLET, EXTENDED RELEASE ORAL
Qty: 90 TABLET | Refills: 1 | Status: SHIPPED | OUTPATIENT
Start: 2021-06-30 | End: 2022-04-11

## 2021-06-30 RX ORDER — NITROGLYCERIN 0.4 MG/1
0.4 TABLET SUBLINGUAL
Qty: 30 TABLET | Refills: 0 | Status: SHIPPED | OUTPATIENT
Start: 2021-06-30

## 2021-06-30 RX ORDER — LORATADINE 10 MG/1
10 TABLET ORAL DAILY
Qty: 90 TABLET | Refills: 1 | Status: SHIPPED | OUTPATIENT
Start: 2021-06-30 | End: 2022-07-18 | Stop reason: SDUPTHER

## 2021-06-30 RX ORDER — EZETIMIBE 10 MG/1
10 TABLET ORAL DAILY
Qty: 90 TABLET | Refills: 1 | Status: SHIPPED | OUTPATIENT
Start: 2021-06-30 | End: 2022-06-22 | Stop reason: SDUPTHER

## 2021-06-30 RX ORDER — ROSUVASTATIN CALCIUM 40 MG/1
40 TABLET, COATED ORAL DAILY
Qty: 90 TABLET | Refills: 1 | Status: SHIPPED | OUTPATIENT
Start: 2021-06-30 | End: 2022-06-20

## 2021-06-30 RX ORDER — METOPROLOL TARTRATE 50 MG/1
50 TABLET, FILM COATED ORAL EVERY 12 HOURS SCHEDULED
Qty: 180 TABLET | Refills: 1 | Status: SHIPPED | OUTPATIENT
Start: 2021-06-30 | End: 2022-06-22 | Stop reason: SDUPTHER

## 2021-06-30 RX ORDER — LOSARTAN POTASSIUM 25 MG/1
12.5 TABLET ORAL DAILY
Qty: 45 TABLET | Refills: 1 | Status: SHIPPED | OUTPATIENT
Start: 2021-06-30 | End: 2022-07-18 | Stop reason: SDUPTHER

## 2021-06-30 NOTE — PROGRESS NOTES
Assessment/Plan: A1c is 5 3  Monofilament test normal at this time  Patient will see ophthalmologist in the future  Diabetes stable  Patient did 1st COVID vaccine  Labs ordered  Refills given on meds for chronic conditions listed which are relatively stable  Patient will follow-up with Cardiology appropriately  Patient will follow up 3 months or as needed  Diagnoses and all orders for this visit:    Type 2 diabetes mellitus with hyperosmolarity without coma, without long-term current use of insulin (HCC)  -     POCT hemoglobin A1c  -     Ambulatory referral to Ophthalmology; Future  -     CBC and differential; Future  -     Comprehensive metabolic panel; Future  -     Lipid panel; Future  -     Microalbumin / creatinine urine ratio  -     TSH, 3rd generation with Free T4 reflex; Future    Type 2 diabetes mellitus without complication, unspecified whether long term insulin use (HCC)  -     ezetimibe (ZETIA) 10 mg tablet; Take 1 tablet (10 mg total) by mouth daily  -     metFORMIN (GLUCOPHAGE) 1000 MG tablet; Take 1 tablet (1,000 mg total) by mouth 2 (two) times a day  -     niacin (NIASPAN) 1000 MG CR tablet; Take 1 tablet (1,000 mg total) by mouth daily at bedtime  -     rosuvastatin (CRESTOR) 40 MG tablet; Take 1 tablet (40 mg total) by mouth daily    H/O seasonal allergies  -     loratadine (CLARITIN) 10 mg tablet; Take 1 tablet (10 mg total) by mouth daily    S/P coronary artery stent placement  -     losartan (COZAAR) 25 mg tablet; Take 0 5 tablets (12 5 mg total) by mouth daily  -     rivaroxaban (XARELTO) 2 5 mg tablet; Take 1 tablet (2 5 mg total) by mouth 2 (two) times a day  -     CBC and differential; Future  -     Comprehensive metabolic panel; Future  -     Lipid panel; Future  -     Microalbumin / creatinine urine ratio  -     TSH, 3rd generation with Free T4 reflex; Future    Bronchitis  -     metFORMIN (GLUCOPHAGE) 1000 MG tablet;  Take 1 tablet (1,000 mg total) by mouth 2 (two) times a day    NSTEMI (non-ST elevated myocardial infarction) (HCC)  -     metoprolol tartrate (LOPRESSOR) 50 mg tablet; Take 1 tablet (50 mg total) by mouth every 12 (twelve) hours  -     nitroglycerin (NITROSTAT) 0 4 mg SL tablet; Place 1 tablet (0 4 mg total) under the tongue every 5 (five) minutes as needed for chest pain  -     CBC and differential; Future  -     Comprehensive metabolic panel; Future  -     Lipid panel; Future  -     Microalbumin / creatinine urine ratio  -     TSH, 3rd generation with Free T4 reflex; Future    Coronary artery disease involving native coronary artery of native heart without angina pectoris  -     rivaroxaban (XARELTO) 2 5 mg tablet; Take 1 tablet (2 5 mg total) by mouth 2 (two) times a day  -     CBC and differential; Future  -     Comprehensive metabolic panel; Future  -     Lipid panel; Future  -     Microalbumin / creatinine urine ratio  -     TSH, 3rd generation with Free T4 reflex; Future    Erectile dysfunction due to arterial insufficiency  -     sildenafil (VIAGRA) 100 mg tablet; Take 1 tablet (100 mg total) by mouth daily as needed for erectile dysfunction  -     CBC and differential; Future  -     Comprehensive metabolic panel; Future  -     Lipid panel; Future  -     Microalbumin / creatinine urine ratio  -     TSH, 3rd generation with Free T4 reflex; Future    Mixed hyperlipidemia  -     CBC and differential; Future  -     Comprehensive metabolic panel; Future  -     Lipid panel; Future  -     Microalbumin / creatinine urine ratio  -     TSH, 3rd generation with Free T4 reflex; Future    Essential hypertension  -     CBC and differential; Future  -     Comprehensive metabolic panel; Future  -     Lipid panel; Future  -     Microalbumin / creatinine urine ratio  -     TSH, 3rd generation with Free T4 reflex; Future            Subjective:        Patient ID: Charles Adan is a 57 y o  male  Patient follow-up on diabetes hypertension hyperlipidemia CAD allergies  Patient is walking over 3 miles daily patient is ruling also  Patient does notice some shortness of breath with exertion of pills  No chest pain  No problems with urination defecation abdominal pain  Feet stable overall  The following portions of the patient's history were reviewed and updated as appropriate: allergies, current medications, past family history, past medical history, past social history, past surgical history and problem list       Review of Systems   Constitutional: Negative  HENT: Negative  Eyes: Negative  Respiratory: Positive for shortness of breath  Cardiovascular: Negative  Gastrointestinal: Negative  Endocrine: Negative  Genitourinary: Negative  Musculoskeletal: Negative  Skin: Negative  Allergic/Immunologic: Negative  Neurological: Negative  Hematological: Negative  Psychiatric/Behavioral: Negative  Objective:      BMI Counseling: There is no height or weight on file to calculate BMI  The BMI is above normal  Nutrition recommendations include decreasing portion sizes  Exercise recommendations include moderate physical activity 150 minutes/week  Depression Screening and Follow-up Plan: Patient's depression screening was positive with a PHQ-2 score of 0  Clincally patient does not have depression  No treatment is required  There were no vitals taken for this visit  Physical Exam  Vitals and nursing note reviewed  Constitutional:       General: He is not in acute distress  Appearance: Normal appearance  He is not ill-appearing, toxic-appearing or diaphoretic  HENT:      Head: Normocephalic and atraumatic  Right Ear: Tympanic membrane, ear canal and external ear normal  There is no impacted cerumen  Left Ear: Tympanic membrane, ear canal and external ear normal  There is no impacted cerumen  Nose: Nose normal  No congestion or rhinorrhea        Mouth/Throat:      Mouth: Mucous membranes are moist       Pharynx: No oropharyngeal exudate or posterior oropharyngeal erythema  Eyes:      General: No scleral icterus  Right eye: No discharge  Left eye: No discharge  Extraocular Movements: Extraocular movements intact  Conjunctiva/sclera: Conjunctivae normal       Pupils: Pupils are equal, round, and reactive to light  Neck:      Vascular: No carotid bruit  Cardiovascular:      Rate and Rhythm: Normal rate and regular rhythm  Pulses: Normal pulses  Heart sounds: Normal heart sounds  No murmur heard  No friction rub  No gallop  Pulmonary:      Effort: Pulmonary effort is normal  No respiratory distress  Breath sounds: Normal breath sounds  No stridor  No wheezing, rhonchi or rales  Chest:      Chest wall: No tenderness  Abdominal:      General: Abdomen is flat  Bowel sounds are normal  There is no distension  Palpations: Abdomen is soft  Tenderness: There is no abdominal tenderness  There is no guarding or rebound  Musculoskeletal:         General: No swelling, tenderness, deformity or signs of injury  Normal range of motion  Cervical back: Normal range of motion and neck supple  No rigidity  No muscular tenderness  Right lower leg: No edema  Left lower leg: No edema  Lymphadenopathy:      Cervical: No cervical adenopathy  Skin:     General: Skin is warm and dry  Capillary Refill: Capillary refill takes less than 2 seconds  Coloration: Skin is not jaundiced  Findings: No bruising, erythema, lesion or rash  Neurological:      Mental Status: He is alert and oriented to person, place, and time  Mental status is at baseline  Cranial Nerves: No cranial nerve deficit  Sensory: No sensory deficit  Motor: No weakness  Coordination: Coordination normal       Gait: Gait normal    Psychiatric:         Mood and Affect: Mood normal          Behavior: Behavior normal          Thought Content:  Thought content normal          Judgment: Judgment normal

## 2021-07-01 ENCOUNTER — APPOINTMENT (OUTPATIENT)
Dept: LAB | Age: 61
End: 2021-07-01
Payer: COMMERCIAL

## 2021-07-01 DIAGNOSIS — I10 ESSENTIAL HYPERTENSION: ICD-10-CM

## 2021-07-01 DIAGNOSIS — N52.01 ERECTILE DYSFUNCTION DUE TO ARTERIAL INSUFFICIENCY: ICD-10-CM

## 2021-07-01 DIAGNOSIS — E11.00 TYPE 2 DIABETES MELLITUS WITH HYPEROSMOLARITY WITHOUT COMA, WITHOUT LONG-TERM CURRENT USE OF INSULIN (HCC): ICD-10-CM

## 2021-07-01 DIAGNOSIS — I25.10 CORONARY ARTERY DISEASE INVOLVING NATIVE CORONARY ARTERY OF NATIVE HEART WITHOUT ANGINA PECTORIS: ICD-10-CM

## 2021-07-01 DIAGNOSIS — Z95.5 S/P CORONARY ARTERY STENT PLACEMENT: ICD-10-CM

## 2021-07-01 DIAGNOSIS — I21.4 NSTEMI (NON-ST ELEVATED MYOCARDIAL INFARCTION) (HCC): ICD-10-CM

## 2021-07-01 DIAGNOSIS — E78.2 MIXED HYPERLIPIDEMIA: ICD-10-CM

## 2021-07-01 LAB
ALBUMIN SERPL BCP-MCNC: 3.7 G/DL (ref 3.5–5)
ALP SERPL-CCNC: 62 U/L (ref 46–116)
ALT SERPL W P-5'-P-CCNC: 20 U/L (ref 12–78)
ANION GAP SERPL CALCULATED.3IONS-SCNC: 4 MMOL/L (ref 4–13)
AST SERPL W P-5'-P-CCNC: 16 U/L (ref 5–45)
BASOPHILS # BLD AUTO: 0.07 THOUSANDS/ΜL (ref 0–0.1)
BASOPHILS NFR BLD AUTO: 1 % (ref 0–1)
BILIRUB SERPL-MCNC: 1.21 MG/DL (ref 0.2–1)
BUN SERPL-MCNC: 17 MG/DL (ref 5–25)
CALCIUM SERPL-MCNC: 9.4 MG/DL (ref 8.3–10.1)
CHLORIDE SERPL-SCNC: 109 MMOL/L (ref 100–108)
CHOLEST SERPL-MCNC: 144 MG/DL (ref 50–200)
CO2 SERPL-SCNC: 27 MMOL/L (ref 21–32)
CREAT SERPL-MCNC: 0.91 MG/DL (ref 0.6–1.3)
CREAT UR-MCNC: 150 MG/DL
EOSINOPHIL # BLD AUTO: 0.28 THOUSAND/ΜL (ref 0–0.61)
EOSINOPHIL NFR BLD AUTO: 5 % (ref 0–6)
ERYTHROCYTE [DISTWIDTH] IN BLOOD BY AUTOMATED COUNT: 11.9 % (ref 11.6–15.1)
GFR SERPL CREATININE-BSD FRML MDRD: 90 ML/MIN/1.73SQ M
GLUCOSE P FAST SERPL-MCNC: 83 MG/DL (ref 65–99)
HCT VFR BLD AUTO: 41 % (ref 36.5–49.3)
HDLC SERPL-MCNC: 58 MG/DL
HGB BLD-MCNC: 13.4 G/DL (ref 12–17)
IMM GRANULOCYTES # BLD AUTO: 0.01 THOUSAND/UL (ref 0–0.2)
IMM GRANULOCYTES NFR BLD AUTO: 0 % (ref 0–2)
LDLC SERPL CALC-MCNC: 71 MG/DL (ref 0–100)
LYMPHOCYTES # BLD AUTO: 1.95 THOUSANDS/ΜL (ref 0.6–4.47)
LYMPHOCYTES NFR BLD AUTO: 32 % (ref 14–44)
MCH RBC QN AUTO: 31.1 PG (ref 26.8–34.3)
MCHC RBC AUTO-ENTMCNC: 32.7 G/DL (ref 31.4–37.4)
MCV RBC AUTO: 95 FL (ref 82–98)
MICROALBUMIN UR-MCNC: 12.9 MG/L (ref 0–20)
MICROALBUMIN/CREAT 24H UR: 9 MG/G CREATININE (ref 0–30)
MONOCYTES # BLD AUTO: 0.53 THOUSAND/ΜL (ref 0.17–1.22)
MONOCYTES NFR BLD AUTO: 9 % (ref 4–12)
NEUTROPHILS # BLD AUTO: 3.17 THOUSANDS/ΜL (ref 1.85–7.62)
NEUTS SEG NFR BLD AUTO: 53 % (ref 43–75)
NONHDLC SERPL-MCNC: 86 MG/DL
NRBC BLD AUTO-RTO: 0 /100 WBCS
PLATELET # BLD AUTO: 202 THOUSANDS/UL (ref 149–390)
PMV BLD AUTO: 9.5 FL (ref 8.9–12.7)
POTASSIUM SERPL-SCNC: 4.5 MMOL/L (ref 3.5–5.3)
PROT SERPL-MCNC: 7.9 G/DL (ref 6.4–8.2)
RBC # BLD AUTO: 4.31 MILLION/UL (ref 3.88–5.62)
SODIUM SERPL-SCNC: 140 MMOL/L (ref 136–145)
TRIGL SERPL-MCNC: 77 MG/DL
TSH SERPL DL<=0.05 MIU/L-ACNC: 2.19 UIU/ML (ref 0.36–3.74)
WBC # BLD AUTO: 6.01 THOUSAND/UL (ref 4.31–10.16)

## 2021-07-01 PROCEDURE — 36415 COLL VENOUS BLD VENIPUNCTURE: CPT

## 2021-07-01 PROCEDURE — 84443 ASSAY THYROID STIM HORMONE: CPT

## 2021-07-01 PROCEDURE — 3061F NEG MICROALBUMINURIA REV: CPT | Performed by: FAMILY MEDICINE

## 2021-07-01 PROCEDURE — 80061 LIPID PANEL: CPT

## 2021-07-01 PROCEDURE — 82570 ASSAY OF URINE CREATININE: CPT | Performed by: FAMILY MEDICINE

## 2021-07-01 PROCEDURE — 85025 COMPLETE CBC W/AUTO DIFF WBC: CPT

## 2021-07-01 PROCEDURE — 82043 UR ALBUMIN QUANTITATIVE: CPT | Performed by: FAMILY MEDICINE

## 2021-07-01 PROCEDURE — 80053 COMPREHEN METABOLIC PANEL: CPT

## 2021-07-15 ENCOUNTER — TELEPHONE (OUTPATIENT)
Dept: FAMILY MEDICINE CLINIC | Facility: CLINIC | Age: 61
End: 2021-07-15

## 2021-07-15 NOTE — TELEPHONE ENCOUNTER
Patient called requesting letter stating that daughter (who lives with him) does not have to return to office setting due to his high risk health issues  She has been working from home and will soon need to go back to office  Please advise  Thank you

## 2021-07-16 NOTE — TELEPHONE ENCOUNTER
Spoke with Marci Curry , does not look like his daughter is a pt in our office  He will check with her and get back to us

## 2021-07-16 NOTE — TELEPHONE ENCOUNTER
I can only do letter if the patient's  daughter is a patient of mine    The patient's daughter  would need to get a letter from her physician

## 2021-08-20 ENCOUNTER — VBI (OUTPATIENT)
Dept: ADMINISTRATIVE | Facility: OTHER | Age: 61
End: 2021-08-20

## 2021-11-02 ENCOUNTER — VBI (OUTPATIENT)
Dept: ADMINISTRATIVE | Facility: OTHER | Age: 61
End: 2021-11-02

## 2021-11-29 NOTE — RESULT NOTES
Message   Please call patient to inform him that his ABIs were normal  Thanks  NET     Verified Results  VAS ADI & WAVEFORM ANALYSIS, MULTIPLE LEVELS [id number] 02:41PM Angella Kellogg Order Number: [id number]    - Patient Instructions: To schedule this appointment, please contact Central Scheduling at [phone]  Test Name Result Flag Reference   VAS ADI & WAVEFORM ANALYSIS, MULTIPLE LEVELS (Report)     [facility] REPORT   CLINICAL:   Indications:   Bruit, Pulse Abnormality [R09 89]  Patient presents with cold feet which has   gotten worse recently  He denies any claudication symptoms  Operative History   Patient denies any cardiovascular surgeries   Risk Factors: The patient has history of NIDDM (oral meds), hyperlipidemia and smoking   (current) 1 ppd  FINDINGS:      Segment    Rig       Left                P W      P W        Ant  Tibial  131 Triphasic 147 Triphasic    Post  Tibial 156 Triphasic 154 Triphasic    Ankle     156 Triphasic 154 Triphasic    Metatarsal  175       155          Great Toe   106       124                   CONCLUSION:   Impression   RIGHT LOWER LIMB   Ankle Pressure: 156mmHg, ADI: 1 28 which is in the normal range   PVR Tracings are normal with presence of dicrotic notch  Right Metatarsal Pressure 175mmHg   Great Toe Pressure: 106mmHg, within limits for healing potential   LEFT LOWER LIMB   Ankle Pressure: 154mmHg, ADI: 1 26 which is in the normal range   PVR Tracings are normal with presence of dicrotic notch     Left Metatarsal Pressure 155mmHg   Great Toe Pressure: 124mmHg, within limits for healing potential       SIGNATURE:   Electronically Signed by: Shannan James MD, RPVI on 2016-12-16 10:52:35 PM       Signatures   Electronically signed by : JOSE Smith ; Dec 20 2016  1:50PM EST                       (Author) operating room

## 2022-01-13 ENCOUNTER — VBI (OUTPATIENT)
Dept: ADMINISTRATIVE | Facility: OTHER | Age: 62
End: 2022-01-13

## 2022-04-11 DIAGNOSIS — E11.9 TYPE 2 DIABETES MELLITUS WITHOUT COMPLICATION, UNSPECIFIED WHETHER LONG TERM INSULIN USE (HCC): ICD-10-CM

## 2022-04-11 RX ORDER — NIACIN 1000 MG/1
1000 TABLET, EXTENDED RELEASE ORAL
Qty: 90 TABLET | Refills: 1 | Status: SHIPPED | OUTPATIENT
Start: 2022-04-11

## 2022-05-12 ENCOUNTER — VBI (OUTPATIENT)
Dept: ADMINISTRATIVE | Facility: OTHER | Age: 62
End: 2022-05-12

## 2022-06-19 DIAGNOSIS — E11.9 TYPE 2 DIABETES MELLITUS WITHOUT COMPLICATION, UNSPECIFIED WHETHER LONG TERM INSULIN USE (HCC): ICD-10-CM

## 2022-06-20 RX ORDER — ROSUVASTATIN CALCIUM 40 MG/1
40 TABLET, COATED ORAL DAILY
Qty: 90 TABLET | Refills: 1 | Status: SHIPPED | OUTPATIENT
Start: 2022-06-20 | End: 2022-06-24 | Stop reason: SDUPTHER

## 2022-06-24 DIAGNOSIS — Z95.5 S/P CORONARY ARTERY STENT PLACEMENT: ICD-10-CM

## 2022-06-24 DIAGNOSIS — E11.9 TYPE 2 DIABETES MELLITUS WITHOUT COMPLICATION, UNSPECIFIED WHETHER LONG TERM INSULIN USE (HCC): ICD-10-CM

## 2022-06-24 DIAGNOSIS — I25.10 CORONARY ARTERY DISEASE INVOLVING NATIVE CORONARY ARTERY OF NATIVE HEART WITHOUT ANGINA PECTORIS: ICD-10-CM

## 2022-06-24 DIAGNOSIS — J40 BRONCHITIS: ICD-10-CM

## 2022-06-24 DIAGNOSIS — I21.4 NSTEMI (NON-ST ELEVATED MYOCARDIAL INFARCTION) (HCC): ICD-10-CM

## 2022-06-24 RX ORDER — METOPROLOL TARTRATE 50 MG/1
50 TABLET, FILM COATED ORAL EVERY 12 HOURS SCHEDULED
Qty: 30 TABLET | Refills: 0 | Status: SHIPPED | OUTPATIENT
Start: 2022-06-24 | End: 2022-07-18 | Stop reason: SDUPTHER

## 2022-06-24 RX ORDER — ROSUVASTATIN CALCIUM 40 MG/1
40 TABLET, COATED ORAL DAILY
Qty: 90 TABLET | Refills: 1 | Status: SHIPPED | OUTPATIENT
Start: 2022-06-24 | End: 2022-07-18 | Stop reason: SDUPTHER

## 2022-06-24 RX ORDER — EZETIMIBE 10 MG/1
10 TABLET ORAL DAILY
Qty: 15 TABLET | Refills: 0 | Status: SHIPPED | OUTPATIENT
Start: 2022-06-24 | End: 2022-07-18 | Stop reason: SDUPTHER

## 2022-06-24 NOTE — TELEPHONE ENCOUNTER
Please review and send, as the patient is out of medication  He has an appt  With you in the beginning of July

## 2022-07-18 ENCOUNTER — OFFICE VISIT (OUTPATIENT)
Dept: FAMILY MEDICINE CLINIC | Facility: CLINIC | Age: 62
End: 2022-07-18

## 2022-07-18 VITALS
DIASTOLIC BLOOD PRESSURE: 84 MMHG | OXYGEN SATURATION: 97 % | TEMPERATURE: 98.4 F | WEIGHT: 215.4 LBS | BODY MASS INDEX: 29.17 KG/M2 | SYSTOLIC BLOOD PRESSURE: 124 MMHG | HEART RATE: 67 BPM | HEIGHT: 72 IN

## 2022-07-18 DIAGNOSIS — I10 PRIMARY HYPERTENSION: Primary | ICD-10-CM

## 2022-07-18 DIAGNOSIS — N52.01 ERECTILE DYSFUNCTION DUE TO ARTERIAL INSUFFICIENCY: ICD-10-CM

## 2022-07-18 DIAGNOSIS — Z95.5 S/P CORONARY ARTERY STENT PLACEMENT: ICD-10-CM

## 2022-07-18 DIAGNOSIS — E11.9 TYPE 2 DIABETES MELLITUS WITHOUT COMPLICATION, UNSPECIFIED WHETHER LONG TERM INSULIN USE (HCC): ICD-10-CM

## 2022-07-18 DIAGNOSIS — Z88.9 H/O SEASONAL ALLERGIES: ICD-10-CM

## 2022-07-18 DIAGNOSIS — I21.4 NSTEMI (NON-ST ELEVATED MYOCARDIAL INFARCTION) (HCC): ICD-10-CM

## 2022-07-18 DIAGNOSIS — J40 BRONCHITIS: ICD-10-CM

## 2022-07-18 LAB — SL AMB POCT HEMOGLOBIN AIC: 5.6 (ref ?–6.5)

## 2022-07-18 PROCEDURE — 83036 HEMOGLOBIN GLYCOSYLATED A1C: CPT | Performed by: FAMILY MEDICINE

## 2022-07-18 PROCEDURE — 99213 OFFICE O/P EST LOW 20 MIN: CPT | Performed by: FAMILY MEDICINE

## 2022-07-18 RX ORDER — METOPROLOL TARTRATE 50 MG/1
50 TABLET, FILM COATED ORAL EVERY 12 HOURS SCHEDULED
Qty: 90 TABLET | Refills: 1 | Status: SHIPPED | OUTPATIENT
Start: 2022-07-18

## 2022-07-18 RX ORDER — SILDENAFIL 100 MG/1
100 TABLET, FILM COATED ORAL DAILY PRN
Qty: 30 TABLET | Refills: 3 | Status: SHIPPED | OUTPATIENT
Start: 2022-07-18

## 2022-07-18 RX ORDER — ROSUVASTATIN CALCIUM 40 MG/1
40 TABLET, COATED ORAL DAILY
Qty: 90 TABLET | Refills: 1 | Status: SHIPPED | OUTPATIENT
Start: 2022-07-18

## 2022-07-18 RX ORDER — EZETIMIBE 10 MG/1
10 TABLET ORAL DAILY
Qty: 90 TABLET | Refills: 1 | Status: SHIPPED | OUTPATIENT
Start: 2022-07-18

## 2022-07-18 RX ORDER — LOSARTAN POTASSIUM 25 MG/1
12.5 TABLET ORAL DAILY
Qty: 45 TABLET | Refills: 1 | Status: SHIPPED | OUTPATIENT
Start: 2022-07-18

## 2022-07-18 RX ORDER — LORATADINE 10 MG/1
10 TABLET ORAL DAILY
Qty: 90 TABLET | Refills: 1 | Status: SHIPPED | OUTPATIENT
Start: 2022-07-18

## 2022-07-18 NOTE — PROGRESS NOTES
Assessment/Plan:A1c 5 6  patient without insurance presently  Refills given  Diagnoses and all orders for this visit:    Primary hypertension    H/O seasonal allergies  -     loratadine (CLARITIN) 10 mg tablet; Take 1 tablet (10 mg total) by mouth daily    Type 2 diabetes mellitus without complication, unspecified whether long term insulin use (HCC)  -     ezetimibe (ZETIA) 10 mg tablet; Take 1 tablet (10 mg total) by mouth daily  -     metFORMIN (GLUCOPHAGE) 1000 MG tablet; Take 1 tablet (1,000 mg total) by mouth 2 (two) times a day  -     rosuvastatin (CRESTOR) 40 MG tablet; Take 1 tablet (40 mg total) by mouth daily  -     POCT hemoglobin A1c    S/P coronary artery stent placement  -     losartan (COZAAR) 25 mg tablet; Take 0 5 tablets (12 5 mg total) by mouth daily    Bronchitis  -     metFORMIN (GLUCOPHAGE) 1000 MG tablet; Take 1 tablet (1,000 mg total) by mouth 2 (two) times a day    NSTEMI (non-ST elevated myocardial infarction) ([facility])  -     metoprolol tartrate (LOPRESSOR) 50 mg tablet; Take 1 tablet (50 mg total) by mouth every 12 (twelve) hours    Erectile dysfunction due to arterial insufficiency  -     sildenafil (VIAGRA) 100 mg tablet; Take 1 tablet (100 mg total) by mouth daily as needed for erectile dysfunction            Subjective:        Patient ID: Corrine Crigler is a 60 y o  male  HPI      The following portions of the patient's history were reviewed and updated as appropriate: allergies, current medications, past family history, past medical history, past social history, past surgical history and problem list       Review of Systems   Constitutional: Negative  HENT: Negative  Eyes: Negative  Respiratory: Negative  Cardiovascular: Negative  Gastrointestinal: Negative  Endocrine: Negative  Genitourinary: Negative  Musculoskeletal: Negative  Skin: Negative  Allergic/Immunologic: Negative  Neurological: Negative  Hematological: Negative  Psychiatric/Behavioral: Negative  Objective:      BMI Counseling: Body mass index is 29 21 kg/m²  The BMI is above normal  Nutrition recommendations include consuming healthier snacks  Exercise recommendations include exercising 3-5 times per week  Rationale for BMI follow-up plan is due to patient being overweight or obese  Depression Screening and Follow-up Plan: Patient was screened for depression during today's encounter  They screened negative with a PHQ-2 score of 0             /84 (BP Location: Right arm, Patient Position: Sitting, Cuff Size: Standard)   Pulse 67   Temp 98 4 °F (36 9 °C) (Temporal)   Ht 6' (1 829 m)   Wt 97 7 kg (215 lb 6 4 oz)   SpO2 97%   BMI 29 21 kg/m²          Physical Exam  Vitals and nursing note reviewed  Constitutional:       General: He is not in acute distress  Appearance: Normal appearance  He is not ill-appearing, toxic-appearing or diaphoretic  HENT:      Head: Normocephalic and atraumatic  Right Ear: Tympanic membrane, ear canal and external ear normal  There is no impacted cerumen  Left Ear: Tympanic membrane, ear canal and external ear normal  There is no impacted cerumen  Nose: Nose normal  No congestion or rhinorrhea  Mouth/Throat:      Mouth: Mucous membranes are moist       Pharynx: No oropharyngeal exudate or posterior oropharyngeal erythema  Eyes:      General: No scleral icterus  Right eye: No discharge  Left eye: No discharge  Extraocular Movements: Extraocular movements intact  Conjunctiva/sclera: Conjunctivae normal       Pupils: Pupils are equal, round, and reactive to light  Neck:      Vascular: No carotid bruit  Cardiovascular:      Rate and Rhythm: Normal rate and regular rhythm  Pulses: Normal pulses  Heart sounds: Normal heart sounds  No murmur heard  No friction rub  No gallop  Pulmonary:      Effort: Pulmonary effort is normal  No respiratory distress  Breath sounds: Normal breath sounds  No stridor  No wheezing, rhonchi or rales  Chest:      Chest wall: No tenderness  Musculoskeletal:         General: No swelling, tenderness, deformity or signs of injury  Normal range of motion  Cervical back: Normal range of motion and neck supple  No rigidity  No muscular tenderness  Right lower leg: No edema  Left lower leg: No edema  Lymphadenopathy:      Cervical: No cervical adenopathy  Skin:     General: Skin is warm and dry  Capillary Refill: Capillary refill takes less than 2 seconds  Coloration: Skin is not jaundiced  Findings: No bruising, erythema, lesion or rash  Neurological:      Mental Status: He is alert and oriented to person, place, and time  Mental status is at baseline  Cranial Nerves: No cranial nerve deficit  Sensory: No sensory deficit  Motor: No weakness  Coordination: Coordination normal       Gait: Gait normal    Psychiatric:         Mood and Affect: Mood normal          Behavior: Behavior normal          Thought Content:  Thought content normal          Judgment: Judgment normal

## 2022-11-08 ENCOUNTER — TELEPHONE (OUTPATIENT)
Dept: FAMILY MEDICINE CLINIC | Facility: CLINIC | Age: 62
End: 2022-11-08

## 2022-11-08 NOTE — TELEPHONE ENCOUNTER
Patient called and states he is not feeling  States he has a cough congestion and feels horrible  Patient is requesting to be seen tomorrow but at the moment we have no available appointment  I advised patient he could call tomorrow in the am to see if we have cancellations or he can go to his nearest  urgent care for evaluation

## 2022-11-09 ENCOUNTER — OFFICE VISIT (OUTPATIENT)
Dept: FAMILY MEDICINE CLINIC | Facility: CLINIC | Age: 62
End: 2022-11-09

## 2022-11-09 DIAGNOSIS — J01.00 ACUTE NON-RECURRENT MAXILLARY SINUSITIS: Primary | ICD-10-CM

## 2022-11-09 RX ORDER — LEVOFLOXACIN 500 MG/1
500 TABLET, FILM COATED ORAL EVERY 24 HOURS
Qty: 7 TABLET | Refills: 0 | Status: SHIPPED | OUTPATIENT
Start: 2022-11-09 | End: 2022-11-16

## 2022-11-09 RX ORDER — GUAIFENESIN AND CODEINE PHOSPHATE 100; 10 MG/5ML; MG/5ML
5 SOLUTION ORAL 3 TIMES DAILY PRN
Qty: 120 ML | Refills: 1 | Status: SHIPPED | OUTPATIENT
Start: 2022-11-09

## 2022-11-09 NOTE — PROGRESS NOTES
Assessment/Plan:       Diagnoses and all orders for this visit:    Acute non-recurrent maxillary sinusitis  -     levofloxacin (LEVAQUIN) 500 mg tablet; Take 1 tablet (500 mg total) by mouth every 24 hours for 7 days  -     guaifenesin-codeine (GUAIFENESIN AC) 100-10 MG/5ML liquid; Take 5 mL by mouth 3 (three) times a day as needed for cough            Subjective:        Patient ID: Morenita Montana is a 60 y o  male  Patient is here with  Sore throat ear pain cough over the past 2 days  No vomiting or diarrhea noted  Patient with nasal congestion  No significant rhinorrhea  Patient does have chest pain associated with coughing  Patient is using Tylenol  Patient did use NyQuil  The following portions of the patient's history were reviewed and updated as appropriate: allergies, current medications, past family history, past medical history, past social history, past surgical history and problem list       Review of Systems   Constitutional: Negative  HENT: Positive for congestion and ear pain  Eyes: Negative  Respiratory: Positive for cough and chest tightness  Cardiovascular: Negative  Gastrointestinal: Negative  Endocrine: Negative  Genitourinary: Negative  Musculoskeletal: Negative  Skin: Negative  Allergic/Immunologic: Negative  Neurological: Negative  Hematological: Negative  Psychiatric/Behavioral: Negative  Objective: There were no vitals taken for this visit  Physical Exam  Vitals and nursing note reviewed  Constitutional:       General: He is not in acute distress  Appearance: Normal appearance  He is not ill-appearing, toxic-appearing or diaphoretic  HENT:      Head: Normocephalic and atraumatic  Right Ear: Tympanic membrane, ear canal and external ear normal  There is no impacted cerumen  Left Ear: Tympanic membrane, ear canal and external ear normal  There is no impacted cerumen        Nose: Nose normal  No congestion or rhinorrhea  Mouth/Throat:      Mouth: Mucous membranes are moist       Pharynx: Oropharyngeal exudate and posterior oropharyngeal erythema present  Eyes:      General: No scleral icterus  Right eye: No discharge  Left eye: No discharge  Extraocular Movements: Extraocular movements intact  Conjunctiva/sclera: Conjunctivae normal       Pupils: Pupils are equal, round, and reactive to light  Neck:      Vascular: No carotid bruit  Cardiovascular:      Rate and Rhythm: Normal rate and regular rhythm  Pulses: Normal pulses  Heart sounds: Normal heart sounds  No murmur heard  No friction rub  No gallop  Pulmonary:      Effort: Pulmonary effort is normal  No respiratory distress  Breath sounds: No stridor  Rhonchi present  No wheezing or rales  Comments: Scattered rhonchi bilaterall  Chest:      Chest wall: No tenderness  Musculoskeletal:         General: No swelling, tenderness, deformity or signs of injury  Normal range of motion  Cervical back: Normal range of motion and neck supple  No rigidity  No muscular tenderness  Right lower leg: No edema  Left lower leg: No edema  Lymphadenopathy:      Cervical: No cervical adenopathy  Skin:     General: Skin is warm and dry  Capillary Refill: Capillary refill takes less than 2 seconds  Coloration: Skin is not jaundiced  Findings: No bruising, erythema, lesion or rash  Neurological:      Mental Status: He is alert and oriented to person, place, and time  Cranial Nerves: No cranial nerve deficit  Sensory: No sensory deficit  Motor: No weakness  Coordination: Coordination normal       Gait: Gait normal    Psychiatric:         Mood and Affect: Mood normal          Behavior: Behavior normal          Thought Content:  Thought content normal          Judgment: Judgment normal

## 2023-02-10 ENCOUNTER — OFFICE VISIT (OUTPATIENT)
Dept: FAMILY MEDICINE CLINIC | Facility: CLINIC | Age: 63
End: 2023-02-10

## 2023-02-10 VITALS
WEIGHT: 233 LBS | BODY MASS INDEX: 30.88 KG/M2 | DIASTOLIC BLOOD PRESSURE: 78 MMHG | TEMPERATURE: 97.1 F | SYSTOLIC BLOOD PRESSURE: 140 MMHG | OXYGEN SATURATION: 96 % | HEART RATE: 110 BPM | HEIGHT: 73 IN

## 2023-02-10 DIAGNOSIS — J40 BRONCHITIS: Primary | ICD-10-CM

## 2023-02-10 DIAGNOSIS — E11.00 TYPE 2 DIABETES MELLITUS WITH HYPEROSMOLARITY WITHOUT COMA, WITHOUT LONG-TERM CURRENT USE OF INSULIN (HCC): ICD-10-CM

## 2023-02-10 PROBLEM — J20.8 ACUTE BRONCHITIS DUE TO OTHER SPECIFIED ORGANISMS: Status: ACTIVE | Noted: 2023-02-10

## 2023-02-10 RX ORDER — CEFDINIR 300 MG/1
300 CAPSULE ORAL EVERY 12 HOURS SCHEDULED
Qty: 14 CAPSULE | Refills: 0 | Status: SHIPPED | OUTPATIENT
Start: 2023-02-10 | End: 2023-02-17

## 2023-02-10 RX ORDER — GUAIFENESIN/DEXTROMETHORPHAN 100-10MG/5
5 SYRUP ORAL 3 TIMES DAILY PRN
Qty: 473 ML | Refills: 1 | Status: SHIPPED | OUTPATIENT
Start: 2023-02-10

## 2023-02-14 NOTE — PROGRESS NOTES
Assessment/Plan:    28-year-old male with: Bronchitis type 2 diabetes we will give antibiotic along with cough syrup discussed with care return parameters advised patient to call back if not improving or worsening    No problem-specific Assessment & Plan notes found for this encounter  Diagnoses and all orders for this visit:    Bronchitis  -     cefdinir (OMNICEF) 300 mg capsule; Take 1 capsule (300 mg total) by mouth every 12 (twelve) hours for 7 days  -     dextromethorphan-guaiFENesin (ROBITUSSIN DM)  mg/5 mL syrup; Take 5 mL by mouth 3 (three) times a day as needed for cough    Type 2 diabetes mellitus with hyperosmolarity without coma, without long-term current use of insulin (HCC)          Subjective:     Chief Complaint   Patient presents with   • Sore Throat        Patient ID: Nicolasa Mcdonald is a 58 y o  male  Patient is a 28-year-old male who presents for follow-up on bronchitis symptoms along with type 2 diabetes he is having cough congestion no fevers chills nausea vomiting no other complaints at this time    Sore Throat   Associated symptoms include congestion and coughing  The following portions of the patient's history were reviewed and updated as appropriate: allergies, current medications, past family history, past medical history, past social history, past surgical history and problem list     Review of Systems   Constitutional: Negative  HENT: Positive for congestion and sore throat  Eyes: Negative  Respiratory: Positive for cough  Cardiovascular: Negative  Gastrointestinal: Negative  Endocrine: Negative  Genitourinary: Negative  Musculoskeletal: Negative  Allergic/Immunologic: Negative  Neurological: Negative  Hematological: Negative  Psychiatric/Behavioral: Negative  All other systems reviewed and are negative          Objective:      /78 (BP Location: Right arm, Patient Position: Sitting, Cuff Size: Standard)   Pulse (!) 110 Temp (!) 97 1 °F (36 2 °C) (Tympanic)   Ht 6' 1" (1 854 m)   Wt 106 kg (233 lb)   SpO2 96%   BMI 30 74 kg/m²          Physical Exam  Constitutional:       Appearance: He is well-developed  HENT:      Head: Atraumatic  Right Ear: External ear normal       Left Ear: External ear normal    Eyes:      Conjunctiva/sclera: Conjunctivae normal       Pupils: Pupils are equal, round, and reactive to light  Cardiovascular:      Rate and Rhythm: Normal rate and regular rhythm  Heart sounds: Normal heart sounds  Pulmonary:      Effort: Pulmonary effort is normal  No respiratory distress  Breath sounds: Normal breath sounds  Abdominal:      General: Bowel sounds are normal  There is no distension  Palpations: Abdomen is soft  Tenderness: There is no abdominal tenderness  There is no guarding or rebound  Musculoskeletal:         General: Normal range of motion  Cervical back: Normal range of motion  Skin:     General: Skin is warm and dry  Neurological:      Mental Status: He is alert and oriented to person, place, and time  Cranial Nerves: No cranial nerve deficit  Psychiatric:         Behavior: Behavior normal          Thought Content:  Thought content normal          Judgment: Judgment normal

## 2023-02-14 NOTE — PATIENT INSTRUCTIONS

## 2023-04-11 PROBLEM — J20.8 ACUTE BRONCHITIS DUE TO OTHER SPECIFIED ORGANISMS: Status: RESOLVED | Noted: 2023-02-10 | Resolved: 2023-04-11

## 2023-06-03 NOTE — ADDENDUM NOTE
Addended by: Christophe Motley on: 9/18/2019 01:16 PM     Modules accepted: Orders Normal rate, regular rhythm.  Heart sounds S1, S2.

## 2023-06-27 DIAGNOSIS — I21.4 NSTEMI (NON-ST ELEVATED MYOCARDIAL INFARCTION) (HCC): ICD-10-CM

## 2023-06-27 RX ORDER — METOPROLOL TARTRATE 50 MG/1
50 TABLET, FILM COATED ORAL EVERY 12 HOURS SCHEDULED
Qty: 90 TABLET | Refills: 1 | Status: SHIPPED | OUTPATIENT
Start: 2023-06-27

## 2023-08-08 DIAGNOSIS — Z95.5 S/P CORONARY ARTERY STENT PLACEMENT: ICD-10-CM

## 2023-08-08 DIAGNOSIS — J40 BRONCHITIS: ICD-10-CM

## 2023-08-08 DIAGNOSIS — E11.9 TYPE 2 DIABETES MELLITUS WITHOUT COMPLICATION, UNSPECIFIED WHETHER LONG TERM INSULIN USE (HCC): ICD-10-CM

## 2023-08-08 RX ORDER — ROSUVASTATIN CALCIUM 40 MG/1
40 TABLET, COATED ORAL DAILY
Qty: 90 TABLET | Refills: 1 | Status: SHIPPED | OUTPATIENT
Start: 2023-08-08 | End: 2023-09-06 | Stop reason: SDUPTHER

## 2023-08-08 RX ORDER — EZETIMIBE 10 MG/1
10 TABLET ORAL DAILY
Qty: 90 TABLET | Refills: 1 | Status: SHIPPED | OUTPATIENT
Start: 2023-08-08 | End: 2023-09-06 | Stop reason: SDUPTHER

## 2023-08-08 RX ORDER — LOSARTAN POTASSIUM 25 MG/1
12.5 TABLET ORAL DAILY
Qty: 45 TABLET | Refills: 1 | Status: SHIPPED | OUTPATIENT
Start: 2023-08-08 | End: 2023-09-06 | Stop reason: SDUPTHER

## 2023-09-06 ENCOUNTER — OFFICE VISIT (OUTPATIENT)
Dept: FAMILY MEDICINE CLINIC | Facility: CLINIC | Age: 63
End: 2023-09-06
Payer: COMMERCIAL

## 2023-09-06 ENCOUNTER — APPOINTMENT (EMERGENCY)
Dept: RADIOLOGY | Facility: HOSPITAL | Age: 63
End: 2023-09-06

## 2023-09-06 ENCOUNTER — HOSPITAL ENCOUNTER (EMERGENCY)
Facility: HOSPITAL | Age: 63
Discharge: HOME | End: 2023-09-06
Attending: STUDENT IN AN ORGANIZED HEALTH CARE EDUCATION/TRAINING PROGRAM

## 2023-09-06 VITALS
BODY MASS INDEX: 32.61 KG/M2 | WEIGHT: 247.14 LBS | SYSTOLIC BLOOD PRESSURE: 136 MMHG | OXYGEN SATURATION: 95 % | HEART RATE: 76 BPM | TEMPERATURE: 98.2 F | RESPIRATION RATE: 20 BRPM | DIASTOLIC BLOOD PRESSURE: 70 MMHG

## 2023-09-06 VITALS
BODY MASS INDEX: 31.99 KG/M2 | OXYGEN SATURATION: 99 % | DIASTOLIC BLOOD PRESSURE: 84 MMHG | HEART RATE: 82 BPM | WEIGHT: 241.4 LBS | TEMPERATURE: 97.8 F | SYSTOLIC BLOOD PRESSURE: 122 MMHG | HEIGHT: 73 IN

## 2023-09-06 DIAGNOSIS — R07.2 PRECORDIAL PAIN: Primary | ICD-10-CM

## 2023-09-06 DIAGNOSIS — R07.9 CHEST PAIN: ICD-10-CM

## 2023-09-06 DIAGNOSIS — Z95.5 S/P CORONARY ARTERY STENT PLACEMENT: ICD-10-CM

## 2023-09-06 DIAGNOSIS — E11.9 TYPE 2 DIABETES MELLITUS WITHOUT COMPLICATION, UNSPECIFIED WHETHER LONG TERM INSULIN USE (HCC): ICD-10-CM

## 2023-09-06 DIAGNOSIS — R20.2 PARESTHESIAS: Primary | ICD-10-CM

## 2023-09-06 DIAGNOSIS — I21.4 NSTEMI (NON-ST ELEVATED MYOCARDIAL INFARCTION) (HCC): ICD-10-CM

## 2023-09-06 DIAGNOSIS — R07.9 CHEST PAIN, UNSPECIFIED TYPE: ICD-10-CM

## 2023-09-06 DIAGNOSIS — J40 BRONCHITIS: ICD-10-CM

## 2023-09-06 DIAGNOSIS — I25.10 CORONARY ARTERY DISEASE INVOLVING NATIVE CORONARY ARTERY OF NATIVE HEART WITHOUT ANGINA PECTORIS: ICD-10-CM

## 2023-09-06 DIAGNOSIS — Z88.9 H/O SEASONAL ALLERGIES: ICD-10-CM

## 2023-09-06 DIAGNOSIS — E78.2 MIXED HYPERLIPIDEMIA: ICD-10-CM

## 2023-09-06 LAB
2HR DELTA HS TROPONIN: 0 NG/L
ALBUMIN SERPL BCP-MCNC: 4.5 G/DL (ref 3.5–5)
ALP SERPL-CCNC: 66 U/L (ref 34–104)
ALT SERPL W P-5'-P-CCNC: 22 U/L (ref 7–52)
ANION GAP SERPL CALCULATED.3IONS-SCNC: 8 MMOL/L
APTT PPP: 39 SECONDS (ref 23–37)
AST SERPL W P-5'-P-CCNC: 18 U/L (ref 13–39)
ATRIAL RATE: 75 BPM
ATRIAL RATE: 79 BPM
BASOPHILS # BLD AUTO: 0.07 THOUSANDS/ÂΜL (ref 0–0.1)
BASOPHILS NFR BLD AUTO: 1 % (ref 0–1)
BILIRUB SERPL-MCNC: 0.84 MG/DL (ref 0.2–1)
BUN SERPL-MCNC: 16 MG/DL (ref 5–25)
CALCIUM SERPL-MCNC: 9.7 MG/DL (ref 8.4–10.2)
CARDIAC TROPONIN I PNL SERPL HS: 2 NG/L
CARDIAC TROPONIN I PNL SERPL HS: 2 NG/L
CHLORIDE SERPL-SCNC: 102 MMOL/L (ref 96–108)
CO2 SERPL-SCNC: 28 MMOL/L (ref 21–32)
CREAT SERPL-MCNC: 0.94 MG/DL (ref 0.6–1.3)
EOSINOPHIL # BLD AUTO: 0.32 THOUSAND/ÂΜL (ref 0–0.61)
EOSINOPHIL NFR BLD AUTO: 3 % (ref 0–6)
ERYTHROCYTE [DISTWIDTH] IN BLOOD BY AUTOMATED COUNT: 12 % (ref 11.6–15.1)
GFR SERPL CREATININE-BSD FRML MDRD: 85 ML/MIN/1.73SQ M
GLUCOSE SERPL-MCNC: 115 MG/DL (ref 65–140)
HCT VFR BLD AUTO: 42.5 % (ref 36.5–49.3)
HGB BLD-MCNC: 14.1 G/DL (ref 12–17)
IMM GRANULOCYTES # BLD AUTO: 0.06 THOUSAND/UL (ref 0–0.2)
IMM GRANULOCYTES NFR BLD AUTO: 1 % (ref 0–2)
INR PPP: 1.02 (ref 0.84–1.19)
LYMPHOCYTES # BLD AUTO: 2.71 THOUSANDS/ÂΜL (ref 0.6–4.47)
LYMPHOCYTES NFR BLD AUTO: 28 % (ref 14–44)
MCH RBC QN AUTO: 29.6 PG (ref 26.8–34.3)
MCHC RBC AUTO-ENTMCNC: 33.2 G/DL (ref 31.4–37.4)
MCV RBC AUTO: 89 FL (ref 82–98)
MONOCYTES # BLD AUTO: 0.76 THOUSAND/ÂΜL (ref 0.17–1.22)
MONOCYTES NFR BLD AUTO: 8 % (ref 4–12)
NEUTROPHILS # BLD AUTO: 5.64 THOUSANDS/ÂΜL (ref 1.85–7.62)
NEUTS SEG NFR BLD AUTO: 59 % (ref 43–75)
NRBC BLD AUTO-RTO: 0 /100 WBCS
P AXIS: 54 DEGREES
P AXIS: 60 DEGREES
PLATELET # BLD AUTO: 207 THOUSANDS/UL (ref 149–390)
PMV BLD AUTO: 9.1 FL (ref 8.9–12.7)
POTASSIUM SERPL-SCNC: 4.3 MMOL/L (ref 3.5–5.3)
PR INTERVAL: 148 MS
PR INTERVAL: 156 MS
PROT SERPL-MCNC: 8 G/DL (ref 6.4–8.4)
PROTHROMBIN TIME: 13.4 SECONDS (ref 11.6–14.5)
QRS AXIS: -13 DEGREES
QRS AXIS: -5 DEGREES
QRSD INTERVAL: 88 MS
QRSD INTERVAL: 88 MS
QT INTERVAL: 376 MS
QT INTERVAL: 386 MS
QTC INTERVAL: 431 MS
QTC INTERVAL: 431 MS
RBC # BLD AUTO: 4.76 MILLION/UL (ref 3.88–5.62)
SL AMB POCT HEMOGLOBIN AIC: 7.2 (ref ?–6.5)
SODIUM SERPL-SCNC: 138 MMOL/L (ref 135–147)
T WAVE AXIS: 26 DEGREES
T WAVE AXIS: 34 DEGREES
VENTRICULAR RATE: 75 BPM
VENTRICULAR RATE: 79 BPM
WBC # BLD AUTO: 9.56 THOUSAND/UL (ref 4.31–10.16)

## 2023-09-06 PROCEDURE — 93005 ELECTROCARDIOGRAM TRACING: CPT

## 2023-09-06 PROCEDURE — 93010 ELECTROCARDIOGRAM REPORT: CPT | Performed by: INTERNAL MEDICINE

## 2023-09-06 PROCEDURE — 99285 EMERGENCY DEPT VISIT HI MDM: CPT | Performed by: PHYSICIAN ASSISTANT

## 2023-09-06 PROCEDURE — 36415 COLL VENOUS BLD VENIPUNCTURE: CPT | Performed by: PHYSICIAN ASSISTANT

## 2023-09-06 PROCEDURE — 85025 COMPLETE CBC W/AUTO DIFF WBC: CPT | Performed by: PHYSICIAN ASSISTANT

## 2023-09-06 PROCEDURE — 99215 OFFICE O/P EST HI 40 MIN: CPT | Performed by: FAMILY MEDICINE

## 2023-09-06 PROCEDURE — 85730 THROMBOPLASTIN TIME PARTIAL: CPT | Performed by: PHYSICIAN ASSISTANT

## 2023-09-06 PROCEDURE — 85610 PROTHROMBIN TIME: CPT | Performed by: PHYSICIAN ASSISTANT

## 2023-09-06 PROCEDURE — 80053 COMPREHEN METABOLIC PANEL: CPT | Performed by: PHYSICIAN ASSISTANT

## 2023-09-06 PROCEDURE — 99285 EMERGENCY DEPT VISIT HI MDM: CPT

## 2023-09-06 PROCEDURE — 71046 X-RAY EXAM CHEST 2 VIEWS: CPT

## 2023-09-06 PROCEDURE — 83036 HEMOGLOBIN GLYCOSYLATED A1C: CPT | Performed by: FAMILY MEDICINE

## 2023-09-06 PROCEDURE — 84484 ASSAY OF TROPONIN QUANT: CPT | Performed by: PHYSICIAN ASSISTANT

## 2023-09-06 RX ORDER — EZETIMIBE 10 MG/1
10 TABLET ORAL DAILY
Qty: 90 TABLET | Refills: 1 | Status: SHIPPED | OUTPATIENT
Start: 2023-09-06

## 2023-09-06 RX ORDER — ROSUVASTATIN CALCIUM 40 MG/1
40 TABLET, COATED ORAL DAILY
Qty: 90 TABLET | Refills: 1 | Status: SHIPPED | OUTPATIENT
Start: 2023-09-06

## 2023-09-06 RX ORDER — LOSARTAN POTASSIUM 25 MG/1
12.5 TABLET ORAL DAILY
Qty: 45 TABLET | Refills: 1 | Status: SHIPPED | OUTPATIENT
Start: 2023-09-06

## 2023-09-06 RX ORDER — NITROGLYCERIN 0.4 MG/1
0.4 TABLET SUBLINGUAL
Qty: 30 TABLET | Refills: 0 | Status: SHIPPED | OUTPATIENT
Start: 2023-09-06

## 2023-09-06 RX ORDER — METOPROLOL TARTRATE 50 MG/1
50 TABLET, FILM COATED ORAL EVERY 12 HOURS SCHEDULED
Qty: 90 TABLET | Refills: 1 | Status: SHIPPED | OUTPATIENT
Start: 2023-09-06

## 2023-09-06 NOTE — ED PROVIDER NOTES
History  Chief Complaint   Patient presents with   • Numbness     Patient c/o bilateral leg and arm numbness and tinglings that began last night. Patient also reporting chest discomfort. Patient has hx of 2 stents in the heart, denies stroke hx.      79-year-old male to the emergency department complaints of upper and lower extremity numbness and tingling. States that 5 days ago he had an episode of numbness and tingling traveling up to both of his legs, and both of his arms, and across his chest associated with some chest discomfort. States that episode occurred while standing on a keyboard, and resolved within 1 minute. Notes that this morning he had a similar episode [place]. States that chest pressure was slightly more pronounced this time, but that overall symptoms lasted for approximately the same duration. He denies associated nausea, vomiting, or diaphoresis. No focal weakness in the upper or lower extremities. He denies changes in speech or vision during this time. Seen at his family doctor's office earlier today and sent to the emergency department for further evaluation. History of 1 previous MI with 2 stents ~4 years ago. History provided by:  Patient   used: No        Prior to Admission Medications   Prescriptions Last Dose Informant Patient Reported? Taking?    Omega-3 Fatty Acids (FISH OIL) 1200 MG CAPS  Self Yes No   Sig: Take by mouth   aspirin (ECOTRIN LOW STRENGTH) 81 mg EC tablet  Self No No   Sig: Take 1 tablet (81 mg total) by mouth daily   dextromethorphan-guaiFENesin (ROBITUSSIN DM)  mg/5 mL syrup   No No   Sig: Take 5 mL by mouth 3 (three) times a day as needed for cough   ezetimibe (ZETIA) 10 mg tablet   No No   Sig: Take 1 tablet (10 mg total) by mouth daily   guaifenesin-codeine (GUAIFENESIN AC) 100-10 MG/5ML liquid   No No   Sig: Take 5 mL by mouth 3 (three) times a day as needed for cough   loratadine (CLARITIN) 10 mg tablet No No   Sig: Take 1 tablet (10 mg total) by mouth daily   losartan (COZAAR) 25 mg tablet   No No   Sig: Take 0.5 tablets (12.5 mg total) by mouth daily   metFORMIN (GLUCOPHAGE) 1000 MG tablet   No No   Sig: Take 1 tablet (1,000 mg total) by mouth 2 (two) times a day   metoprolol tartrate (LOPRESSOR) 50 mg tablet   No No   Sig: Take 1 tablet (50 mg total) by mouth every 12 (twelve) hours   multivitamin (THERAGRAN) TABS  Self Yes No   Sig: Take 1 tablet by mouth daily   niacin (NIASPAN) 1000 MG CR tablet   No No   Sig: TAKE 1 TABLET (1,000 MG TOTAL) BY MOUTH DAILY AT BEDTIME   nitroglycerin (NITROSTAT) 0.4 mg SL tablet   No No   Sig: Place 1 tablet (0.4 mg total) under the tongue every 5 (five) minutes as needed for chest pain   rivaroxaban (XARELTO) 2.5 mg tablet   No No   Sig: Take 1 tablet (2.5 mg total) by mouth 2 (two) times a day   rosuvastatin (CRESTOR) 40 MG tablet   No No   Sig: Take 1 tablet (40 mg total) by mouth daily   sildenafil (VIAGRA) 100 mg tablet   No No   Sig: Take 1 tablet (100 mg total) by mouth daily as needed for erectile dysfunction   Patient not taking: Reported on 11/9/2022      Facility-Administered Medications: None       Past Medical History:   Diagnosis Date   • Allergic    • Asthma    • Coronary artery disease    • Diabetes (HCC)    • HTN (hypertension)    • Hypercholesteremia    • Hypertension    • Myocardial infarction [facility])    • Obesity        Past Surgical History:   Procedure Laterality Date   • CARDIAC CATHETERIZATION     • COLONOSCOPY      RESOLVED: 2003   • CORONARY STENT PLACEMENT     • MASS EXCISION Right     post auricular cyst removal - in office - Dr. Maritza Ross - 6/10/19   • TONSILLECTOMY         Family History   Problem Relation Age of Onset   • Liver cancer Father    • Skin cancer Father    • Tremor Mother    • Tremor Maternal Grandmother      I have reviewed and agree with the history as documented.     E-Cigarette/Vaping   • E-Cigarette Use Never User E-Cigarette/Vaping Substances   • Nicotine No    • THC No    • CBD No    • Flavoring No    • Other No    • Unknown No      Social History     Tobacco Use   • Smoking status: Former     Packs/day: 1.00     Years: 40.00     Total pack years: 40.00     Types: Cigarettes     Quit date: 2019     Years since quittin.0   • Smokeless tobacco: Never   • Tobacco comments:     he has quit several times before   Vaping Use   • Vaping Use: Never used   Substance Use Topics   • Alcohol use: Never   • Drug use: Never       Review of Systems   Constitutional: Negative for activity change, appetite change, chills and fever. HENT: Negative for congestion, dental problem, drooling, ear discharge, ear pain, mouth sores, nosebleeds, rhinorrhea, sore throat and trouble swallowing. Eyes: Negative for pain, discharge and itching. Respiratory: Negative for cough, chest tightness, shortness of breath and wheezing. Cardiovascular: Positive for chest pain. Negative for palpitations. Gastrointestinal: Negative for abdominal pain, blood in stool, constipation, diarrhea, nausea and vomiting. Endocrine: Negative for cold intolerance and heat intolerance. Genitourinary: Negative for difficulty urinating, dysuria, flank pain, frequency and urgency. Skin: Negative for rash and wound. Allergic/Immunologic: Negative for food allergies and immunocompromised state. Neurological: Positive for numbness. Negative for dizziness, seizures, syncope, weakness and headaches. Psychiatric/Behavioral: Negative for agitation, behavioral problems and confusion. Physical Exam  Physical Exam  Vitals and nursing note reviewed. Constitutional:       General: He is not in acute distress. Appearance: He is not diaphoretic. HENT:      Head: Normocephalic and atraumatic.       Right Ear: External ear normal.      Left Ear: External ear normal.      Mouth/Throat:      Mouth: Mucous membranes are moist.   Eyes: Conjunctiva/sclera: Conjunctivae normal.   Neck:      Vascular: No JVD. Trachea: No tracheal deviation. Cardiovascular:      Rate and Rhythm: Normal rate and regular rhythm. Heart sounds: Normal heart sounds. No murmur heard. No friction rub. No gallop. Pulmonary:      Effort: Pulmonary effort is normal. No respiratory distress. Breath sounds: Normal breath sounds. No wheezing or rales. Chest:      Chest wall: No tenderness. Abdominal:      General: Bowel sounds are normal. There is no distension. Palpations: Abdomen is soft. Tenderness: There is no abdominal tenderness. There is no guarding. Musculoskeletal:         General: No tenderness or deformity. Normal range of motion. Lymphadenopathy:      Cervical: No cervical adenopathy. Skin:     General: Skin is warm and dry. Findings: No erythema or rash. Neurological:      General: No focal deficit present. Mental Status: He is alert and oriented to person, place, and time.    Psychiatric:         Mood and Affect: Mood normal.         Behavior: Behavior normal.         Vital Signs  ED Triage Vitals   Temperature Pulse Respirations Blood Pressure SpO2   09/06/23 1051 09/06/23 1047 09/06/23 1047 09/06/23 1047 09/06/23 1047   98.2 °F (36.8 °C) 78 18 (!) 172/79 97 %      Temp Source Heart Rate Source Patient Position - Orthostatic VS BP Location FiO2 (%)   09/06/23 1051 09/06/23 1047 09/06/23 1047 09/06/23 1047 --   Oral Monitor Lying Right arm       Pain Score       --                  Vitals:    09/06/23 1200 09/06/23 1230 09/06/23 1330 09/06/23 1400   BP: 134/71 131/73 145/70 136/70   Pulse: 78 79 78 76   Patient Position - Orthostatic VS:             Visual Acuity      ED Medications  Medications - No data to display    Diagnostic Studies  Results Reviewed     Procedure Component Value Units Date/Time    HS Troponin I 2hr [[id number]]  (Normal) Collected: 09/06/23 1344    Lab Status: Final result Specimen: Blood from Line Updated: 09/06/23 1419     hs TnI 2hr 2 ng/L      Delta 2hr hsTnI 0 ng/L     HS Troponin 0hr (reflex protocol) [[id number]]  (Normal) Collected: 09/06/23 1128    Lab Status: Final result Specimen: Blood from Arm, Right Updated: 09/06/23 1208     hs TnI 0hr 2 ng/L     HS Troponin I 4hr [[id number]]     Lab Status: No result Specimen: Blood     Comprehensive metabolic panel [[id number]] Collected: 09/06/23 1128    Lab Status: Final result Specimen: Blood from Arm, Right Updated: 09/06/23 1204     Sodium 138 mmol/L      Potassium 4.3 mmol/L      Chloride 102 mmol/L      CO2 28 mmol/L      ANION GAP 8 mmol/L      BUN 16 mg/dL      Creatinine 0.94 mg/dL      Glucose 115 mg/dL      Calcium 9.7 mg/dL      AST 18 U/L      ALT 22 U/L      Alkaline Phosphatase 66 U/L      Total Protein 8.0 g/dL      Albumin 4.5 g/dL      Total Bilirubin 0.84 mg/dL      eGFR 85 ml/min/1.73sq m     Narrative:      [place] guidelines for Chronic Kidney Disease (CKD):   •  Stage 1 with normal or high GFR (GFR > 90 mL/min/1.73 square meters)  •  Stage 2 Mild CKD (GFR = 60-89 mL/min/1.73 square meters)  •  Stage 3A Moderate CKD (GFR = 45-59 mL/min/1.73 square meters)  •  Stage 3B Moderate CKD (GFR = 30-44 mL/min/1.73 square meters)  •  Stage 4 Severe CKD (GFR = 15-29 mL/min/1.73 square meters)  •  Stage 5 End Stage CKD (GFR <15 mL/min/1.73 square meters)  Note: GFR calculation is accurate only with a steady state creatinine    Protime-INR [[id number]]  (Normal) Collected: 09/06/23 1128    Lab Status: Final result Specimen: Blood from Arm, Right Updated: 09/06/23 1159     Protime 13.4 seconds      INR 1.02    APTT [[id number]]  (Abnormal) Collected: 09/06/23 1128    Lab Status: Final result Specimen: Blood from Arm, Right Updated: 09/06/23 1159     PTT 39 seconds     CBC and differential [[id number]] Collected: 09/06/23 1128    Lab Status: Final result Specimen: Blood from Arm, Right Updated: 09/06/23 1137     WBC 9.56 Thousand/uL      RBC 4.76 Million/uL      Hemoglobin 14.1 g/dL      Hematocrit 42.5 %      MCV 89 fL      MCH 29.6 pg      MCHC 33.2 g/dL      RDW 12.0 %      MPV 9.1 fL      Platelets 943 Thousands/uL      nRBC 0 /100 WBCs      Neutrophils Relative 59 %      Immat GRANS % 1 %      Lymphocytes Relative 28 %      Monocytes Relative 8 %      Eosinophils Relative 3 %      Basophils Relative 1 %      Neutrophils Absolute 5.64 Thousands/µL      Immature Grans Absolute 0.06 Thousand/uL      Lymphocytes Absolute 2.71 Thousands/µL      Monocytes Absolute 0.76 Thousand/µL      Eosinophils Absolute 0.32 Thousand/µL      Basophils Absolute 0.07 Thousands/µL                  XR chest 2 views   ED Interpretation by Cheryle Norton PA-C (09/06 1224)   No focal consolidation       Final Result by Elvi Bruner MD (09/06 1314)      No acute cardiopulmonary disease.       Findings are stable            Workstation performed: [id number]                    Procedures  ECG 12 Lead Documentation Only    Date/Time: 9/6/2023 12:09 PM    Performed by: Cheryle Norton PA-C  Authorized by: Cheryle Norton PA-C    Indications / Diagnosis:  Pain/numbness  ECG reviewed by me, the ED Provider: yes    Patient location:  ED  Previous ECG:     Previous ECG:  Unavailable  Interpretation:     Interpretation: normal    Rate:     ECG rate:  80    ECG rate assessment: normal    Rhythm:     Rhythm: sinus rhythm    Ectopy:     Ectopy: none    QRS:     QRS axis:  Normal    QRS intervals:  Normal  Conduction:     Conduction: normal    ST segments:     ST segments:  Normal  T waves:     T waves: normal      ECG 12 Lead Documentation Only    Date/Time: 9/6/2023 1:54 PM    Performed by: Cheryle Norton PA-C  Authorized by: Cheryle Norton PA-C    Indications / Diagnosis:  Pain  ECG reviewed by me, the ED Provider: yes    Patient location:  ED  Previous ECG:     Previous ECG:  Compared to current  Interpretation:     Interpretation: normal    Rate: ECG rate:  74    ECG rate assessment: normal    Rhythm:     Rhythm: sinus rhythm    Ectopy:     Ectopy: none    QRS:     QRS axis:  Normal    QRS intervals:  Normal  Conduction:     Conduction: normal    ST segments:     ST segments:  Normal  T waves:     T waves: normal               ED Course             HEART Risk Score    Flowsheet Row Most Recent Value   Heart Score Risk Calculator    History 1 Filed at: 09/06/2023 1225   ECG 0 Filed at: 09/06/2023 1225   Age 1 Filed at: 09/06/2023 1225   Risk Factors 2 Filed at: 09/06/2023 1225   Troponin 0 Filed at: 09/06/2023 1225   HEART Score 4 Filed at: 09/06/2023 1225                        SBIRT 20yo+    Flowsheet Row Most Recent Value   Initial Alcohol Screen: US AUDIT-C     1. How often do you have a drink containing alcohol? 1 Filed at: 09/06/2023 1126   2. How many drinks containing alcohol do you have on a typical day you are drinking? 2 Filed at: 09/06/2023 1126   3a. Male UNDER 65: How often do you have five or more drinks on one occasion? 0 Filed at: 09/06/2023 1126   Audit-C Score 3 Filed at: 09/06/2023 1126   NEISHA: How many times in the past year have you. .. Used an illegal drug or used a prescription medication for non-medical reasons? Never Filed at: 09/06/2023 1126                    Medical Decision Making  Differential diagnosis includes but not limited to: ACS,     Chest pain: acute illness or injury  Paresthesias: acute illness or injury  Amount and/or Complexity of Data Reviewed  Labs: ordered. Decision-making details documented in ED Course. Radiology: ordered and independent interpretation performed. Decision-making details documented in ED Course.           Disposition  Final diagnoses:   Paresthesias   Chest pain     Time reflects when diagnosis was documented in both MDM as applicable and the Disposition within this note     Time User Action Codes Description Comment    9/6/2023  2:36 PM Ana Loving Add [R20.2] Paresthesias     9/6/2023 2:36 PM Riana Loving Kassie Add [R07.9] Chest pain       ED Disposition     ED Disposition   Discharge    Condition   Stable    Date/Time   Wed Sep 6, 2023  2:36 PM    Comment   Rema Lopez discharge to home/self care. Follow-up Information     Follow up With Specialties Details Why Contact Info Additional Information    Stefany Strange DO Family Medicine Schedule an appointment as soon as possible for a visit   [address] [facility] [address] [facility]       [address] [facility] Cardiology Schedule an appointment as soon as possible for a visit   [address] [facility] [id number]  [address], [facility], [address] [facility],[address], [address] [phone]          Patient's Medications   Discharge Prescriptions    No medications on file       No discharge procedures on file.     PDMP Review     None          ED Provider  Electronically Signed by           Agnieszka Pitt PA-C  09/06/23 1731

## 2023-09-06 NOTE — PROGRESS NOTES
Assessment/Plan: A1c 7.2. Labs reviewed. Long discussion with patient regarding possible unstable angina. Patient wishes to drive to emergency room at this time for troponin levels and further EKG/work-up. Patient does not wish to go via 911/ambulance as he is asymptomatic presently. Patient will continue with other medications per routine. Patient had refills given at this time. Patient will follow-up in 3 months or posthospitalization depending outcome at emergency room. Diagnoses and all orders for this visit:    Precordial pain    Type 2 diabetes mellitus without complication, unspecified whether long term insulin use (HCC)  -     POCT hemoglobin A1c  -     ezetimibe (ZETIA) 10 mg tablet; Take 1 tablet (10 mg total) by mouth daily  -     metFORMIN (GLUCOPHAGE) 1000 MG tablet; Take 1 tablet (1,000 mg total) by mouth 2 (two) times a day  -     rosuvastatin (CRESTOR) 40 MG tablet; Take 1 tablet (40 mg total) by mouth daily    H/O seasonal allergies    S/P coronary artery stent placement  -     losartan (COZAAR) 25 mg tablet; Take 0.5 tablets (12.5 mg total) by mouth daily  -     rivaroxaban (XARELTO) 2.5 mg tablet; Take 1 tablet (2.5 mg total) by mouth 2 (two) times a day    Bronchitis  -     metFORMIN (GLUCOPHAGE) 1000 MG tablet; Take 1 tablet (1,000 mg total) by mouth 2 (two) times a day    NSTEMI (non-ST elevated myocardial infarction) ([facility])  -     metoprolol tartrate (LOPRESSOR) 50 mg tablet; Take 1 tablet (50 mg total) by mouth every 12 (twelve) hours  -     nitroglycerin (NITROSTAT) 0.4 mg SL tablet; Place 1 tablet (0.4 mg total) under the tongue every 5 (five) minutes as needed for chest pain    Coronary artery disease involving native coronary artery of native heart without angina pectoris  -     rivaroxaban (XARELTO) 2.5 mg tablet;  Take 1 tablet (2.5 mg total) by mouth 2 (two) times a day    Mixed hyperlipidemia    Chest pain, unspecified type  -     Transfer to other facility Subjective:        Patient ID: Osito Levy is a 58 y.o. male. Patient here to follow-up on diabetes hypertension hyperlipidemia CAD. Patient did have event of numbness and tingling in his lower extremities and hands. Patient did have some weakness in his legs. Patient was working at that time. Patient also had a chest pain that went across his chest.  Total length of time was roughly a minute. Patient with recurrent events today with exertion. No diaphoresis nausea vomiting or shortness of breath associated with this. Reflux not at the time of the event. No fevers or chills. Patient asymptomatic at this time. Patient with pain in both legs associated with this. No new medication added. Fatigue  Associated symptoms include arthralgias, chest pain and fatigue. Generalized Body Aches  Associated symptoms include fatigue and chest pain. The following portions of the patient's history were reviewed and updated as appropriate: allergies, current medications, past family history, past medical history, past social history, past surgical history and problem list.      Review of Systems   Constitutional: Positive for fatigue. HENT: Negative. Eyes: Negative. Respiratory: Negative. Cardiovascular: Positive for chest pain. Gastrointestinal: Negative. Endocrine: Negative. Genitourinary: Negative. Musculoskeletal: Positive for arthralgias and back pain. Skin: Negative. Allergic/Immunologic: Negative. Neurological: Negative. Hematological: Negative. Psychiatric/Behavioral: Negative. Objective:      BMI Counseling: Body mass index is 31.85 kg/m². The BMI is above normal. Nutrition recommendations include consuming healthier snacks. Exercise recommendations include strength training exercises. Rationale for BMI follow-up plan is due to patient being overweight or obese.      Depression Screening and Follow-up Plan: Patient was screened for depression during today's encounter. They screened negative with a PHQ-2 score of 2.            /84 (BP Location: Right arm, Patient Position: Sitting, Cuff Size: Standard)   Pulse 82   Temp 97.8 °F (36.6 °C) (Temporal)   Ht 6' 1" (1.854 m)   Wt 109 kg (241 lb 6.4 oz)   SpO2 99%   BMI 31.85 kg/m²          Physical Exam  Vitals and nursing note reviewed. Constitutional:       General: He is not in acute distress. Appearance: Normal appearance. He is not ill-appearing, toxic-appearing or diaphoretic. HENT:      Head: Normocephalic and atraumatic. Right Ear: Tympanic membrane, ear canal and external ear normal. There is no impacted cerumen. Left Ear: Tympanic membrane, ear canal and external ear normal. There is no impacted cerumen. Nose: Nose normal. No congestion or rhinorrhea. Mouth/Throat:      Mouth: Mucous membranes are moist.      Pharynx: No oropharyngeal exudate or posterior oropharyngeal erythema. Eyes:      General: No scleral icterus. Right eye: No discharge. Left eye: No discharge. Extraocular Movements: Extraocular movements intact. Conjunctiva/sclera: Conjunctivae normal.      Pupils: Pupils are equal, round, and reactive to light. Neck:      Vascular: No carotid bruit. Cardiovascular:      Rate and Rhythm: Normal rate and regular rhythm. Pulses: Normal pulses. Heart sounds: Normal heart sounds. No murmur heard. No friction rub. No gallop. Pulmonary:      Effort: Pulmonary effort is normal. No respiratory distress. Breath sounds: Normal breath sounds. No stridor. No wheezing, rhonchi or rales. Chest:      Chest wall: No tenderness. Musculoskeletal:         General: No swelling, tenderness, deformity or signs of injury. Normal range of motion. Cervical back: Normal range of motion and neck supple. No rigidity. No muscular tenderness. Right lower leg: No edema. Left lower leg: No edema. Lymphadenopathy:      Cervical: No cervical adenopathy. Skin:     General: Skin is warm and dry. Capillary Refill: Capillary refill takes less than 2 seconds. Coloration: Skin is not jaundiced. Findings: No bruising, erythema, lesion or rash. Neurological:      Mental Status: He is alert and oriented to person, place, and time. Mental status is at baseline. Cranial Nerves: No cranial nerve deficit. Sensory: No sensory deficit. Motor: No weakness. Coordination: Coordination normal.      Gait: Gait normal.   Psychiatric:         Mood and Affect: Mood normal.         Behavior: Behavior normal.         Thought Content:  Thought content normal.         Judgment: Judgment normal.

## 2023-09-13 ENCOUNTER — OFFICE VISIT (OUTPATIENT)
Dept: FAMILY MEDICINE CLINIC | Facility: CLINIC | Age: 63
End: 2023-09-13
Payer: COMMERCIAL

## 2023-09-13 VITALS
OXYGEN SATURATION: 96 % | TEMPERATURE: 97.7 F | SYSTOLIC BLOOD PRESSURE: 116 MMHG | DIASTOLIC BLOOD PRESSURE: 80 MMHG | HEIGHT: 73 IN | WEIGHT: 239.2 LBS | HEART RATE: 83 BPM | BODY MASS INDEX: 31.7 KG/M2

## 2023-09-13 DIAGNOSIS — E11.9 TYPE 2 DIABETES MELLITUS WITHOUT COMPLICATION, UNSPECIFIED WHETHER LONG TERM INSULIN USE (HCC): Primary | ICD-10-CM

## 2023-09-13 DIAGNOSIS — I10 PRIMARY HYPERTENSION: ICD-10-CM

## 2023-09-13 DIAGNOSIS — R07.2 PRECORDIAL PAIN: ICD-10-CM

## 2023-09-13 DIAGNOSIS — R20.2 PARESTHESIA: ICD-10-CM

## 2023-09-13 DIAGNOSIS — R25.1 TREMOR OF BOTH HANDS: ICD-10-CM

## 2023-09-13 DIAGNOSIS — I21.4 NSTEMI (NON-ST ELEVATED MYOCARDIAL INFARCTION) (HCC): ICD-10-CM

## 2023-09-13 DIAGNOSIS — M94.0 COSTOCHONDRITIS: ICD-10-CM

## 2023-09-13 DIAGNOSIS — E78.2 MIXED HYPERLIPIDEMIA: ICD-10-CM

## 2023-09-13 LAB
CREAT UR-MCNC: 171.4 MG/DL
LEFT EYE DIABETIC RETINOPATHY: NORMAL
LEFT EYE MACULAR EDEMA: NORMAL
LEFT EYE OTHER RETINOPATHY: NORMAL
MICROALBUMIN UR-MCNC: 19.6 MG/L
MICROALBUMIN/CREAT 24H UR: 11 MG/G CREATININE (ref 0–30)
RIGHT EYE DIABETIC RETINOPATHY: NORMAL
RIGHT EYE IMAGE QUALITY: NORMAL
RIGHT EYE MACULAR EDEMA: NORMAL
RIGHT EYE OTHER RETINOPATHY: NORMAL
SEVERITY (EYE EXAM): NORMAL

## 2023-09-13 PROCEDURE — 82570 ASSAY OF URINE CREATININE: CPT | Performed by: FAMILY MEDICINE

## 2023-09-13 PROCEDURE — 99214 OFFICE O/P EST MOD 30 MIN: CPT | Performed by: FAMILY MEDICINE

## 2023-09-13 PROCEDURE — 82043 UR ALBUMIN QUANTITATIVE: CPT | Performed by: FAMILY MEDICINE

## 2023-09-13 RX ORDER — PRIMIDONE 50 MG/1
50 TABLET ORAL
Qty: 90 TABLET | Refills: 1 | Status: SHIPPED | OUTPATIENT
Start: 2023-09-13

## 2023-09-13 NOTE — PROGRESS NOTES
Assessment/Plan: Note for work. Patient to be out of work for 1 week. Then patient will have 2-week light duty duty over the next/following 2 weeks. Patient will start primidone. To consider further work-up if persist.  Patient will follow-up in January. Diagnoses and all orders for this visit:    Type 2 diabetes mellitus without complication, unspecified whether long term insulin use (HCC)  -     Albumin / creatinine urine ratio; Future  -     IRIS Diabetic eye exam  -     Albumin / creatinine urine ratio    NSTEMI (non-ST elevated myocardial infarction) ([address])    Primary hypertension    Mixed hyperlipidemia    Tremor of both hands  -     primidone (MYSOLINE) 50 mg tablet; Take 1 tablet (50 mg total) by mouth daily at bedtime    Paresthesia    Precordial pain    Costochondritis            Subjective:        Patient ID: Crispin Or is a 58 y.o. male. HPI      The following portions of the patient's history were reviewed and updated as appropriate: allergies, current medications, past family history, past medical history, past social history, past surgical history and problem list.      Review of Systems   Constitutional: Positive for fatigue. HENT: Negative. Eyes: Negative. Respiratory: Negative. Cardiovascular: Negative. Gastrointestinal: Negative. Endocrine: Negative. Genitourinary: Negative. Musculoskeletal: Positive for arthralgias and neck pain. Skin: Negative. Allergic/Immunologic: Negative. Neurological:        Paresthesia arms and legs        Hematological: Negative. Psychiatric/Behavioral: Negative. Objective:               /80 (BP Location: Right arm, Patient Position: Sitting, Cuff Size: Large)   Pulse 83   Temp 97.7 °F (36.5 °C) (Tympanic)   Ht 6' 1" (1.854 m)   Wt 109 kg (239 lb 3.2 oz)   SpO2 96%   BMI 31.56 kg/m²          Physical Exam  Vitals and nursing note reviewed.    Constitutional:       General: He is not in acute distress. Appearance: Normal appearance. He is not ill-appearing, toxic-appearing or diaphoretic. HENT:      Head: Normocephalic and atraumatic. Right Ear: Tympanic membrane, ear canal and external ear normal. There is no impacted cerumen. Left Ear: Tympanic membrane, ear canal and external ear normal. There is no impacted cerumen. Nose: Nose normal. No congestion or rhinorrhea. Mouth/Throat:      Mouth: Mucous membranes are moist.      Pharynx: No oropharyngeal exudate or posterior oropharyngeal erythema. Eyes:      General: No scleral icterus. Right eye: No discharge. Left eye: No discharge. Neck:      Vascular: No carotid bruit. Cardiovascular:      Rate and Rhythm: Normal rate and regular rhythm. Pulses: Normal pulses. Heart sounds: Normal heart sounds. No murmur heard. No friction rub. No gallop. Pulmonary:      Effort: Pulmonary effort is normal. No respiratory distress. Breath sounds: Normal breath sounds. No stridor. No wheezing, rhonchi or rales. Chest:      Chest wall: No tenderness. Musculoskeletal:         General: No swelling, tenderness, deformity or signs of injury. Normal range of motion. Cervical back: Normal range of motion and neck supple. No rigidity. No muscular tenderness. Right lower leg: No edema. Left lower leg: No edema. Lymphadenopathy:      Cervical: No cervical adenopathy. Skin:     General: Skin is warm and dry. Capillary Refill: Capillary refill takes less than 2 seconds. Coloration: Skin is not jaundiced. Findings: No bruising, erythema, lesion or rash. Neurological:      Mental Status: He is alert and oriented to person, place, and time. Mental status is at baseline. Cranial Nerves: No cranial nerve deficit. Sensory: No sensory deficit. Motor: No weakness.       Coordination: Coordination normal.      Gait: Gait normal.   Psychiatric:         Mood and Affect: Mood normal.         Behavior: Behavior normal.         Thought Content:  Thought content normal.         Judgment: Judgment normal.

## 2023-12-07 ENCOUNTER — OFFICE VISIT (OUTPATIENT)
Dept: FAMILY MEDICINE CLINIC | Facility: CLINIC | Age: 63
End: 2023-12-07

## 2023-12-07 VITALS
WEIGHT: 244 LBS | HEIGHT: 73 IN | BODY MASS INDEX: 32.34 KG/M2 | SYSTOLIC BLOOD PRESSURE: 155 MMHG | OXYGEN SATURATION: 96 % | DIASTOLIC BLOOD PRESSURE: 73 MMHG | HEART RATE: 78 BPM | TEMPERATURE: 98.1 F

## 2023-12-07 DIAGNOSIS — J01.10 ACUTE NON-RECURRENT FRONTAL SINUSITIS: Primary | ICD-10-CM

## 2023-12-07 PROCEDURE — 99213 OFFICE O/P EST LOW 20 MIN: CPT | Performed by: FAMILY MEDICINE

## 2023-12-07 RX ORDER — DOXYCYCLINE HYCLATE 100 MG/1
100 CAPSULE ORAL EVERY 12 HOURS SCHEDULED
Qty: 20 CAPSULE | Refills: 0 | Status: SHIPPED | OUTPATIENT
Start: 2023-12-07 | End: 2023-12-17

## 2023-12-07 NOTE — PROGRESS NOTES
Assessment/Plan:       Diagnoses and all orders for this visit:    Acute non-recurrent frontal sinusitis  -     doxycycline hyclate (VIBRAMYCIN) 100 mg capsule; Take 1 capsule (100 mg total) by mouth every 12 (twelve) hours for 10 days            Subjective:        Patient ID: López Toscano is a 58 y.o. male. Patient here with chest pain, congestion, nasal congestion, headache since last Thursday. Symptoms are improving. Patient did use Tylenol. Patient did have a little diarrhea associate with this. No vomiting. Patient with fatigue              The following portions of the patient's history were reviewed and updated as appropriate: allergies, current medications, past family history, past medical history, past social history, past surgical history and problem list.      Review of Systems   Constitutional:  Positive for fatigue. Negative for fever. HENT:  Positive for congestion, postnasal drip, rhinorrhea, sinus pressure and sinus pain. Eyes: Negative. Respiratory:  Positive for cough and chest tightness. Cardiovascular: Negative. Gastrointestinal: Negative. Endocrine: Negative. Genitourinary: Negative. Musculoskeletal: Negative. Skin: Negative. Allergic/Immunologic: Negative. Neurological: Negative. Hematological: Negative. Psychiatric/Behavioral: Negative. Objective:               /73 (BP Location: Right arm, Patient Position: Sitting, Cuff Size: Large)   Pulse 78   Temp 98.1 °F (36.7 °C) (Tympanic)   Ht 6' 1" (1.854 m)   Wt 111 kg (244 lb)   SpO2 96%   BMI 32.19 kg/m²          Physical Exam  Vitals and nursing note reviewed. Constitutional:       General: He is not in acute distress. Appearance: Normal appearance. He is not ill-appearing, toxic-appearing or diaphoretic. HENT:      Head: Normocephalic and atraumatic. Right Ear: Tympanic membrane, ear canal and external ear normal. There is no impacted cerumen.       Left Ear: Tympanic membrane, ear canal and external ear normal. There is no impacted cerumen. Nose: Nose normal. No congestion or rhinorrhea. Mouth/Throat:      Mouth: Mucous membranes are moist.      Pharynx: No oropharyngeal exudate or posterior oropharyngeal erythema. Eyes:      General: No scleral icterus. Right eye: No discharge. Left eye: No discharge. Neck:      Vascular: No carotid bruit. Cardiovascular:      Rate and Rhythm: Normal rate and regular rhythm. Pulses: Normal pulses. Heart sounds: Normal heart sounds. No murmur heard. No friction rub. No gallop. Pulmonary:      Effort: Pulmonary effort is normal. No respiratory distress. Breath sounds: Normal breath sounds. No stridor. No wheezing, rhonchi or rales. Chest:      Chest wall: No tenderness. Musculoskeletal:         General: No swelling, tenderness, deformity or signs of injury. Normal range of motion. Cervical back: Normal range of motion and neck supple. No rigidity. No muscular tenderness. Right lower leg: No edema. Left lower leg: No edema. Lymphadenopathy:      Cervical: No cervical adenopathy. Skin:     General: Skin is warm and dry. Capillary Refill: Capillary refill takes less than 2 seconds. Coloration: Skin is not jaundiced. Findings: No bruising, erythema, lesion or rash. Neurological:      Mental Status: He is alert and oriented to person, place, and time. Mental status is at baseline. Cranial Nerves: No cranial nerve deficit. Sensory: No sensory deficit. Motor: No weakness. Coordination: Coordination normal.      Gait: Gait normal.   Psychiatric:         Mood and Affect: Mood normal.         Behavior: Behavior normal.         Thought Content:  Thought content normal.         Judgment: Judgment normal.

## 2024-01-15 ENCOUNTER — OFFICE VISIT (OUTPATIENT)
Dept: FAMILY MEDICINE CLINIC | Facility: CLINIC | Age: 64
End: 2024-01-15
Payer: COMMERCIAL

## 2024-01-15 VITALS
TEMPERATURE: 98.4 F | HEIGHT: 73 IN | OXYGEN SATURATION: 95 % | HEART RATE: 90 BPM | BODY MASS INDEX: 32.23 KG/M2 | DIASTOLIC BLOOD PRESSURE: 76 MMHG | SYSTOLIC BLOOD PRESSURE: 132 MMHG | WEIGHT: 243.2 LBS

## 2024-01-15 DIAGNOSIS — Z12.12 ENCOUNTER FOR COLORECTAL CANCER SCREENING: ICD-10-CM

## 2024-01-15 DIAGNOSIS — R25.1 TREMOR OF BOTH HANDS: ICD-10-CM

## 2024-01-15 DIAGNOSIS — J01.00 ACUTE NON-RECURRENT MAXILLARY SINUSITIS: ICD-10-CM

## 2024-01-15 DIAGNOSIS — Z00.00 WELCOME TO MEDICARE PREVENTIVE VISIT: Primary | ICD-10-CM

## 2024-01-15 DIAGNOSIS — I21.4 NSTEMI (NON-ST ELEVATED MYOCARDIAL INFARCTION) (HCC): ICD-10-CM

## 2024-01-15 DIAGNOSIS — E11.9 TYPE 2 DIABETES MELLITUS WITHOUT COMPLICATION, UNSPECIFIED WHETHER LONG TERM INSULIN USE (HCC): ICD-10-CM

## 2024-01-15 DIAGNOSIS — Z12.5 SCREENING FOR PROSTATE CANCER: ICD-10-CM

## 2024-01-15 DIAGNOSIS — Z95.5 S/P CORONARY ARTERY STENT PLACEMENT: ICD-10-CM

## 2024-01-15 DIAGNOSIS — R07.2 PRECORDIAL PAIN: ICD-10-CM

## 2024-01-15 DIAGNOSIS — I25.10 CORONARY ARTERY DISEASE INVOLVING NATIVE CORONARY ARTERY OF NATIVE HEART WITHOUT ANGINA PECTORIS: ICD-10-CM

## 2024-01-15 DIAGNOSIS — Z12.11 ENCOUNTER FOR COLORECTAL CANCER SCREENING: ICD-10-CM

## 2024-01-15 DIAGNOSIS — J40 BRONCHITIS: ICD-10-CM

## 2024-01-15 LAB — SL AMB POCT HEMOGLOBIN AIC: 8 (ref ?–6.5)

## 2024-01-15 PROCEDURE — 83036 HEMOGLOBIN GLYCOSYLATED A1C: CPT | Performed by: FAMILY MEDICINE

## 2024-01-15 PROCEDURE — G0402 INITIAL PREVENTIVE EXAM: HCPCS | Performed by: FAMILY MEDICINE

## 2024-01-15 RX ORDER — EZETIMIBE 10 MG/1
10 TABLET ORAL DAILY
Qty: 60 TABLET | Refills: 2 | Status: SHIPPED | OUTPATIENT
Start: 2024-01-15

## 2024-01-15 RX ORDER — LEVOFLOXACIN 500 MG/1
500 TABLET, FILM COATED ORAL EVERY 24 HOURS
Qty: 7 TABLET | Refills: 0 | Status: SHIPPED | OUTPATIENT
Start: 2024-01-15 | End: 2024-01-22

## 2024-01-15 RX ORDER — LOSARTAN POTASSIUM 25 MG/1
12.5 TABLET ORAL DAILY
Qty: 45 TABLET | Refills: 1 | Status: SHIPPED | OUTPATIENT
Start: 2024-01-15

## 2024-01-15 RX ORDER — METOPROLOL TARTRATE 50 MG/1
50 TABLET, FILM COATED ORAL EVERY 12 HOURS SCHEDULED
Qty: 60 TABLET | Refills: 2 | Status: SHIPPED | OUTPATIENT
Start: 2024-01-15

## 2024-01-15 RX ORDER — PRIMIDONE 50 MG/1
50 TABLET ORAL
Qty: 60 TABLET | Refills: 2 | Status: SHIPPED | OUTPATIENT
Start: 2024-01-15

## 2024-01-15 RX ORDER — ROSUVASTATIN CALCIUM 40 MG/1
40 TABLET, COATED ORAL DAILY
Qty: 60 TABLET | Refills: 2 | Status: SHIPPED | OUTPATIENT
Start: 2024-01-15

## 2024-01-15 NOTE — PROGRESS NOTES
Assessment and Plan:     Problem List Items Addressed This Visit       Type 2 diabetes mellitus (HCC)    Relevant Medications    ezetimibe (ZETIA) 10 mg tablet    metFORMIN (GLUCOPHAGE) 1000 MG tablet    rosuvastatin (CRESTOR) 40 MG tablet    Other Relevant Orders    POCT hemoglobin A1c (Completed)    Comprehensive metabolic panel    Hemoglobin A1C    CBC and differential    Lipid panel    Albumin / creatinine urine ratio    TSH, 3rd generation with Free T4 reflex    Bronchitis    Relevant Medications    metFORMIN (GLUCOPHAGE) 1000 MG tablet    Acute non-recurrent maxillary sinusitis    Relevant Medications    levofloxacin (LEVAQUIN) 500 mg tablet    Tremor of both hands    Relevant Medications    primidone (MYSOLINE) 50 mg tablet    NSTEMI (non-ST elevated myocardial infarction) (HCC)    Relevant Medications    metoprolol tartrate (LOPRESSOR) 50 mg tablet    Coronary artery disease involving native coronary artery of native heart without angina pectoris    Relevant Medications    metoprolol tartrate (LOPRESSOR) 50 mg tablet    rivaroxaban (XARELTO) 2.5 mg tablet    Other Relevant Orders    XR chest pa & lateral    Precordial pain    Relevant Orders    XR chest pa & lateral     Other Visit Diagnoses       Welcome to Medicare preventive visit    -  Primary    Relevant Orders    Comprehensive metabolic panel    Hemoglobin A1C    CBC and differential    Lipid panel    Albumin / creatinine urine ratio    TSH, 3rd generation with Free T4 reflex    Encounter for colorectal cancer screening        Relevant Orders    Ambulatory Referral to Colorectal Surgery    Screening for prostate cancer        Relevant Orders    PSA, Total Screen    S/P coronary artery stent placement        Relevant Medications    losartan (COZAAR) 25 mg tablet    rivaroxaban (XARELTO) 2.5 mg tablet            Depression Screening and Follow-up Plan: Patient was screened for depression during today's encounter. They screened negative with a PHQ-2  score of 0.      Preventive health issues were discussed with patient, and age appropriate screening tests were ordered as noted in patient's After Visit Summary.  Personalized health advice and appropriate referrals for health education or preventive services given if needed, as noted in patient's After Visit Summary.     History of Present Illness:     Patient presents for a Medicare Wellness Visit    Patient is here for welcome to Medicare exam.  Patient feels as though he has some cold-like symptoms/chest discomfort associated with this.  Patient had other symptoms 2 weeks ago.       Patient Care Team:  Hector Vang DO as PCP - General     Review of Systems:     Review of Systems   Constitutional: Negative.    HENT:  Positive for congestion.    Eyes: Negative.    Respiratory:  Positive for cough.    Cardiovascular:  Positive for chest pain.   Gastrointestinal: Negative.    Endocrine: Negative.    Genitourinary: Negative.    Musculoskeletal: Negative.    Skin: Negative.    Allergic/Immunologic: Negative.    Neurological: Negative.    Hematological: Negative.    Psychiatric/Behavioral: Negative.          Problem List:     Patient Active Problem List   Diagnosis    Erectile dysfunction    HTN (hypertension)    Mixed hyperlipidemia    Type 2 diabetes mellitus ([facility])    Bronchitis    Acute non-recurrent frontal sinusitis    Acute non-recurrent maxillary sinusitis    Pharyngitis due to Streptococcus species    Tremor of both hands    NSTEMI (non-ST elevated myocardial infarction) ([facility])    Essential tremor    Microalbuminuria    Coronary artery disease involving native coronary artery of native heart without angina pectoris    Recurrent acute serous otitis media of both ears    Type 2 diabetes mellitus with hyperosmolarity without coma, without long-term current use of insulin ([facility])    Precordial pain    Paresthesia    Costochondritis      Past Medical and Surgical History:     Past Medical History:   Diagnosis Date     Allergic     Asthma     Coronary artery disease     Diabetes (HCC)     HTN (hypertension)     Hypercholesteremia     Hypertension     Myocardial infarction (HCC)     Obesity      Past Surgical History:   Procedure Laterality Date    CARDIAC CATHETERIZATION      COLONOSCOPY      RESOLVED:     CORONARY STENT PLACEMENT      MASS EXCISION Right     post auricular cyst removal - in office - Dr. Paige - 6/10/19    TONSILLECTOMY        Family History:     Family History   Problem Relation Age of Onset    Liver cancer Father     Skin cancer Father     Tremor Mother     Tremor Maternal Grandmother       Social History:     Social History     Socioeconomic History    Marital status: Legally      Spouse name: None    Number of children: None    Years of education: None    Highest education level: None   Occupational History    None   Tobacco Use    Smoking status: Former     Current packs/day: 0.00     Average packs/day: 1 pack/day for 40.0 years (40.0 ttl pk-yrs)     Types: Cigarettes     Start date: 1979     Quit date: 2019     Years since quittin.3    Smokeless tobacco: Never    Tobacco comments:     he has quit several times before   Vaping Use    Vaping status: Never Used   Substance and Sexual Activity    Alcohol use: Never    Drug use: Never    Sexual activity: Not Currently     Partners: Female   Other Topics Concern    None   Social History Narrative    None     Social Determinants of Health     Financial Resource Strain: Low Risk  (1/15/2024)    Overall Financial Resource Strain (CARDIA)     Difficulty of Paying Living Expenses: Not very hard   Food Insecurity: Not on file   Transportation Needs: No Transportation Needs (1/15/2024)    PRAPARE - Transportation     Lack of Transportation (Medical): No     Lack of Transportation (Non-Medical): No   Physical Activity: Not on file   Stress: Not on file   Social Connections: Not on file   Intimate Partner Violence: Not on file   Housing  Stability: Not on file      Medications and Allergies:     Current Outpatient Medications   Medication Sig Dispense Refill    aspirin (ECOTRIN LOW STRENGTH) 81 mg EC tablet Take 1 tablet (81 mg total) by mouth daily 30 tablet 0    dextromethorphan-guaiFENesin (ROBITUSSIN DM)  mg/5 mL syrup Take 5 mL by mouth 3 (three) times a day as needed for cough 473 mL 1    ezetimibe (ZETIA) 10 mg tablet Take 1 tablet (10 mg total) by mouth daily 60 tablet 2    guaifenesin-codeine (GUAIFENESIN AC) 100-10 MG/5ML liquid Take 5 mL by mouth 3 (three) times a day as needed for cough 120 mL 1    levofloxacin (LEVAQUIN) 500 mg tablet Take 1 tablet (500 mg total) by mouth every 24 hours for 7 days 7 tablet 0    loratadine (CLARITIN) 10 mg tablet Take 1 tablet (10 mg total) by mouth daily 90 tablet 1    losartan (COZAAR) 25 mg tablet Take 0.5 tablets (12.5 mg total) by mouth daily 45 tablet 1    metFORMIN (GLUCOPHAGE) 1000 MG tablet Take 1 tablet (1,000 mg total) by mouth 2 (two) times a day 120 tablet 2    metoprolol tartrate (LOPRESSOR) 50 mg tablet Take 1 tablet (50 mg total) by mouth every 12 (twelve) hours 60 tablet 2    multivitamin (THERAGRAN) TABS Take 1 tablet by mouth daily      niacin (NIASPAN) 1000 MG CR tablet TAKE 1 TABLET (1,000 MG TOTAL) BY MOUTH DAILY AT BEDTIME 90 tablet 1    nitroglycerin (NITROSTAT) 0.4 mg SL tablet Place 1 tablet (0.4 mg total) under the tongue every 5 (five) minutes as needed for chest pain 30 tablet 0    Omega-3 Fatty Acids (FISH OIL) 1200 MG CAPS Take by mouth      primidone (MYSOLINE) 50 mg tablet Take 1 tablet (50 mg total) by mouth daily at bedtime 60 tablet 2    rivaroxaban (XARELTO) 2.5 mg tablet Take 1 tablet (2.5 mg total) by mouth 2 (two) times a day 120 tablet 2    rosuvastatin (CRESTOR) 40 MG tablet Take 1 tablet (40 mg total) by mouth daily 60 tablet 2    sildenafil (VIAGRA) 100 mg tablet Take 1 tablet (100 mg total) by mouth daily as needed for erectile dysfunction (Patient not  taking: Reported on 11/9/2022) 30 tablet 3     No current facility-administered medications for this visit.     Allergies   Allergen Reactions    Penicillins Throat Swelling    Shellfish Allergy - Food Allergy Throat Swelling      Immunizations:     Immunization History   Administered Date(s) Administered    Influenza, recombinant, quadrivalent,injectable, preservative free 09/24/2020    Pneumococcal Polysaccharide PPV23 09/04/2020      Health Maintenance:         Topic Date Due    Colorectal Cancer Screening  Never done    HIV Screening  Discontinued    Hepatitis C Screening  Discontinued    Lung Cancer Screening  Discontinued         Topic Date Due    COVID-19 Vaccine (1) Never done    Pneumococcal Vaccine: 65+ Years (2 - PCV) 09/04/2021    Influenza Vaccine (1) 09/01/2023      Medicare Screening Tests and Risk Assessments:     [facility] is here for his Welcome to Medicare visit.     Health Risk Assessment:   Patient rates overall health as good. Patient feels that their physical health rating is same. Patient is satisfied with their life. Eyesight was rated as slightly worse. Hearing was rated as same. Patient feels that their emotional and mental health rating is same. Patient states they are never, rarely unusually tired/fatigued. Pain experienced in the last 7 days has been some. Patient's pain rating has been 3/10. Patient states that he has experienced weight loss or gain in last 6 months.     Depression Screening:   PHQ-2 Score: 0      Fall Risk Screening:   In the past year, patient has experienced: no history of falling in past year      Home Safety:  Patient does not have trouble with stairs inside or outside of their home. Patient has working smoke alarms and has working carbon monoxide detector. Home safety hazards include: none.     Nutrition:   Current diet is Diabetic.     Medications:   Patient is currently taking over-the-counter supplements. OTC medications include: see medication list. Patient is  able to manage medications.     Activities of Daily Living (ADLs)/Instrumental Activities of Daily Living (IADLs):   Walk and transfer into and out of bed and chair?: Yes  Dress and groom yourself?: Yes    Bathe or shower yourself?: Yes    Feed yourself? Yes  Do your laundry/housekeeping?: Yes  Manage your money, pay your bills and track your expenses?: Yes  Make your own meals?: Yes    Do your own shopping?: Yes    Previous Hospitalizations:   Any hospitalizations or ED visits within the last 12 months?: No      Advance Care Planning:   Living will: Yes    Durable POA for healthcare: Yes    Advanced directive: Yes      Cognitive Screening:   Provider or family/friend/caregiver concerned regarding cognition?: No    PREVENTIVE SCREENINGS      Cardiovascular Screening:    General: Screening Not Indicated, History Lipid Disorder and Risks and Benefits Discussed    Due for: Lipid Panel      Diabetes Screening:     General: Screening Not Indicated, History Diabetes and Risks and Benefits Discussed    Due for: Blood Glucose      Colorectal Cancer Screening:     General: Risks and Benefits Discussed    Due for: Colonoscopy - Low Risk      Prostate Cancer Screening:    General: Risks and Benefits Discussed    Due for: PSA      Osteoporosis Screening:    General: Risks and Benefits Discussed and Screening Not Indicated      Abdominal Aortic Aneurysm (AAA) Screening:    Risk factors include: age between 65-76 yo and tobacco use        General: Risks and Benefits Discussed and Screening Not Indicated      Lung Cancer Screening:     General: Screening Not Indicated and Risks and Benefits Discussed      Hepatitis C Screening:    General: Risks and Benefits Discussed and Screening Current    Other Counseling Topics:   Regular weightbearing exercise.     No results found.     Physical Exam:     /76 (BP Location: Right arm, Patient Position: Sitting, Cuff Size: Standard)   Pulse 90   Temp 98.4 °F (36.9 °C) (Temporal)   Ht  "6' 1\" (1.854 m)   Wt 110 kg (243 lb 3.2 oz)   SpO2 95%   BMI 32.09 kg/m²     Physical Exam  Vitals and nursing note reviewed.   Constitutional:       General: He is not in acute distress.     Appearance: Normal appearance. He is well-developed. He is not ill-appearing, toxic-appearing or diaphoretic.   HENT:      Head: Normocephalic and atraumatic.      Right Ear: Tympanic membrane, ear canal and external ear normal.      Left Ear: Tympanic membrane, ear canal and external ear normal.      Nose: Nose normal. No congestion or rhinorrhea.   Eyes:      General: No scleral icterus.        Right eye: No discharge.         Left eye: No discharge.   Neck:      Thyroid: No thyromegaly.      Vascular: No JVD.      Trachea: No tracheal deviation.   Cardiovascular:      Rate and Rhythm: Normal rate and regular rhythm.      Pulses: Normal pulses.      Heart sounds: Normal heart sounds. No murmur heard.     No friction rub. No gallop.   Pulmonary:      Effort: Pulmonary effort is normal. No respiratory distress.      Breath sounds: Normal breath sounds. No stridor. No wheezing or rales.   Chest:      Chest wall: No tenderness.   Musculoskeletal:         General: No tenderness or deformity. Normal range of motion.      Cervical back: Normal range of motion and neck supple.   Lymphadenopathy:      Cervical: No cervical adenopathy.   Skin:     General: Skin is warm and dry.      Capillary Refill: Capillary refill takes less than 2 seconds.      Coloration: Skin is not pale.      Findings: No erythema or rash.   Neurological:      Mental Status: He is alert and oriented to person, place, and time. Mental status is at baseline.      Cranial Nerves: No cranial nerve deficit.      Sensory: No sensory deficit.      Motor: No abnormal muscle tone.      Coordination: Coordination normal.      Deep Tendon Reflexes: Reflexes normal.   Psychiatric:         Behavior: Behavior normal.         Thought Content: Thought content normal.         " Judgment: Judgment normal.          Imtiaz Vang, DO

## 2024-01-15 NOTE — PATIENT INSTRUCTIONS
Medicare Preventive Visit Patient Instructions  Thank you for completing your Welcome to Medicare Visit or Medicare Annual Wellness Visit today. Your next wellness visit will be due in one year (1/15/2025).  The screening/preventive services that you may require over the next 5-10 years are detailed below. Some tests may not apply to you based off risk factors and/or age. Screening tests ordered at today's visit but not completed yet may show as past due. Also, please note that scanned in results may not display below.  Preventive Screenings:  Service Recommendations Previous Testing/Comments   Colorectal Cancer Screening  Colonoscopy    Fecal Occult Blood Test (FOBT)/Fecal Immunochemical Test (FIT)  Fecal DNA/Cologuard Test  Flexible Sigmoidoscopy Age: 45-75 years old   Colonoscopy: every 10 years (May be performed more frequently if at higher risk)  OR  FOBT/FIT: every 1 year  OR  Cologuard: every 3 years  OR  Sigmoidoscopy: every 5 years  Screening may be recommended earlier than age 45 if at higher risk for colorectal cancer. Also, an individualized decision between you and your healthcare provider will decide whether screening between the ages of 76-85 would be appropriate. Colonoscopy: Not on file  FOBT/FIT: Not on file  Cologuard: Not on file  Sigmoidoscopy: Not on file          Prostate Cancer Screening Individualized decision between patient and health care provider in men between ages of 55-69   Medicare will cover every 12 months beginning on the day after your 50th birthday PSA: No results in last 5 years           Hepatitis C Screening Once for adults born between 1945 and 1965  More frequently in patients at high risk for Hepatitis C Hep C Antibody: 08/24/2019        Diabetes Screening 1-2 times per year if you're at risk for diabetes or have pre-diabetes Fasting glucose: 83 mg/dL (7/1/2021)  A1C: 7.2 (9/6/2023)  Screening Not Indicated  History Diabetes   Cholesterol Screening Once every 5 years if  you don't have a lipid disorder. May order more often based on risk factors. Lipid panel: 07/01/2021  Screening Not Indicated  History Lipid Disorder      Other Preventive Screenings Covered by Medicare:  Abdominal Aortic Aneurysm (AAA) Screening: covered once if your at risk. You're considered to be at risk if you have a family history of AAA or a male between the age of 65-75 who smoking at least 100 cigarettes in your lifetime.  Lung Cancer Screening: covers low dose CT scan once per year if you meet all of the following conditions: (1) Age 55-77; (2) No signs or symptoms of lung cancer; (3) Current smoker or have quit smoking within the last 15 years; (4) You have a tobacco smoking history of at least 20 pack years (packs per day x number of years you smoked); (5) You get a written order from a healthcare provider.  Glaucoma Screening: covered annually if you're considered high risk: (1) You have diabetes OR (2) Family history of glaucoma OR (3)  aged 50 and older OR (4)  American aged 65 and older  Osteoporosis Screening: covered every 2 years if you meet one of the following conditions: (1) Have a vertebral abnormality; (2) On glucocorticoid therapy for more than 3 months; (3) Have primary hyperparathyroidism; (4) On osteoporosis medications and need to assess response to drug therapy.  HIV Screening: covered annually if you're between the age of 15-65. Also covered annually if you are younger than 15 and older than 65 with risk factors for HIV infection. For pregnant patients, it is covered up to 3 times per pregnancy.    Immunizations:  Immunization Recommendations   Influenza Vaccine Annual influenza vaccination during flu season is recommended for all persons aged >= 6 months who do not have contraindications   Pneumococcal Vaccine   * Pneumococcal conjugate vaccine = PCV13 (Prevnar 13), PCV15 (Vaxneuvance), PCV20 (Prevnar 20)  * Pneumococcal polysaccharide vaccine = PPSV23  (Pneumovax) Adults 19-65 yo with certain risk factors or if 65+ yo  If never received any pneumonia vaccine: recommend Prevnar 20 (PCV20)  Give PCV20 if previously received 1 dose of PCV13 or PPSV23   Hepatitis B Vaccine 3 dose series if at intermediate or high risk (ex: diabetes, end stage renal disease, liver disease)   Respiratory syncytial virus (RSV) Vaccine - COVERED BY MEDICARE PART D  * RSVPreF3 (Arexvy) CDC recommends that adults 60 years of age and older may receive a single dose of RSV vaccine using shared clinical decision-making (SCDM)   Tetanus (Td) Vaccine - COST NOT COVERED BY MEDICARE PART B Following completion of primary series, a booster dose should be given every 10 years to maintain immunity against tetanus. Td may also be given as tetanus wound prophylaxis.   Tdap Vaccine - COST NOT COVERED BY MEDICARE PART B Recommended at least once for all adults. For pregnant patients, recommended with each pregnancy.   Shingles Vaccine (Shingrix) - COST NOT COVERED BY MEDICARE PART B  2 shot series recommended in those 19 years and older who have or will have weakened immune systems or those 50 years and older     Health Maintenance Due:      Topic Date Due   • Colorectal Cancer Screening  Never done   • HIV Screening  Discontinued   • Hepatitis C Screening  Discontinued   • Lung Cancer Screening  Discontinued     Immunizations Due:      Topic Date Due   • COVID-19 Vaccine (1) Never done   • Pneumococcal Vaccine: 65+ Years (2 - PCV) 09/04/2021   • Influenza Vaccine (1) 09/01/2023     Advance Directives   What are advance directives?  Advance directives are legal documents that state your wishes and plans for medical care. These plans are made ahead of time in case you lose your ability to make decisions for yourself. Advance directives can apply to any medical decision, such as the treatments you want, and if you want to donate organs.   What are the types of advance directives?  There are many types of  advance directives, and each state has rules about how to use them. You may choose a combination of any of the following:  Living will:  This is a written record of the treatment you want. You can also choose which treatments you do not want, which to limit, and which to stop at a certain time. This includes surgery, medicine, IV fluid, and tube feedings.   Durable power of  for healthcare (DPAHC):  This is a written record that states who you want to make healthcare choices for you when you are unable to make them for yourself. This person, called a proxy, is usually a family member or a friend. You may choose more than 1 proxy.  Do not resuscitate (DNR) order:  A DNR order is used in case your heart stops beating or you stop breathing. It is a request not to have certain forms of treatment, such as CPR. A DNR order may be included in other types of advance directives.  Medical directive:  This covers the care that you want if you are in a coma, near death, or unable to make decisions for yourself. You can list the treatments you want for each condition. Treatment may include pain medicine, surgery, blood transfusions, dialysis, IV or tube feedings, and a ventilator (breathing machine).  Values history:  This document has questions about your views, beliefs, and how you feel and think about life. This information can help others choose the care that you would choose.  Why are advance directives important?  An advance directive helps you control your care. Although spoken wishes may be used, it is better to have your wishes written down. Spoken wishes can be misunderstood, or not followed. Treatments may be given even if you do not want them. An advance directive may make it easier for your family to make difficult choices about your care.   Weight Management   Why it is important to manage your weight:  Being overweight increases your risk of health conditions such as heart disease, high blood pressure,  type 2 diabetes, and certain types of cancer. It can also increase your risk for osteoarthritis, sleep apnea, and other respiratory problems. Aim for a slow, steady weight loss. Even a small amount of weight loss can lower your risk of health problems.  How to lose weight safely:  A safe and healthy way to lose weight is to eat fewer calories and get regular exercise. You can lose up about 1 pound a week by decreasing the number of calories you eat by 500 calories each day.   Healthy meal plan for weight management:  A healthy meal plan includes a variety of foods, contains fewer calories, and helps you stay healthy. A healthy meal plan includes the following:  Eat whole-grain foods more often.  A healthy meal plan should contain fiber. Fiber is the part of grains, fruits, and vegetables that is not broken down by your body. Whole-grain foods are healthy and provide extra fiber in your diet. Some examples of whole-grain foods are whole-wheat breads and pastas, oatmeal, brown rice, and bulgur.  Eat a variety of vegetables every day.  Include dark, leafy greens such as spinach, kale, dotty greens, and mustard greens. Eat yellow and orange vegetables such as carrots, sweet potatoes, and winter squash.   Eat a variety of fruits every day.  Choose fresh or canned fruit (canned in its own juice or light syrup) instead of juice. Fruit juice has very little or no fiber.  Eat low-fat dairy foods.  Drink fat-free (skim) milk or 1% milk. Eat fat-free yogurt and low-fat cottage cheese. Try low-fat cheeses such as mozzarella and other reduced-fat cheeses.  Choose meat and other protein foods that are low in fat.  Choose beans or other legumes such as split peas or lentils. Choose fish, skinless poultry (chicken or turkey), or lean cuts of red meat (beef or pork). Before you cook meat or poultry, cut off any visible fat.   Use less fat and oil.  Try baking foods instead of frying them. Add less fat, such as margarine, sour  cream, regular salad dressing and mayonnaise to foods. Eat fewer high-fat foods. Some examples of high-fat foods include french fries, doughnuts, ice cream, and cakes.  Eat fewer sweets.  Limit foods and drinks that are high in sugar. This includes candy, cookies, regular soda, and sweetened drinks.  Exercise:  Exercise at least 30 minutes per day on most days of the week. Some examples of exercise include walking, biking, dancing, and swimming. You can also fit in more physical activity by taking the stairs instead of the elevator or parking farther away from stores. Ask your healthcare provider about the best exercise plan for you.      © Copyright [employer] 2018 Information is for End User's use only and may not be sold, redistributed or otherwise used for commercial purposes. All illustrations and images included in CareNotes® are the copyrighted property of [employer] Inc[employer] or [employer]

## 2024-02-21 PROBLEM — H65.06 RECURRENT ACUTE SEROUS OTITIS MEDIA OF BOTH EARS: Status: RESOLVED | Noted: 2020-09-03 | Resolved: 2024-02-21

## 2024-02-21 PROBLEM — J01.00 ACUTE NON-RECURRENT MAXILLARY SINUSITIS: Status: RESOLVED | Noted: 2018-11-30 | Resolved: 2024-02-21

## 2024-02-21 PROBLEM — J01.10 ACUTE NON-RECURRENT FRONTAL SINUSITIS: Status: RESOLVED | Noted: 2018-04-25 | Resolved: 2024-02-21

## 2024-04-11 ENCOUNTER — RA CDI HCC (OUTPATIENT)
Dept: OTHER | Facility: HOSPITAL | Age: 64
End: 2024-04-11

## 2024-04-11 DIAGNOSIS — I25.2 OLD MYOCARDIAL INFARCTION: Primary | ICD-10-CM

## 2024-04-18 DIAGNOSIS — I21.4 NSTEMI (NON-ST ELEVATED MYOCARDIAL INFARCTION) (HCC): ICD-10-CM

## 2024-04-18 RX ORDER — METOPROLOL TARTRATE 50 MG/1
50 TABLET, FILM COATED ORAL EVERY 12 HOURS SCHEDULED
Qty: 60 TABLET | Refills: 2 | Status: SHIPPED | OUTPATIENT
Start: 2024-04-18

## 2024-07-02 ENCOUNTER — TELEPHONE (OUTPATIENT)
Dept: ADMINISTRATIVE | Facility: OTHER | Age: 64
End: 2024-07-02

## 2024-07-02 NOTE — TELEPHONE ENCOUNTER
07/02/24 12:18 PM    Patient was flagged by a Third Party Payer report as being due for a refill on the following medications, LOSARTAN ,METFORMIN, .ROSUVASTATIN, Patient was contacted to review these medications. There was no answer and a message was left.     Thank you.  Horacio Sheehan MA  [facility]

## 2024-10-28 ENCOUNTER — TELEPHONE (OUTPATIENT)
Age: 64
End: 2024-10-28

## 2024-10-28 DIAGNOSIS — E11.9 TYPE 2 DIABETES MELLITUS WITHOUT COMPLICATION, UNSPECIFIED WHETHER LONG TERM INSULIN USE (HCC): Primary | ICD-10-CM

## 2024-10-28 NOTE — TELEPHONE ENCOUNTER
Called pt and left message for him to call us back to schedule a diabetic follow up with A1c dm eye and foot exam

## 2025-01-28 ENCOUNTER — TELEPHONE (OUTPATIENT)
Age: 65
End: 2025-01-28

## 2025-04-28 ENCOUNTER — TELEPHONE (OUTPATIENT)
Age: 65
End: 2025-04-28

## 2025-04-28 NOTE — TELEPHONE ENCOUNTER
Called pt, left voicemail for pt to call us back and schedule his medicare wellness visit , Pt needs referral to [facility].

## 2025-06-17 ENCOUNTER — OFFICE VISIT (OUTPATIENT)
Age: 65
End: 2025-06-17

## 2025-06-17 VITALS
TEMPERATURE: 98.1 F | BODY MASS INDEX: 31.01 KG/M2 | WEIGHT: 234 LBS | OXYGEN SATURATION: 96 % | DIASTOLIC BLOOD PRESSURE: 82 MMHG | SYSTOLIC BLOOD PRESSURE: 138 MMHG | HEART RATE: 98 BPM | HEIGHT: 73 IN

## 2025-06-17 DIAGNOSIS — J40 BRONCHITIS: ICD-10-CM

## 2025-06-17 DIAGNOSIS — I21.4 NSTEMI (NON-ST ELEVATED MYOCARDIAL INFARCTION) (HCC): ICD-10-CM

## 2025-06-17 DIAGNOSIS — Z95.5 S/P CORONARY ARTERY STENT PLACEMENT: ICD-10-CM

## 2025-06-17 DIAGNOSIS — H65.01 NON-RECURRENT ACUTE SEROUS OTITIS MEDIA OF RIGHT EAR: Primary | ICD-10-CM

## 2025-06-17 DIAGNOSIS — E11.9 TYPE 2 DIABETES MELLITUS WITHOUT COMPLICATION, UNSPECIFIED WHETHER LONG TERM INSULIN USE (HCC): ICD-10-CM

## 2025-06-17 PROCEDURE — 99213 OFFICE O/P EST LOW 20 MIN: CPT | Performed by: FAMILY MEDICINE

## 2025-06-17 RX ORDER — LEVOFLOXACIN 500 MG/1
500 TABLET, FILM COATED ORAL EVERY 24 HOURS
Qty: 7 TABLET | Refills: 0 | Status: SHIPPED | OUTPATIENT
Start: 2025-06-17 | End: 2025-06-24

## 2025-06-17 RX ORDER — LOSARTAN POTASSIUM 25 MG/1
12.5 TABLET ORAL DAILY
Qty: 45 TABLET | Refills: 1 | Status: SHIPPED | OUTPATIENT
Start: 2025-06-17

## 2025-06-17 RX ORDER — METOPROLOL TARTRATE 50 MG
50 TABLET ORAL EVERY 12 HOURS SCHEDULED
Qty: 60 TABLET | Refills: 2 | Status: SHIPPED | OUTPATIENT
Start: 2025-06-17

## 2025-06-17 RX ORDER — EZETIMIBE 10 MG/1
10 TABLET ORAL DAILY
Qty: 60 TABLET | Refills: 2 | Status: SHIPPED | OUTPATIENT
Start: 2025-06-17

## 2025-06-17 RX ORDER — ROSUVASTATIN CALCIUM 40 MG/1
40 TABLET, COATED ORAL DAILY
Qty: 60 TABLET | Refills: 2 | Status: SHIPPED | OUTPATIENT
Start: 2025-06-17

## 2025-06-17 NOTE — ASSESSMENT & PLAN NOTE
Orders:    levofloxacin (LEVAQUIN) 500 mg tablet; Take 1 tablet (500 mg total) by mouth every 24 hours for 7 days

## 2025-06-17 NOTE — ASSESSMENT & PLAN NOTE
Orders:    metFORMIN (GLUCOPHAGE) 1000 MG tablet; Take 1 tablet (1,000 mg total) by mouth 2 (two) times a day

## 2025-06-17 NOTE — PROGRESS NOTES
Name: Eagle Hagan      : 1960      MRN: [id number]  Encounter Provider: Imtiaz Vang DO  Encounter Date: 2025   Encounter department: [facility]  :  Assessment & Plan  Non-recurrent acute serous otitis media of right ear    Orders:    levofloxacin (LEVAQUIN) 500 mg tablet; Take 1 tablet (500 mg total) by mouth every 24 hours for 7 days    Type 2 diabetes mellitus without complication, unspecified whether long term insulin use ([facility])    Lab Results   Component Value Date    HGBA1C 8.0 (A) 01/15/2024       Orders:    ezetimibe (ZETIA) 10 mg tablet; Take 1 tablet (10 mg total) by mouth daily    metFORMIN (GLUCOPHAGE) 1000 MG tablet; Take 1 tablet (1,000 mg total) by mouth 2 (two) times a day    rosuvastatin (CRESTOR) 40 MG tablet; Take 1 tablet (40 mg total) by mouth daily    S/P coronary artery stent placement    Orders:    losartan (COZAAR) 25 mg tablet; Take 0.5 tablets (12.5 mg total) by mouth daily    Bronchitis    Orders:    metFORMIN (GLUCOPHAGE) 1000 MG tablet; Take 1 tablet (1,000 mg total) by mouth 2 (two) times a day    NSTEMI (non-ST elevated myocardial infarction) ([facility])    Orders:    metoprolol tartrate (LOPRESSOR) 50 mg tablet; Take 1 tablet (50 mg total) by mouth every 12 (twelve) hours           History of Present Illness   Patient with some glandular swelling as well as some right ear pain over the past 5 days.      Review of Systems   Constitutional: Negative.    HENT:  Positive for ear pain and sore throat.    Eyes: Negative.    Respiratory: Negative.     Cardiovascular: Negative.    Gastrointestinal: Negative.    Endocrine: Negative.    Genitourinary: Negative.    Musculoskeletal: Negative.    Skin: Negative.    Allergic/Immunologic: Negative.    Neurological: Negative.    Hematological: Negative.    Psychiatric/Behavioral: Negative.         Objective   /82 (BP Location: Right arm, Patient Position: Sitting, Cuff Size: Large)   Pulse 98   Temp 98.1  "°F (36.7 °C) (Temporal)   Ht 6' 1\" (1.854 m)   Wt 106 kg (234 lb)   SpO2 96%   BMI 30.87 kg/m²      Physical Exam  Vitals and nursing note reviewed.   Constitutional:       General: He is not in acute distress.     Appearance: Normal appearance. He is well-developed. He is not ill-appearing, toxic-appearing or diaphoretic.   HENT:      Head: Normocephalic and atraumatic.      Right Ear: Ear canal and external ear normal.      Left Ear: Tympanic membrane, ear canal and external ear normal.      Ears:      Comments: Fluid behind right TM     Nose: Nose normal.      Mouth/Throat:      Pharynx: Posterior oropharyngeal erythema present.     Eyes:      General:         Right eye: No discharge.         Left eye: No discharge.     Neck:      Thyroid: No thyromegaly.      Vascular: No carotid bruit.      Trachea: No tracheal deviation.     Cardiovascular:      Rate and Rhythm: Normal rate and regular rhythm.      Heart sounds: Normal heart sounds. No murmur heard.     No gallop.   Pulmonary:      Effort: Pulmonary effort is normal. No respiratory distress.      Breath sounds: Normal breath sounds. No stridor. No wheezing or rales.   Chest:      Chest wall: No tenderness.   Abdominal:      General: Bowel sounds are normal.     Musculoskeletal:         General: No tenderness or deformity. Normal range of motion.      Cervical back: Normal range of motion and neck supple.   Lymphadenopathy:      Cervical: Cervical adenopathy present.     Skin:     General: Skin is warm and dry.      Coloration: Skin is not pale.      Findings: No erythema or rash.     Neurological:      Mental Status: He is alert and oriented to person, place, and time.      Cranial Nerves: No cranial nerve deficit.      Motor: No abnormal muscle tone.      Coordination: Coordination normal.      Deep Tendon Reflexes: Reflexes normal.     Psychiatric:         Behavior: Behavior normal.         Thought Content: Thought content normal.         Judgment: " Judgment normal.

## 2025-06-17 NOTE — ASSESSMENT & PLAN NOTE
Lab Results   Component Value Date    HGBA1C 8.0 (A) 01/15/2024       Orders:    ezetimibe (ZETIA) 10 mg tablet; Take 1 tablet (10 mg total) by mouth daily    metFORMIN (GLUCOPHAGE) 1000 MG tablet; Take 1 tablet (1,000 mg total) by mouth 2 (two) times a day    rosuvastatin (CRESTOR) 40 MG tablet; Take 1 tablet (40 mg total) by mouth daily